# Patient Record
Sex: FEMALE | Race: WHITE | NOT HISPANIC OR LATINO | URBAN - METROPOLITAN AREA
[De-identification: names, ages, dates, MRNs, and addresses within clinical notes are randomized per-mention and may not be internally consistent; named-entity substitution may affect disease eponyms.]

---

## 2018-04-03 ENCOUNTER — EMERGENCY (EMERGENCY)
Facility: HOSPITAL | Age: 63
LOS: 0 days | Discharge: HOME | End: 2018-04-03
Attending: EMERGENCY MEDICINE | Admitting: EMERGENCY MEDICINE
Payer: MEDICAID

## 2018-04-03 ENCOUNTER — INPATIENT (INPATIENT)
Facility: HOSPITAL | Age: 63
LOS: 1 days | Discharge: HOME | End: 2018-04-05
Attending: HOSPITALIST | Admitting: HOSPITALIST
Payer: COMMERCIAL

## 2018-04-03 VITALS
DIASTOLIC BLOOD PRESSURE: 67 MMHG | TEMPERATURE: 98 F | RESPIRATION RATE: 18 BRPM | HEART RATE: 64 BPM | SYSTOLIC BLOOD PRESSURE: 124 MMHG | WEIGHT: 199.96 LBS | OXYGEN SATURATION: 100 % | HEIGHT: 71 IN

## 2018-04-03 VITALS — WEIGHT: 210.1 LBS

## 2018-04-03 VITALS
OXYGEN SATURATION: 99 % | RESPIRATION RATE: 17 BRPM | TEMPERATURE: 98 F | DIASTOLIC BLOOD PRESSURE: 66 MMHG | SYSTOLIC BLOOD PRESSURE: 118 MMHG | HEART RATE: 66 BPM

## 2018-04-03 DIAGNOSIS — R04.0 EPISTAXIS: ICD-10-CM

## 2018-04-03 DIAGNOSIS — Z91.013 ALLERGY TO SEAFOOD: ICD-10-CM

## 2018-04-03 DIAGNOSIS — Z88.5 ALLERGY STATUS TO NARCOTIC AGENT: ICD-10-CM

## 2018-04-03 LAB
ABO RH CONFIRMATION: SIGNIFICANT CHANGE UP
ADD ON TEST-SPECIMEN IN LAB: SIGNIFICANT CHANGE UP
ALBUMIN SERPL ELPH-MCNC: 3.6 G/DL — SIGNIFICANT CHANGE UP (ref 3.3–5)
ALP SERPL-CCNC: 89 U/L — SIGNIFICANT CHANGE UP (ref 40–120)
ALT FLD-CCNC: 28 U/L — SIGNIFICANT CHANGE UP (ref 12–78)
ANION GAP SERPL CALC-SCNC: 6 MMOL/L — SIGNIFICANT CHANGE UP (ref 5–17)
APTT BLD: 32.7 SEC — SIGNIFICANT CHANGE UP (ref 27.5–37.4)
AST SERPL-CCNC: 23 U/L — SIGNIFICANT CHANGE UP (ref 15–37)
BASOPHILS # BLD AUTO: 0.04 K/UL — SIGNIFICANT CHANGE UP (ref 0–0.2)
BASOPHILS NFR BLD AUTO: 0.6 % — SIGNIFICANT CHANGE UP (ref 0–2)
BILIRUB SERPL-MCNC: 0.2 MG/DL — SIGNIFICANT CHANGE UP (ref 0.2–1.2)
BLD GP AB SCN SERPL QL: SIGNIFICANT CHANGE UP
BUN SERPL-MCNC: 28 MG/DL — HIGH (ref 7–23)
CALCIUM SERPL-MCNC: 9.1 MG/DL — SIGNIFICANT CHANGE UP (ref 8.5–10.1)
CHLORIDE SERPL-SCNC: 108 MMOL/L — SIGNIFICANT CHANGE UP (ref 96–108)
CO2 SERPL-SCNC: 25 MMOL/L — SIGNIFICANT CHANGE UP (ref 22–31)
CREAT SERPL-MCNC: 0.88 MG/DL — SIGNIFICANT CHANGE UP (ref 0.5–1.3)
EOSINOPHIL # BLD AUTO: 0.2 K/UL — SIGNIFICANT CHANGE UP (ref 0–0.5)
EOSINOPHIL NFR BLD AUTO: 2.8 % — SIGNIFICANT CHANGE UP (ref 0–6)
GLUCOSE SERPL-MCNC: 109 MG/DL — HIGH (ref 70–99)
HCT VFR BLD CALC: 41.2 % — SIGNIFICANT CHANGE UP (ref 34.5–45)
HGB BLD-MCNC: 13.7 G/DL — SIGNIFICANT CHANGE UP (ref 11.5–15.5)
IMM GRANULOCYTES NFR BLD AUTO: 0.4 % — SIGNIFICANT CHANGE UP (ref 0–1.5)
INR BLD: 1.03 RATIO — SIGNIFICANT CHANGE UP (ref 0.88–1.16)
LYMPHOCYTES # BLD AUTO: 1.91 K/UL — SIGNIFICANT CHANGE UP (ref 1–3.3)
LYMPHOCYTES # BLD AUTO: 26.9 % — SIGNIFICANT CHANGE UP (ref 13–44)
MCHC RBC-ENTMCNC: 31.4 PG — SIGNIFICANT CHANGE UP (ref 27–34)
MCHC RBC-ENTMCNC: 33.3 GM/DL — SIGNIFICANT CHANGE UP (ref 32–36)
MCV RBC AUTO: 94.5 FL — SIGNIFICANT CHANGE UP (ref 80–100)
MONOCYTES # BLD AUTO: 0.56 K/UL — SIGNIFICANT CHANGE UP (ref 0–0.9)
MONOCYTES NFR BLD AUTO: 7.9 % — SIGNIFICANT CHANGE UP (ref 2–14)
NEUTROPHILS # BLD AUTO: 4.37 K/UL — SIGNIFICANT CHANGE UP (ref 1.8–7.4)
NEUTROPHILS NFR BLD AUTO: 61.4 % — SIGNIFICANT CHANGE UP (ref 43–77)
NRBC # BLD: 0 /100 WBCS — SIGNIFICANT CHANGE UP (ref 0–0)
PLATELET # BLD AUTO: 138 K/UL — LOW (ref 150–400)
POTASSIUM SERPL-MCNC: 4.5 MMOL/L — SIGNIFICANT CHANGE UP (ref 3.5–5.3)
POTASSIUM SERPL-SCNC: 4.5 MMOL/L — SIGNIFICANT CHANGE UP (ref 3.5–5.3)
PROT SERPL-MCNC: 7.3 GM/DL — SIGNIFICANT CHANGE UP (ref 6–8.3)
PROTHROM AB SERPL-ACNC: 11.1 SEC — SIGNIFICANT CHANGE UP (ref 9.8–12.7)
RBC # BLD: 4.36 M/UL — SIGNIFICANT CHANGE UP (ref 3.8–5.2)
RBC # FLD: 13.2 % — SIGNIFICANT CHANGE UP (ref 10.3–14.5)
SODIUM SERPL-SCNC: 139 MMOL/L — SIGNIFICANT CHANGE UP (ref 135–145)
TYPE + AB SCN PNL BLD: SIGNIFICANT CHANGE UP
WBC # BLD: 7.11 K/UL — SIGNIFICANT CHANGE UP (ref 3.8–10.5)
WBC # FLD AUTO: 7.11 K/UL — SIGNIFICANT CHANGE UP (ref 3.8–10.5)

## 2018-04-03 PROCEDURE — 30901 CONTROL OF NOSEBLEED: CPT | Mod: 77,78

## 2018-04-03 PROCEDURE — 30901 CONTROL OF NOSEBLEED: CPT | Mod: 50

## 2018-04-03 PROCEDURE — 30901 CONTROL OF NOSEBLEED: CPT

## 2018-04-03 PROCEDURE — 71045 X-RAY EXAM CHEST 1 VIEW: CPT | Mod: 26

## 2018-04-03 PROCEDURE — 93010 ELECTROCARDIOGRAM REPORT: CPT

## 2018-04-03 PROCEDURE — 99284 EMERGENCY DEPT VISIT MOD MDM: CPT | Mod: 25

## 2018-04-03 PROCEDURE — 99285 EMERGENCY DEPT VISIT HI MDM: CPT | Mod: 25

## 2018-04-03 RX ORDER — KETOROLAC TROMETHAMINE 30 MG/ML
15 SYRINGE (ML) INJECTION EVERY 8 HOURS
Qty: 0 | Refills: 0 | Status: DISCONTINUED | OUTPATIENT
Start: 2018-04-03 | End: 2018-04-04

## 2018-04-03 RX ORDER — ACETAMINOPHEN 500 MG
650 TABLET ORAL EVERY 6 HOURS
Qty: 0 | Refills: 0 | Status: DISCONTINUED | OUTPATIENT
Start: 2018-04-03 | End: 2018-04-05

## 2018-04-03 RX ORDER — AMPICILLIN SODIUM AND SULBACTAM SODIUM 250; 125 MG/ML; MG/ML
3 INJECTION, POWDER, FOR SUSPENSION INTRAMUSCULAR; INTRAVENOUS ONCE
Qty: 0 | Refills: 0 | Status: DISCONTINUED | OUTPATIENT
Start: 2018-04-03 | End: 2018-04-03

## 2018-04-03 RX ORDER — SENNA PLUS 8.6 MG/1
2 TABLET ORAL AT BEDTIME
Qty: 0 | Refills: 0 | Status: DISCONTINUED | OUTPATIENT
Start: 2018-04-03 | End: 2018-04-05

## 2018-04-03 RX ORDER — ONDANSETRON 8 MG/1
4 TABLET, FILM COATED ORAL EVERY 6 HOURS
Qty: 0 | Refills: 0 | Status: DISCONTINUED | OUTPATIENT
Start: 2018-04-03 | End: 2018-04-05

## 2018-04-03 RX ORDER — ACETAMINOPHEN 500 MG
1000 TABLET ORAL ONCE
Qty: 0 | Refills: 0 | Status: COMPLETED | OUTPATIENT
Start: 2018-04-03 | End: 2018-04-03

## 2018-04-03 RX ORDER — AMPICILLIN SODIUM AND SULBACTAM SODIUM 250; 125 MG/ML; MG/ML
3 INJECTION, POWDER, FOR SUSPENSION INTRAMUSCULAR; INTRAVENOUS ONCE
Qty: 0 | Refills: 0 | Status: COMPLETED | OUTPATIENT
Start: 2018-04-03 | End: 2018-04-03

## 2018-04-03 RX ADMIN — Medication 1000 MILLIGRAM(S): at 14:30

## 2018-04-03 RX ADMIN — Medication 15 MILLIGRAM(S): at 18:43

## 2018-04-03 RX ADMIN — AMPICILLIN SODIUM AND SULBACTAM SODIUM 200 GRAM(S): 250; 125 INJECTION, POWDER, FOR SUSPENSION INTRAMUSCULAR; INTRAVENOUS at 11:20

## 2018-04-03 RX ADMIN — Medication 15 MILLIGRAM(S): at 17:37

## 2018-04-03 RX ADMIN — Medication 1 TABLET(S): at 21:27

## 2018-04-03 RX ADMIN — Medication 650 MILLIGRAM(S): at 21:27

## 2018-04-03 NOTE — ED PROVIDER NOTE - MEDICAL DECISION MAKING DETAILS
pt presenting with persistent epistaxis. no trauma, AC, med history. will check cbc, coags. will attempt pressure and afrin but will likely need rhino rocket

## 2018-04-03 NOTE — ED ADULT TRIAGE NOTE - CHIEF COMPLAINT QUOTE
pt c/o nose bleed that started less than 30 minutes ago, pt was seen in ED for same complaint and discharged 30 minutes ago, pt denies fall or trauma. pt denies dizziness weakness SOB

## 2018-04-03 NOTE — ED STATDOCS - OBJECTIVE STATEMENT
64 y/o female with no pertinent PMHx presents to the ED for evaluation of B/L epistaxis. Pt was seen at  this morning for same complaint and was discharged after B/L interior nasal packing. Pt now describes feeling the blood coming down the back of her throat. No blood thinners. Denies similar nosebleeds in the past. Denies SOB.

## 2018-04-03 NOTE — ED STATDOCS - ENMT, MLM
+B/L rhino rockets in place, soaked in blood, no active bleeding or oozing of blood around the packing. Nasal mucosa clear. Mouth with normal mucosa  Throat has no vesicles, no oropharyngeal exudates and uvula is midline.

## 2018-04-03 NOTE — H&P ADULT - ASSESSMENT
63 year old woman with recurrent epistaxis despite nasal packing.      Epistaxis:    -s/p nasal packing with rhinorocket 7.5  -monitor on tele  -chest xray normal  -CBC, INR, chem unremarkable   -Augmentin for prophylaxis   -f/u ENT    DVT ppx:  -ambulation

## 2018-04-03 NOTE — ED ADULT NURSE NOTE - OBJECTIVE STATEMENT
pt c/o nose bleed that started last evening. pt applied pressure and stopped, however started again early this morning and has been ongoing for several hours. pt not on any medications, no blood thinners. denies trauma. no hx of prior nosebleeds

## 2018-04-03 NOTE — H&P ADULT - NSHPPHYSICALEXAM_GEN_ALL_CORE
Vital Signs Last 24 Hrs  T(C): 36.1 (03 Apr 2018 15:17), Max: 36.9 (03 Apr 2018 09:50)  T(F): 97 (03 Apr 2018 15:17), Max: 98.4 (03 Apr 2018 09:50)  HR: 75 (03 Apr 2018 15:17) (64 - 75)  BP: 138/70 (03 Apr 2018 15:17) (118/66 - 138/70)  BP(mean): --  RR: 18 (03 Apr 2018 15:17) (17 - 18)  SpO2: 100% (03 Apr 2018 15:17) (99% - 100%)    PHYSICAL EXAM:    Constitutional: NAD, awake and alert, well-developed  HEENT: PERR, EOMI, Normal Hearing, MMM, rhino rocket in right nare, no bleeding.   Neck: Soft and supple  Respiratory: Breath sounds are clear bilaterally, No wheezing, rales or rhonchi  Cardiovascular: S1 and S2, regular rate and rhythm, no Murmurs, gallops or rubs  Gastrointestinal: Bowel Sounds present, soft, nontender, nondistended, no guarding, no rebound  Extremities: No peripheral edema  Neurological: A/O x 3, no focal deficits in my limited exam  Skin: No rashes

## 2018-04-03 NOTE — ED PROVIDER NOTE - CARE PLAN
PHYSICAL EXAM:  GENERAL: NAD, well-groomed, well-developed  HEAD:  Atraumatic, Normocephalic  EYES: EOMI, PERRLA, conjunctiva and sclera clear  ENMT: Moist mucous membranes  NECK: Supple, No JVD  CHEST/LUNG: Clear to percussion bilaterally; No rales, rhonchi, wheezing, or rubs  HEART: Regular rate and rhythm; No murmurs, rubs, or gallops  ABDOMEN: Soft, Nontender, Nondistended; Bowel sounds present  EXTREMITIES:  2+ Peripheral Pulses, No clubbing, cyanosis, or edema.   LYMPH: No lymphadenopathy noted  SKIN: Erythema along the right thigh and right ankle, patchy in distribution   NERVOUS SYSTEM:  Alert & Oriented X3, Good concentration; Motor Strength 5/5 B/L upper and lower extremities
Principal Discharge DX:	Epistaxis

## 2018-04-03 NOTE — ED STATDOCS - MEDICAL DECISION MAKING DETAILS
Discuss with ENT, likely remove packing and place posterior packing. Discuss with ENT, likely remove packing and place posterior packing.  Pt with a likely posterior bleed as she improved with placement of posterior R packing and able to remove L packing w/o further issues. Will need admission and monitoring for bradydysrhythmias while posterior packing in place

## 2018-04-03 NOTE — ED PROVIDER NOTE - ENMT, MLM
Airway patent, right sided steady epistaxis. unable to visualize source of bleeding. Mouth with normal mucosa. Throat has no vesicles, no oropharyngeal exudates and uvula is midline.

## 2018-04-03 NOTE — ED STATDOCS - ATTENDING CONTRIBUTION TO CARE
I, Miles White MD,  performed the initial face to face bedside interview with this patient regarding history of present illness, review of symptoms and relevant past medical, social and family history.  I completed an independent physical examination.  I was the initial provider who evaluated this patient. I have signed out the follow up of any pending tests (i.e. labs, radiological studies) to the ACP.  I have communicated the patient’s plan of care and disposition with the ACP.  The history, relevant review of systems, past medical and surgical history, medical decision making, and physical examination was documented by the scribe in my presence and I attest to the accuracy of the documentation.  I, Miles White MD, personally saw the patient with ACP.  I have personally performed a face to face diagnostic evaluation on this patient.  I have reviewed the ACP note and agree with the history, exam, and plan of care, except as noted.

## 2018-04-03 NOTE — H&P ADULT - NSHPLABSRESULTS_GEN_ALL_CORE
13.7   7.11  )-----------( 138      ( 03 Apr 2018 06:34 )             41.2     04-03    139  |  108  |  28<H>  ----------------------------<  109<H>  4.5   |  25  |  0.88    Ca    9.1      03 Apr 2018 06:34    TPro  7.3  /  Alb  3.6  /  TBili  0.2  /  DBili  x   /  AST  23  /  ALT  28  /  AlkPhos  89  04-03    CAPILLARY BLOOD GLUCOSE        LIVER FUNCTIONS - ( 03 Apr 2018 06:34 )  Alb: 3.6 g/dL / Pro: 7.3 gm/dL / ALK PHOS: 89 U/L / ALT: 28 U/L / AST: 23 U/L / GGT: x           PT/INR - ( 03 Apr 2018 06:34 )   PT: 11.1 sec;   INR: 1.03 ratio         PTT - ( 03 Apr 2018 06:34 )  PTT:32.7 sec      < from: Xray Chest 1 View- PORTABLE-Urgent (04.03.18 @ 11:26) >    Impression: No active pulmonary disease.    < end of copied text >

## 2018-04-03 NOTE — ED ADULT NURSE NOTE - OBJECTIVE STATEMENT
Patient presents with bilateral nosebleed. Patient was seen in ER earlier this morning and received nasal packing and was discharged. Patient reports she feels bleeding down back of throat. Denies blood thinners. Denies history of nosebleeds in past

## 2018-04-03 NOTE — H&P ADULT - HISTORY OF PRESENT ILLNESS
63 year old woman with no PMHx presents with epistaxis which started Monday morning at 6:15AM.  Pt states this woke her up.  They applied compression with resolution however that night the bleeding restarted.  Pt came to ER and a rhino rocket was applied b/l.  She was discharged however she can still feel blood going down her throat so she came back.   Denies taking any meds.     In ER: Rhinorocket exchanged for Rapid Rhino #7.5 on right.  She has mild discomfort 4 out of 10.  She was given tylenol.  She will be admitted for 2 day monitoring.

## 2018-04-03 NOTE — ED STATDOCS - PROGRESS NOTE DETAILS
63 yr. old female PMH: Denied presents to ED with 63 yr. old female PMH: Denied presents to ED with bilateral epistaxsis onset in right nostril yesterday morning seen in ED this am and bilateral anterior nasal Rapid Rhino packing inserted. At home began to bleed in back of throat after being discharged this am. No fever or chills. Not on any blood thinners. No PMH of nose bleeds. Seen and examined by attending in Intake. Plan: Dr. Fields for consult and remove left nasal packing and replace with 7.5 posterior nasal packing. Will F/U with patient and results. Marleen NP Anterior nasal packing removed by Dr. White blew a large clot from nostril inserted 7.5 posterior nasal Rapid Rhino packing. Tolerated well by patient. Marleen NP

## 2018-04-03 NOTE — ED PROVIDER NOTE - PROGRESS NOTE DETAILS
AJM: pt improved after b/l nasal packing. no posterior bleeding noted. spoke with dr Fields of ent who will see pt in clinic tomorrow. labs normal. All results discussed with the patient, and a copy of results has been provided. Patient is comfortable with dc plan for home. Opportunity for questions was provided and all questions have been addressed.

## 2018-04-03 NOTE — ED PROVIDER NOTE - OBJECTIVE STATEMENT
Patient is a 63 year old female with no PMH presents with intermittent epistaxis. Symptoms began yesterday. no trauma. Bleeding has now been present for several hours. No blood thinners, vomiting. Bleeding is mainly from right nostril. Pt has tried compression with ice without improvement.

## 2018-04-04 LAB
ANION GAP SERPL CALC-SCNC: 7 MMOL/L — SIGNIFICANT CHANGE UP (ref 5–17)
BUN SERPL-MCNC: 42 MG/DL — HIGH (ref 7–23)
CALCIUM SERPL-MCNC: 8.9 MG/DL — SIGNIFICANT CHANGE UP (ref 8.5–10.1)
CHLORIDE SERPL-SCNC: 108 MMOL/L — SIGNIFICANT CHANGE UP (ref 96–108)
CO2 SERPL-SCNC: 25 MMOL/L — SIGNIFICANT CHANGE UP (ref 22–31)
CREAT SERPL-MCNC: 0.98 MG/DL — SIGNIFICANT CHANGE UP (ref 0.5–1.3)
GLUCOSE SERPL-MCNC: 156 MG/DL — HIGH (ref 70–99)
HCT VFR BLD CALC: 33 % — LOW (ref 34.5–45)
HCT VFR BLD CALC: 36.6 % — SIGNIFICANT CHANGE UP (ref 34.5–45)
HGB BLD-MCNC: 10.9 G/DL — LOW (ref 11.5–15.5)
HGB BLD-MCNC: 12.5 G/DL — SIGNIFICANT CHANGE UP (ref 11.5–15.5)
MCHC RBC-ENTMCNC: 31.5 PG — SIGNIFICANT CHANGE UP (ref 27–34)
MCHC RBC-ENTMCNC: 31.6 PG — SIGNIFICANT CHANGE UP (ref 27–34)
MCHC RBC-ENTMCNC: 33 GM/DL — SIGNIFICANT CHANGE UP (ref 32–36)
MCHC RBC-ENTMCNC: 34.2 GM/DL — SIGNIFICANT CHANGE UP (ref 32–36)
MCV RBC AUTO: 92.4 FL — SIGNIFICANT CHANGE UP (ref 80–100)
MCV RBC AUTO: 95.4 FL — SIGNIFICANT CHANGE UP (ref 80–100)
NRBC # BLD: 0 /100 WBCS — SIGNIFICANT CHANGE UP (ref 0–0)
NRBC # BLD: 0 /100 WBCS — SIGNIFICANT CHANGE UP (ref 0–0)
PLATELET # BLD AUTO: 131 K/UL — LOW (ref 150–400)
PLATELET # BLD AUTO: 146 K/UL — LOW (ref 150–400)
POTASSIUM SERPL-MCNC: 4.2 MMOL/L — SIGNIFICANT CHANGE UP (ref 3.5–5.3)
POTASSIUM SERPL-SCNC: 4.2 MMOL/L — SIGNIFICANT CHANGE UP (ref 3.5–5.3)
RBC # BLD: 3.46 M/UL — LOW (ref 3.8–5.2)
RBC # BLD: 3.96 M/UL — SIGNIFICANT CHANGE UP (ref 3.8–5.2)
RBC # FLD: 13.2 % — SIGNIFICANT CHANGE UP (ref 10.3–14.5)
RBC # FLD: 13.5 % — SIGNIFICANT CHANGE UP (ref 10.3–14.5)
SODIUM SERPL-SCNC: 140 MMOL/L — SIGNIFICANT CHANGE UP (ref 135–145)
WBC # BLD: 10.5 K/UL — SIGNIFICANT CHANGE UP (ref 3.8–10.5)
WBC # BLD: 8.92 K/UL — SIGNIFICANT CHANGE UP (ref 3.8–10.5)
WBC # FLD AUTO: 10.5 K/UL — SIGNIFICANT CHANGE UP (ref 3.8–10.5)
WBC # FLD AUTO: 8.92 K/UL — SIGNIFICANT CHANGE UP (ref 3.8–10.5)

## 2018-04-04 RX ORDER — LANOLIN ALCOHOL/MO/W.PET/CERES
3 CREAM (GRAM) TOPICAL AT BEDTIME
Qty: 0 | Refills: 0 | Status: DISCONTINUED | OUTPATIENT
Start: 2018-04-04 | End: 2018-04-05

## 2018-04-04 RX ORDER — ACETAMINOPHEN 500 MG
1000 TABLET ORAL ONCE
Qty: 0 | Refills: 0 | Status: COMPLETED | OUTPATIENT
Start: 2018-04-04 | End: 2018-04-04

## 2018-04-04 RX ORDER — KETOROLAC TROMETHAMINE 30 MG/ML
10 SYRINGE (ML) INJECTION ONCE
Qty: 0 | Refills: 0 | Status: DISCONTINUED | OUTPATIENT
Start: 2018-04-04 | End: 2018-04-04

## 2018-04-04 RX ORDER — KETOROLAC TROMETHAMINE 30 MG/ML
15 SYRINGE (ML) INJECTION ONCE
Qty: 0 | Refills: 0 | Status: DISCONTINUED | OUTPATIENT
Start: 2018-04-04 | End: 2018-04-05

## 2018-04-04 RX ORDER — OXYMETAZOLINE HYDROCHLORIDE 0.5 MG/ML
2 SPRAY NASAL ONCE
Qty: 0 | Refills: 0 | Status: DISCONTINUED | OUTPATIENT
Start: 2018-04-04 | End: 2018-04-05

## 2018-04-04 RX ORDER — DIPHENHYDRAMINE HCL 50 MG
25 CAPSULE ORAL ONCE
Qty: 0 | Refills: 0 | Status: DISCONTINUED | OUTPATIENT
Start: 2018-04-04 | End: 2018-04-05

## 2018-04-04 RX ADMIN — Medication 1 TABLET(S): at 05:10

## 2018-04-04 RX ADMIN — Medication 400 MILLIGRAM(S): at 11:37

## 2018-04-04 RX ADMIN — Medication 650 MILLIGRAM(S): at 20:30

## 2018-04-04 RX ADMIN — Medication 1 TABLET(S): at 17:29

## 2018-04-04 RX ADMIN — Medication 3 MILLIGRAM(S): at 20:50

## 2018-04-04 RX ADMIN — Medication 650 MILLIGRAM(S): at 06:08

## 2018-04-04 RX ADMIN — Medication 15 MILLIGRAM(S): at 01:48

## 2018-04-04 RX ADMIN — Medication 15 MILLIGRAM(S): at 02:08

## 2018-04-04 RX ADMIN — Medication 650 MILLIGRAM(S): at 05:10

## 2018-04-04 RX ADMIN — Medication 650 MILLIGRAM(S): at 01:47

## 2018-04-04 RX ADMIN — Medication 1000 MILLIGRAM(S): at 12:10

## 2018-04-04 NOTE — PROGRESS NOTE ADULT - ASSESSMENT
Epistaxis currently controlled with functional 7.5 cm right sided rapid rhino pack.  1) If no further episodes overnight pt may be discharged to the office for packing removal on Friday in the office. Card given to pt.  2) If bleeding resumes from the right nostril, insert surgicel around current packing (the pts nurse has it available)

## 2018-04-04 NOTE — CONSULT NOTE ADULT - SUBJECTIVE AND OBJECTIVE BOX
63 year old woman with no PMHx presents with epistaxis which started Monday morning at 6:15AM.  Pt states this woke her up.  They applied compression with resolution however that night the bleeding restarted.  Pt came to ER and a rhino rocket was applied b/l.  She was discharged and returned with a bleeding down her throat. In the ER the Rhino rocket was exchanged for a7.5cm Rapid Rhino on the right.  Bleeding was controlled but the balloon deflated last night and was reinserted by the ICU staff. No bleeding now     Review of Systems:  Review of Systems: REVIEW OF SYSTEMS: All other review of systems is negative unless indicated above.	      Allergies and Intolerances:        Allergies:  	mineral oil: Drug, Rash  	codeine: Drug, Unknown, Originally Entered as [Unknown] reaction to [Codeine]  	shellfish: Food, Unknown, Originally Entered as [Unknown] reaction to [shellfish derived]    Home Medications:   * Patient Currently Takes Medications as of 03-Apr-2018 08:43 documented in Structured Notes  · 	Augmentin 875 mg-125 mg oral tablet: 1 tab(s) orally every 12 hours     .    Patient History:    Past Medical History:  No pertinent past medical history.     Past Surgical History:  No significant past surgical history.     Family History:  No pertinent family history in first degree relatives.     Social History:  Social History (marital status, living situation, occupation, tobacco use, alcohol and drug use, and sexual history): Lives with .  No kids. Denies alcohol or tobacco use.	    Exam:  Rapid rhino in right nasal cavity with no active bleed.  Oral cavity dry  no neck mass

## 2018-04-04 NOTE — PROGRESS NOTE ADULT - ASSESSMENT
63 year old woman with recurrent epistaxis despite nasal packing.    EPITAXIS  -s/p nasal packing with rhinorocket 7.5  w/recurrent bleed and device deflation  ** bleed decrease with re-inflation with 2cc by RN  - recall ENT to re-assess given frequency of deflation  - dc toradol d/t ongoing bleeding  - order IV tylenol for pain (aware inability to treat inflammation)  -chest xray normal  -CBC, INR, chem unremarkable   - con't Augmentin for prophylaxis   -f/u ENT    DVT ppx:  - low risk for DVT  - ambulation 63 year old woman with recurrent epistaxis despite nasal packing.    EPITAXIS with acute blood loss anemia:   -s/p nasal packing with rhinorocket 7.5  w/recurrent bleed and device deflation  ** bleed decrease with re-inflation with 2cc by RN  - recall ENT to re-assess given frequency of deflation  - dc toradol d/t ongoing bleeding  - order IV tylenol for pain (aware inability to treat inflammation)  -chest xray normal  -INR, chem unremarkable   - con't Augmentin for prophylaxis   -f/u ENT  -f/u repeat cbc    Thrombocytopenia:  -monitor    DVT ppx:  - low risk for DVT  - ambulation    Attending Statement:  40 minutes spent on total encounter; more than 50% of the visit was spent counseling and/or coordinating care by the attending physician.  Patient seen and examined with NP Bushra.  Agree with physical exam and assessment and plan.

## 2018-04-04 NOTE — CHART NOTE - NSCHARTNOTEFT_GEN_A_CORE
Called by nurse @ ~2:40 AM for epistaxis. Pt admitted for epistaxis, had initially b/l nasal balloons, now L one removed and R one still in place with packing. Pt is currently actively bleeding from R     As per Dr. Fields, requested we remove balloon from R side and replace it with new rhino rocket. While he was giving his recommendations, nurses called rapid response for risk of airway compromise. Stem had detached from balloon with balloon deflated and stuck in patient's nose. Pt tried blowing out her nose to get balloon out but only blood came out. Dr. Dial Called by nurse @ ~2:40 AM for epistaxis. Pt admitted for epistaxis, had initially b/l nasal balloons, now L one removed and R one still in place with packing. Pt is currently actively bleeding from R     As per Dr. Fields, requested we remove balloon from R side and replace it with new rhino rocket. While he was giving his recommendations, nurses called rapid response for risk of airway compromise. Stem had detached from balloon with balloon deflated and stuck in patient's nose. Pt tried blowing out her nose to get balloon out but only blood came out. Dr. Dial was able to extract old deflated balloon at bedside and replace with new rhino rocket. The patient was vitally stable the whole time and saturating % on room air.    f/u stat CBC  Will continue to monitor    Discussed w/ intensivist and senior resident

## 2018-04-04 NOTE — CONSULT NOTE ADULT - ASSESSMENT
Epistaxis controlled with right sided packing  1) Leave packing in place 3 days  2) ABX while packing in place to prevent sinusitis  3) Outpt follow up for packing removal

## 2018-04-05 ENCOUNTER — TRANSCRIPTION ENCOUNTER (OUTPATIENT)
Age: 63
End: 2018-04-05

## 2018-04-05 VITALS
TEMPERATURE: 99 F | OXYGEN SATURATION: 100 % | DIASTOLIC BLOOD PRESSURE: 60 MMHG | SYSTOLIC BLOOD PRESSURE: 140 MMHG | HEART RATE: 72 BPM

## 2018-04-05 LAB
ANION GAP SERPL CALC-SCNC: 8 MMOL/L — SIGNIFICANT CHANGE UP (ref 5–17)
BUN SERPL-MCNC: 34 MG/DL — HIGH (ref 7–23)
CALCIUM SERPL-MCNC: 8.8 MG/DL — SIGNIFICANT CHANGE UP (ref 8.5–10.1)
CHLORIDE SERPL-SCNC: 106 MMOL/L — SIGNIFICANT CHANGE UP (ref 96–108)
CO2 SERPL-SCNC: 25 MMOL/L — SIGNIFICANT CHANGE UP (ref 22–31)
CREAT SERPL-MCNC: 0.99 MG/DL — SIGNIFICANT CHANGE UP (ref 0.5–1.3)
GLUCOSE SERPL-MCNC: 128 MG/DL — HIGH (ref 70–99)
HCT VFR BLD CALC: 30.7 % — LOW (ref 34.5–45)
HGB BLD-MCNC: 10.2 G/DL — LOW (ref 11.5–15.5)
MCHC RBC-ENTMCNC: 31.4 PG — SIGNIFICANT CHANGE UP (ref 27–34)
MCHC RBC-ENTMCNC: 33.2 GM/DL — SIGNIFICANT CHANGE UP (ref 32–36)
MCV RBC AUTO: 94.5 FL — SIGNIFICANT CHANGE UP (ref 80–100)
NRBC # BLD: 0 /100 WBCS — SIGNIFICANT CHANGE UP (ref 0–0)
PLATELET # BLD AUTO: 120 K/UL — LOW (ref 150–400)
POTASSIUM SERPL-MCNC: 4.2 MMOL/L — SIGNIFICANT CHANGE UP (ref 3.5–5.3)
POTASSIUM SERPL-SCNC: 4.2 MMOL/L — SIGNIFICANT CHANGE UP (ref 3.5–5.3)
RBC # BLD: 3.25 M/UL — LOW (ref 3.8–5.2)
RBC # FLD: 13.7 % — SIGNIFICANT CHANGE UP (ref 10.3–14.5)
SODIUM SERPL-SCNC: 139 MMOL/L — SIGNIFICANT CHANGE UP (ref 135–145)
WBC # BLD: 9.89 K/UL — SIGNIFICANT CHANGE UP (ref 3.8–10.5)
WBC # FLD AUTO: 9.89 K/UL — SIGNIFICANT CHANGE UP (ref 3.8–10.5)

## 2018-04-05 RX ORDER — ACETAMINOPHEN 500 MG
2 TABLET ORAL
Qty: 0 | Refills: 0 | DISCHARGE
Start: 2018-04-05

## 2018-04-05 RX ORDER — KETOROLAC TROMETHAMINE 30 MG/ML
15 SYRINGE (ML) INJECTION ONCE
Qty: 0 | Refills: 0 | Status: DISCONTINUED | OUTPATIENT
Start: 2018-04-05 | End: 2018-04-05

## 2018-04-05 RX ORDER — OXYCODONE HYDROCHLORIDE 5 MG/1
5 TABLET ORAL EVERY 6 HOURS
Qty: 0 | Refills: 0 | Status: DISCONTINUED | OUTPATIENT
Start: 2018-04-05 | End: 2018-04-05

## 2018-04-05 RX ORDER — ACETAMINOPHEN 500 MG
1000 TABLET ORAL ONCE
Qty: 0 | Refills: 0 | Status: COMPLETED | OUTPATIENT
Start: 2018-04-05 | End: 2018-04-05

## 2018-04-05 RX ADMIN — OXYCODONE HYDROCHLORIDE 5 MILLIGRAM(S): 5 TABLET ORAL at 11:39

## 2018-04-05 RX ADMIN — Medication 15 MILLIGRAM(S): at 05:21

## 2018-04-05 RX ADMIN — Medication 650 MILLIGRAM(S): at 04:19

## 2018-04-05 RX ADMIN — Medication 1 TABLET(S): at 05:14

## 2018-04-05 RX ADMIN — Medication 15 MILLIGRAM(S): at 05:22

## 2018-04-05 RX ADMIN — Medication 1 TABLET(S): at 17:18

## 2018-04-05 RX ADMIN — Medication 400 MILLIGRAM(S): at 14:21

## 2018-04-05 RX ADMIN — Medication 1000 MILLIGRAM(S): at 15:20

## 2018-04-05 RX ADMIN — OXYCODONE HYDROCHLORIDE 5 MILLIGRAM(S): 5 TABLET ORAL at 12:33

## 2018-04-05 NOTE — DISCHARGE NOTE ADULT - HOSPITAL COURSE
· Subjective and Objective: 	  Chart and meds reviewed.  Patient seen and examined.    CC:    HPI: 63 year old woman with no PMHx presents with epistaxis which started Monday morning at 6:15AM.  Pt states this woke her up.  They applied compression with resolution however that night the bleeding restarted.  Pt came to ER and a rhino rocket was applied b/l.  She was discharged however she can still feel blood going down her throat so she came back.   Denies taking any meds.     In ER: Rhinorocket exchanged for Rapid Rhino #7.5 on right.  She has mild discomfort 4 out of 10.  She was given tylenol.  She will be admitted for 2 day monitoring.   HOSPITAL COURSE  4/4/18 - Annoyed that bleeding returns and device is deflated "again", has general headache; no dizziness/CP/palpitations/SOB; n/v/f/c  4/5: Pt anxious and uncomfortable but otherwise has packing in place.  Pressure had to be adjusted several times.  She is to be discharged home with outpatient ENT follow up tomorrow.        Assessment and Plan:  63 year old woman with recurrent epistaxis.    EPITAXIS with acute blood loss anemia:   -s/p nasal packing with rhinorocket 7.5  - monitor for bleeding.   -recall ENT to re-assess given frequency of deflation  -dc toradol d/t ongoing bleeding  -chest xray normal  -INR, chem unremarkable    con't Augmentin for prophylaxis   -f/u ENT tomorrow as outpatient.

## 2018-04-05 NOTE — DISCHARGE NOTE ADULT - CARE PLAN
Principal Discharge DX:	Epistaxis, recurrent  Goal:	resolution of symptoms.  Assessment and plan of treatment:	Follow up with ENT tomorrow.  keep packing in place.

## 2018-04-05 NOTE — DISCHARGE NOTE ADULT - PATIENT PORTAL LINK FT
You can access the [facility], offered by [facility], by registering with the following website: [email or web address]

## 2018-04-05 NOTE — DISCHARGE NOTE ADULT - CARE PROVIDER_API CALL
Lenka Fields (DO), Otolaryngology  [address] [address]  Phone: [phone]  Fax: [phone]

## 2018-04-05 NOTE — PROGRESS NOTE ADULT - SUBJECTIVE AND OBJECTIVE BOX
Chart and meds reviewed.  Patient seen and examined.    CC:    HPI: 63 year old woman with no PMHx presents with epistaxis which started Monday morning at 6:15AM.  Pt states this woke her up.  They applied compression with resolution however that night the bleeding restarted.  Pt came to ER and a rhino rocket was applied b/l.  She was discharged however she can still feel blood going down her throat so she came back.   Denies taking any meds.     In ER: Rhinorocket exchanged for Rapid Rhino #7.5 on right.  She has mild discomfort 4 out of 10.  She was given tylenol.  She will be admitted for 2 day monitoring.      All systems reviewed and found to be negative with the exception of what has been described above.    HOSPITAL COURSE  4/4/18 - Annoyed that bleeding returns and device is deflated "again", has general headache; no dizziness/CP/palpitations/SOB; n/v/f/c      MEDICATIONS  (STANDING):  amoxicillin  875 milliGRAM(s)/clavulanate 1 Tablet(s) Oral two times a day    MEDICATIONS  (PRN):  acetaminophen   Tablet. 650 milliGRAM(s) Oral every 6 hours PRN Moderate Pain (4 - 6)  ondansetron Injectable 4 milliGRAM(s) IV Push every 6 hours PRN Nausea  oxymetazoline 0.05% Nasal Spray 2 Spray(s) Left Nostril once PRN Congestion  senna 2 Tablet(s) Oral at bedtime PRN Constipation      VITALS:  Vital Signs Last 24 Hrs  T(C): 36.6 (04 Apr 2018 10:15), Max: 36.9 (03 Apr 2018 18:21)  T(F): 97.8 (04 Apr 2018 10:15), Max: 98.5 (03 Apr 2018 18:21)  HR: 84 (04 Apr 2018 10:15) (72 - 84)  BP: 109/49 (04 Apr 2018 10:15) (109/49 - 147/70)  BP(mean): --  RR: 18 (04 Apr 2018 10:15) (18 - 20)  SpO2: 95% (04 Apr 2018 10:15) (95% - 100%)        PHYSICAL EXAM:    HEENT: AT/NC; pupils equal round nonicteric, active nasal bleeding, rhino tube ~deflated  NECK:   supple, no carotid bruits, no palpable lymph nodes, no thyromegaly  CV:  +S1, +S2, regular, no murmurs or rubs  RESP:   lungs clear to auscultation bilaterally, no wheezing, rales, rhonchi, good air entry bilaterally  GI:  abdomen soft, non-tender, non-distended, normal BS, no bruits, no abdominal masses, no palpable masses  MSK:   normal muscle tone, no atrophy, no rigidity, no contractions  EXT:   no clubbing, no cyanosis, no edema, no calf pain, swelling or erythema  VASCULAR:  pulses equal and symmetric in the upper and lower extremities  NEURO:  AAOX3, no focal neurological deficits, follows all commands, able to move extremities spontaneously  SKIN:  no ulcers, lesions or rashes      LABS                        10.9   8.92  )-----------( 131      ( 04 Apr 2018 09:37 )             33.0     04-04    140  |  108  |  42<H>  ----------------------------<  156<H>  4.2   |  25  |  0.98    Ca    8.9      04 Apr 2018 09:37    TPro  7.3  /  Alb  3.6  /  TBili  0.2  /  DBili  x   /  AST  23  /  ALT  28  /  AlkPhos  89  04-03      LIVER FUNCTIONS - ( 03 Apr 2018 06:34 )  Alb: 3.6 g/dL / Pro: 7.3 gm/dL / ALK PHOS: 89 U/L / ALT: 28 U/L / AST: 23 U/L / GGT: x           PT/INR - ( 03 Apr 2018 06:34 )   PT: 11.1 sec;   INR: 1.03 ratio       PTT - ( 03 Apr 2018 06:34 )  PTT:32.7 sec    RADIOLOGY    	< from: Xray Chest 1 View- PORTABLE-Urgent (04.03.18 @ 11:26) >    	Impression: No active pulmonary disease.    	< end of copied text >
Chart and meds reviewed.  Patient seen and examined.    CC:    HPI: 63 year old woman with no PMHx presents with epistaxis which started Monday morning at 6:15AM.  Pt states this woke her up.  They applied compression with resolution however that night the bleeding restarted.  Pt came to ER and a rhino rocket was applied b/l.  She was discharged however she can still feel blood going down her throat so she came back.   Denies taking any meds.     In ER: Rhinorocket exchanged for Rapid Rhino #7.5 on right.  She has mild discomfort 4 out of 10.  She was given tylenol.  She will be admitted for 2 day monitoring.      All systems reviewed and found to be negative with the exception of what has been described above.    HOSPITAL COURSE  4/4/18 - Annoyed that bleeding returns and device is deflated "again", has general headache; no dizziness/CP/palpitations/SOB; n/v/f/c  4/5: Pt anxious and uncomfortable but otherwise has packing in place.  Pressure had to be adjusted several times by nursing staff.  Pt remains HD stable.    MEDICATIONS      MEDICATIONS  (STANDING):  amoxicillin  875 milliGRAM(s)/clavulanate 1 Tablet(s) Oral two times a day    MEDICATIONS  (PRN):  acetaminophen   Tablet. 650 milliGRAM(s) Oral every 6 hours PRN Moderate Pain (4 - 6)  diphenhydrAMINE   Capsule 25 milliGRAM(s) Oral once PRN Insomnia  melatonin 3 milliGRAM(s) Oral at bedtime PRN Insomnia  ondansetron Injectable 4 milliGRAM(s) IV Push every 6 hours PRN Nausea  oxyCODONE    IR 5 milliGRAM(s) Oral every 6 hours PRN Moderate Pain (4 - 6)  oxymetazoline 0.05% Nasal Spray 2 Spray(s) Left Nostril once PRN Congestion  senna 2 Tablet(s) Oral at bedtime PRN Constipation    Vital Signs Last 24 Hrs  T(C): 37.3 (05 Apr 2018 10:55), Max: 37.3 (05 Apr 2018 10:55)  T(F): 99.1 (05 Apr 2018 10:55), Max: 99.1 (05 Apr 2018 10:55)  HR: 81 (05 Apr 2018 10:55) (73 - 82)  BP: 130/56 (05 Apr 2018 10:55) (124/62 - 130/56)  BP(mean): --  RR: 18 (05 Apr 2018 10:55) (17 - 18)  SpO2: 99% (05 Apr 2018 10:55) (99% - 100%)    PHYSICAL EXAM:    HEENT: AT/NC; pupils equal round nonicteric, active nasal bleeding, rhino tube ~deflated  NECK:   supple, no carotid bruits, no palpable lymph nodes, no thyromegaly  CV:  +S1, +S2, regular, no murmurs or rubs  RESP:   lungs clear to auscultation bilaterally, no wheezing, rales, rhonchi, good air entry bilaterally  GI:  abdomen soft, non-tender, non-distended, normal BS, no bruits, no abdominal masses, no palpable masses  MSK:   normal muscle tone, no atrophy, no rigidity, no contractions  EXT:   no clubbing, no cyanosis, no edema, no calf pain, swelling or erythema  VASCULAR:  pulses equal and symmetric in the upper and lower extremities  NEURO:  AAOX3, no focal neurological deficits, follows all commands, able to move extremities spontaneously  SKIN:  no ulcers, lesions or rashes      LABS                                              10.2   9.89  )-----------( 120      ( 05 Apr 2018 06:16 )             30.7     04-05    139  |  106  |  34<H>  ----------------------------<  128<H>  4.2   |  25  |  0.99    Ca    8.8      05 Apr 2018 06:16      CAPILLARY BLOOD GLUCOSE      Assessment and Plan:  63 year old woman with recurrent epistaxis.    EPITAXIS with acute blood loss anemia:   -s/p nasal packing with rhinorocket 7.5  - monitor for bleeding.   -recall ENT to re-assess given frequency of deflation  -dc toradol d/t ongoing bleeding  -pain management with oxycodone.   -chest xray normal  -INR, chem unremarkable    con't Augmentin for prophylaxis   -f/u ENT    Thrombocytopenia:  -monitor    DVT ppx:  - low risk for DVT  - ambulation    Dispo:  -discharge home tomorrow if no further bleeding.  Follow up with ENT in office Friday for removal.     Attending Statement:  40 minutes spent on total encounter; more than 50% of the visit was spent counseling and/or coordinating care by the attending physician.  Patient seen and examined with NP Bushra.  Agree with physical exam and assessment and plan.
Pt seen again secondary to continued nasal bleeding. Currently pt without active bleeding. 7.5 cm rapid rhino in right nasal cavity. No active blood or clot in oral cavity. Left nasal cavity is dry. The cuff on the rapid rhino is inflated and maintaining pressure.

## 2018-04-09 DIAGNOSIS — D62 ACUTE POSTHEMORRHAGIC ANEMIA: ICD-10-CM

## 2018-04-09 DIAGNOSIS — R04.0 EPISTAXIS: ICD-10-CM

## 2018-04-09 DIAGNOSIS — Z88.8 ALLERGY STATUS TO OTHER DRUGS, MEDICAMENTS AND BIOLOGICAL SUBSTANCES: ICD-10-CM

## 2018-04-09 DIAGNOSIS — Z88.5 ALLERGY STATUS TO NARCOTIC AGENT: ICD-10-CM

## 2018-04-09 DIAGNOSIS — Z91.013 ALLERGY TO SEAFOOD: ICD-10-CM

## 2018-04-09 DIAGNOSIS — D69.6 THROMBOCYTOPENIA, UNSPECIFIED: ICD-10-CM

## 2019-04-29 NOTE — PROVIDER CONTACT NOTE (OTHER) - DATE AND TIME:
03-Apr-2018 07:56 Inflammation Suggestive Of Cancer Camouflage Histology Text: There was a dense lymphocytic infiltrate which prevented adequate histologic evaluation of adjacent structures.

## 2020-01-01 ENCOUNTER — APPOINTMENT (OUTPATIENT)
Dept: GASTROENTEROLOGY | Facility: CLINIC | Age: 65
End: 2020-01-01
Payer: MEDICARE

## 2020-01-01 ENCOUNTER — OUTPATIENT (OUTPATIENT)
Dept: INPATIENT UNIT | Facility: HOSPITAL | Age: 65
LOS: 1 days | Discharge: HOME | End: 2020-01-01
Payer: MEDICARE

## 2020-01-01 ENCOUNTER — TRANSCRIPTION ENCOUNTER (OUTPATIENT)
Age: 65
End: 2020-01-01

## 2020-01-01 ENCOUNTER — APPOINTMENT (OUTPATIENT)
Dept: CARDIOLOGY | Facility: CLINIC | Age: 65
End: 2020-01-01
Payer: MEDICARE

## 2020-01-01 ENCOUNTER — INPATIENT (INPATIENT)
Facility: HOSPITAL | Age: 65
LOS: 2 days | Discharge: HOME | DRG: 375 | End: 2020-02-03
Attending: INTERNAL MEDICINE | Admitting: FAMILY MEDICINE
Payer: MEDICARE

## 2020-01-01 ENCOUNTER — EMERGENCY (EMERGENCY)
Facility: HOSPITAL | Age: 65
LOS: 0 days | Discharge: HOME | End: 2020-09-17
Attending: EMERGENCY MEDICINE
Payer: MEDICARE

## 2020-01-01 ENCOUNTER — NON-APPOINTMENT (OUTPATIENT)
Age: 65
End: 2020-01-01

## 2020-01-01 ENCOUNTER — INPATIENT (INPATIENT)
Facility: HOSPITAL | Age: 65
LOS: 10 days | Discharge: HOSPICE | DRG: 846 | End: 2020-10-23
Attending: FAMILY MEDICINE | Admitting: FAMILY MEDICINE
Payer: MEDICARE

## 2020-01-01 ENCOUNTER — INPATIENT (INPATIENT)
Facility: HOSPITAL | Age: 65
LOS: 6 days | Discharge: HOME | DRG: 375 | End: 2020-02-11
Attending: SURGERY | Admitting: SURGERY
Payer: MEDICARE

## 2020-01-01 ENCOUNTER — EMERGENCY (EMERGENCY)
Facility: HOSPITAL | Age: 65
LOS: 0 days | Discharge: HOME | End: 2020-03-03
Attending: EMERGENCY MEDICINE
Payer: MEDICARE

## 2020-01-01 VITALS
WEIGHT: 158 LBS | DIASTOLIC BLOOD PRESSURE: 82 MMHG | BODY MASS INDEX: 22.04 KG/M2 | SYSTOLIC BLOOD PRESSURE: 122 MMHG | HEART RATE: 78 BPM

## 2020-01-01 VITALS
SYSTOLIC BLOOD PRESSURE: 116 MMHG | TEMPERATURE: 99 F | RESPIRATION RATE: 18 BRPM | OXYGEN SATURATION: 94 % | HEART RATE: 72 BPM | DIASTOLIC BLOOD PRESSURE: 55 MMHG

## 2020-01-01 VITALS
SYSTOLIC BLOOD PRESSURE: 135 MMHG | TEMPERATURE: 98 F | HEART RATE: 81 BPM | OXYGEN SATURATION: 96 % | RESPIRATION RATE: 16 BRPM | DIASTOLIC BLOOD PRESSURE: 83 MMHG

## 2020-01-01 VITALS
DIASTOLIC BLOOD PRESSURE: 78 MMHG | TEMPERATURE: 98 F | OXYGEN SATURATION: 96 % | HEART RATE: 98 BPM | SYSTOLIC BLOOD PRESSURE: 126 MMHG | RESPIRATION RATE: 17 BRPM

## 2020-01-01 VITALS
WEIGHT: 170 LBS | DIASTOLIC BLOOD PRESSURE: 71 MMHG | OXYGEN SATURATION: 100 % | HEIGHT: 71 IN | HEART RATE: 76 BPM | BODY MASS INDEX: 23.8 KG/M2 | SYSTOLIC BLOOD PRESSURE: 108 MMHG

## 2020-01-01 VITALS
HEART RATE: 74 BPM | WEIGHT: 160 LBS | TEMPERATURE: 98.3 F | SYSTOLIC BLOOD PRESSURE: 118 MMHG | DIASTOLIC BLOOD PRESSURE: 71 MMHG | BODY MASS INDEX: 22.32 KG/M2

## 2020-01-01 VITALS — HEIGHT: 68 IN | WEIGHT: 182.1 LBS

## 2020-01-01 VITALS
SYSTOLIC BLOOD PRESSURE: 127 MMHG | TEMPERATURE: 98 F | HEART RATE: 78 BPM | RESPIRATION RATE: 18 BRPM | DIASTOLIC BLOOD PRESSURE: 57 MMHG | OXYGEN SATURATION: 94 %

## 2020-01-01 VITALS
OXYGEN SATURATION: 99 % | DIASTOLIC BLOOD PRESSURE: 80 MMHG | WEIGHT: 162.04 LBS | HEART RATE: 88 BPM | SYSTOLIC BLOOD PRESSURE: 128 MMHG | HEIGHT: 69 IN | TEMPERATURE: 98 F | RESPIRATION RATE: 16 BRPM

## 2020-01-01 VITALS — WEIGHT: 179.9 LBS | HEIGHT: 68 IN

## 2020-01-01 VITALS
OXYGEN SATURATION: 97 % | HEART RATE: 79 BPM | DIASTOLIC BLOOD PRESSURE: 76 MMHG | SYSTOLIC BLOOD PRESSURE: 136 MMHG | TEMPERATURE: 98 F | RESPIRATION RATE: 18 BRPM

## 2020-01-01 VITALS
HEART RATE: 86 BPM | SYSTOLIC BLOOD PRESSURE: 127 MMHG | DIASTOLIC BLOOD PRESSURE: 66 MMHG | OXYGEN SATURATION: 99 % | TEMPERATURE: 98 F | RESPIRATION RATE: 14 BRPM

## 2020-01-01 VITALS — WEIGHT: 160.06 LBS | HEIGHT: 69 IN

## 2020-01-01 VITALS — WEIGHT: 154.98 LBS | HEIGHT: 69 IN

## 2020-01-01 VITALS — HEIGHT: 69 IN | WEIGHT: 145.06 LBS

## 2020-01-01 DIAGNOSIS — R10.9 UNSPECIFIED ABDOMINAL PAIN: ICD-10-CM

## 2020-01-01 DIAGNOSIS — M79.89 OTHER SPECIFIED SOFT TISSUE DISORDERS: ICD-10-CM

## 2020-01-01 DIAGNOSIS — R11.2 NAUSEA WITH VOMITING, UNSPECIFIED: ICD-10-CM

## 2020-01-01 DIAGNOSIS — C78.6 SECONDARY MALIGNANT NEOPLASM OF RETROPERITONEUM AND PERITONEUM: ICD-10-CM

## 2020-01-01 DIAGNOSIS — R18.0 MALIGNANT ASCITES: ICD-10-CM

## 2020-01-01 DIAGNOSIS — R11.0 NAUSEA: ICD-10-CM

## 2020-01-01 DIAGNOSIS — D68.69 OTHER THROMBOPHILIA: ICD-10-CM

## 2020-01-01 DIAGNOSIS — Z88.8 ALLERGY STATUS TO OTHER DRUGS, MEDICAMENTS AND BIOLOGICAL SUBSTANCES STATUS: ICD-10-CM

## 2020-01-01 DIAGNOSIS — K44.9 DIAPHRAGMATIC HERNIA WITHOUT OBSTRUCTION OR GANGRENE: ICD-10-CM

## 2020-01-01 DIAGNOSIS — K56.609 UNSPECIFIED INTESTINAL OBSTRUCTION, UNSPECIFIED AS TO PARTIAL VERSUS COMPLETE OBSTRUCTION: ICD-10-CM

## 2020-01-01 DIAGNOSIS — C56.9 MALIGNANT NEOPLASM OF UNSPECIFIED OVARY: ICD-10-CM

## 2020-01-01 DIAGNOSIS — D63.0 ANEMIA IN NEOPLASTIC DISEASE: ICD-10-CM

## 2020-01-01 DIAGNOSIS — Z88.5 ALLERGY STATUS TO NARCOTIC AGENT: ICD-10-CM

## 2020-01-01 DIAGNOSIS — D64.9 ANEMIA, UNSPECIFIED: ICD-10-CM

## 2020-01-01 DIAGNOSIS — Z86.711 PERSONAL HISTORY OF PULMONARY EMBOLISM: ICD-10-CM

## 2020-01-01 DIAGNOSIS — K59.00 CONSTIPATION, UNSPECIFIED: ICD-10-CM

## 2020-01-01 DIAGNOSIS — B37.0 CANDIDAL STOMATITIS: ICD-10-CM

## 2020-01-01 DIAGNOSIS — R94.31 ABNORMAL ELECTROCARDIOGRAM [ECG] [EKG]: ICD-10-CM

## 2020-01-01 DIAGNOSIS — Z29.9 ENCOUNTER FOR PROPHYLACTIC MEASURES, UNSPECIFIED: ICD-10-CM

## 2020-01-01 DIAGNOSIS — K59.09 OTHER CONSTIPATION: ICD-10-CM

## 2020-01-01 DIAGNOSIS — E87.6 HYPOKALEMIA: ICD-10-CM

## 2020-01-01 DIAGNOSIS — E44.0 MODERATE PROTEIN-CALORIE MALNUTRITION: ICD-10-CM

## 2020-01-01 DIAGNOSIS — E55.9 VITAMIN D DEFICIENCY, UNSPECIFIED: ICD-10-CM

## 2020-01-01 DIAGNOSIS — C48.2 MALIGNANT NEOPLASM OF PERITONEUM, UNSPECIFIED: ICD-10-CM

## 2020-01-01 DIAGNOSIS — R18.8 OTHER ASCITES: ICD-10-CM

## 2020-01-01 DIAGNOSIS — N85.8 OTHER SPECIFIED NONINFLAMMATORY DISORDERS OF UTERUS: ICD-10-CM

## 2020-01-01 DIAGNOSIS — Z51.5 ENCOUNTER FOR PALLIATIVE CARE: ICD-10-CM

## 2020-01-01 DIAGNOSIS — Z88.8 ALLERGY STATUS TO OTHER DRUGS, MEDICAMENTS AND BIOLOGICAL SUBSTANCES: ICD-10-CM

## 2020-01-01 DIAGNOSIS — Z51.11 ENCOUNTER FOR ANTINEOPLASTIC CHEMOTHERAPY: ICD-10-CM

## 2020-01-01 DIAGNOSIS — Z91.013 ALLERGY TO SEAFOOD: ICD-10-CM

## 2020-01-01 DIAGNOSIS — E86.0 DEHYDRATION: ICD-10-CM

## 2020-01-01 DIAGNOSIS — J96.01 ACUTE RESPIRATORY FAILURE WITH HYPOXIA: ICD-10-CM

## 2020-01-01 DIAGNOSIS — Z66 DO NOT RESUSCITATE: ICD-10-CM

## 2020-01-01 DIAGNOSIS — D72.829 ELEVATED WHITE BLOOD CELL COUNT, UNSPECIFIED: ICD-10-CM

## 2020-01-01 DIAGNOSIS — E43 UNSPECIFIED SEVERE PROTEIN-CALORIE MALNUTRITION: ICD-10-CM

## 2020-01-01 DIAGNOSIS — R19.00 INTRA-ABDOMINAL AND PELVIC SWELLING, MASS AND LUMP, UNSPECIFIED SITE: ICD-10-CM

## 2020-01-01 DIAGNOSIS — Z92.21 PERSONAL HISTORY OF ANTINEOPLASTIC CHEMOTHERAPY: ICD-10-CM

## 2020-01-01 DIAGNOSIS — D63.8 ANEMIA IN OTHER CHRONIC DISEASES CLASSIFIED ELSEWHERE: ICD-10-CM

## 2020-01-01 DIAGNOSIS — Z91.018 ALLERGY TO OTHER FOODS: ICD-10-CM

## 2020-01-01 DIAGNOSIS — E88.09 OTHER DISORDERS OF PLASMA-PROTEIN METABOLISM, NOT ELSEWHERE CLASSIFIED: ICD-10-CM

## 2020-01-01 DIAGNOSIS — Z79.01 LONG TERM (CURRENT) USE OF ANTICOAGULANTS: ICD-10-CM

## 2020-01-01 DIAGNOSIS — I63.9 CEREBRAL INFARCTION, UNSPECIFIED: ICD-10-CM

## 2020-01-01 DIAGNOSIS — J69.0 PNEUMONITIS DUE TO INHALATION OF FOOD AND VOMIT: ICD-10-CM

## 2020-01-01 DIAGNOSIS — Z79.1 LONG TERM (CURRENT) USE OF NON-STEROIDAL ANTI-INFLAMMATORIES (NSAID): ICD-10-CM

## 2020-01-01 DIAGNOSIS — R91.1 SOLITARY PULMONARY NODULE: ICD-10-CM

## 2020-01-01 DIAGNOSIS — D68.59 OTHER PRIMARY THROMBOPHILIA: ICD-10-CM

## 2020-01-01 DIAGNOSIS — K22.4 DYSKINESIA OF ESOPHAGUS: ICD-10-CM

## 2020-01-01 DIAGNOSIS — K21.9 GASTRO-ESOPHAGEAL REFLUX DISEASE WITHOUT ESOPHAGITIS: ICD-10-CM

## 2020-01-01 DIAGNOSIS — Z79.83 LONG TERM (CURRENT) USE OF BISPHOSPHONATES: ICD-10-CM

## 2020-01-01 DIAGNOSIS — R10.84 GENERALIZED ABDOMINAL PAIN: ICD-10-CM

## 2020-01-01 DIAGNOSIS — E46 UNSPECIFIED PROTEIN-CALORIE MALNUTRITION: ICD-10-CM

## 2020-01-01 DIAGNOSIS — Z91.09 OTHER ALLERGY STATUS, OTHER THAN TO DRUGS AND BIOLOGICAL SUBSTANCES: ICD-10-CM

## 2020-01-01 LAB
24R-OH-CALCIDIOL SERPL-MCNC: 21.5 NG/ML — LOW (ref 30–80)
ALBUMIN SERPL ELPH-MCNC: 2 G/DL — LOW (ref 3.3–5)
ALBUMIN SERPL ELPH-MCNC: 2 G/DL — LOW (ref 3.3–5)
ALBUMIN SERPL ELPH-MCNC: 2.1 G/DL — LOW (ref 3.3–5)
ALBUMIN SERPL ELPH-MCNC: 2.2 G/DL — LOW (ref 3.3–5)
ALBUMIN SERPL ELPH-MCNC: 2.2 G/DL — LOW (ref 3.3–5)
ALBUMIN SERPL ELPH-MCNC: 2.3 G/DL — LOW (ref 3.3–5)
ALBUMIN SERPL ELPH-MCNC: 2.4 G/DL — LOW (ref 3.3–5)
ALBUMIN SERPL ELPH-MCNC: 2.5 G/DL — LOW (ref 3.3–5)
ALBUMIN SERPL ELPH-MCNC: 3.6 G/DL — SIGNIFICANT CHANGE UP (ref 3.3–5)
ALP SERPL-CCNC: 104 U/L — SIGNIFICANT CHANGE UP (ref 40–120)
ALP SERPL-CCNC: 117 U/L — SIGNIFICANT CHANGE UP (ref 40–120)
ALP SERPL-CCNC: 175 U/L — HIGH (ref 40–120)
ALP SERPL-CCNC: 211 U/L — HIGH (ref 40–120)
ALP SERPL-CCNC: 213 U/L — HIGH (ref 40–120)
ALP SERPL-CCNC: 64 U/L — SIGNIFICANT CHANGE UP (ref 40–120)
ALP SERPL-CCNC: 68 U/L — SIGNIFICANT CHANGE UP (ref 40–120)
ALP SERPL-CCNC: 71 U/L — SIGNIFICANT CHANGE UP (ref 40–120)
ALP SERPL-CCNC: 80 U/L — SIGNIFICANT CHANGE UP (ref 40–120)
ALT FLD-CCNC: 12 U/L — SIGNIFICANT CHANGE UP (ref 12–78)
ALT FLD-CCNC: 20 U/L — SIGNIFICANT CHANGE UP (ref 12–78)
ALT FLD-CCNC: 21 U/L — SIGNIFICANT CHANGE UP (ref 12–78)
ALT FLD-CCNC: 26 U/L — SIGNIFICANT CHANGE UP (ref 12–78)
ALT FLD-CCNC: 34 U/L — SIGNIFICANT CHANGE UP (ref 12–78)
ALT FLD-CCNC: 44 U/L — SIGNIFICANT CHANGE UP (ref 12–78)
ALT FLD-CCNC: 75 U/L — SIGNIFICANT CHANGE UP (ref 12–78)
ALT FLD-CCNC: 9 U/L — LOW (ref 12–78)
ALT FLD-CCNC: 9 U/L — LOW (ref 12–78)
AMMONIA BLD-MCNC: 20 UMOL/L — SIGNIFICANT CHANGE UP (ref 11–32)
ANION GAP SERPL CALC-SCNC: 5 MMOL/L — SIGNIFICANT CHANGE UP (ref 5–17)
ANION GAP SERPL CALC-SCNC: 6 MMOL/L — SIGNIFICANT CHANGE UP (ref 5–17)
ANION GAP SERPL CALC-SCNC: 7 MMOL/L — SIGNIFICANT CHANGE UP (ref 5–17)
ANION GAP SERPL CALC-SCNC: 8 MMOL/L — SIGNIFICANT CHANGE UP (ref 5–17)
ANION GAP SERPL CALC-SCNC: 9 MMOL/L — SIGNIFICANT CHANGE UP (ref 5–17)
APPEARANCE UR: CLEAR — SIGNIFICANT CHANGE UP
APTT BLD: 28.9 SEC — SIGNIFICANT CHANGE UP (ref 27.5–36.3)
APTT BLD: 32.5 SEC — SIGNIFICANT CHANGE UP (ref 27.5–36.3)
APTT BLD: 33.4 SEC — SIGNIFICANT CHANGE UP (ref 27.5–35.5)
APTT BLD: 35.1 SEC — SIGNIFICANT CHANGE UP (ref 27.5–36.3)
AST SERPL-CCNC: 15 U/L — SIGNIFICANT CHANGE UP (ref 15–37)
AST SERPL-CCNC: 17 U/L — SIGNIFICANT CHANGE UP (ref 15–37)
AST SERPL-CCNC: 18 U/L — SIGNIFICANT CHANGE UP (ref 15–37)
AST SERPL-CCNC: 26 U/L — SIGNIFICANT CHANGE UP (ref 15–37)
AST SERPL-CCNC: 26 U/L — SIGNIFICANT CHANGE UP (ref 15–37)
AST SERPL-CCNC: 31 U/L — SIGNIFICANT CHANGE UP (ref 15–37)
AST SERPL-CCNC: 31 U/L — SIGNIFICANT CHANGE UP (ref 15–37)
AST SERPL-CCNC: 41 U/L — HIGH (ref 15–37)
AST SERPL-CCNC: 52 U/L — HIGH (ref 15–37)
BASOPHILS # BLD AUTO: 0.01 K/UL — SIGNIFICANT CHANGE UP (ref 0–0.2)
BASOPHILS # BLD AUTO: 0.02 K/UL — SIGNIFICANT CHANGE UP (ref 0–0.2)
BASOPHILS # BLD AUTO: 0.03 K/UL — SIGNIFICANT CHANGE UP (ref 0–0.2)
BASOPHILS # BLD AUTO: 0.06 K/UL — SIGNIFICANT CHANGE UP (ref 0–0.2)
BASOPHILS # BLD AUTO: 0.06 K/UL — SIGNIFICANT CHANGE UP (ref 0–0.2)
BASOPHILS NFR BLD AUTO: 0.2 % — SIGNIFICANT CHANGE UP (ref 0–2)
BASOPHILS NFR BLD AUTO: 0.2 % — SIGNIFICANT CHANGE UP (ref 0–2)
BASOPHILS NFR BLD AUTO: 0.3 % — SIGNIFICANT CHANGE UP (ref 0–2)
BASOPHILS NFR BLD AUTO: 0.3 % — SIGNIFICANT CHANGE UP (ref 0–2)
BASOPHILS NFR BLD AUTO: 0.4 % — SIGNIFICANT CHANGE UP (ref 0–2)
BASOPHILS NFR BLD AUTO: 0.5 % — SIGNIFICANT CHANGE UP (ref 0–2)
BASOPHILS NFR BLD AUTO: 0.5 % — SIGNIFICANT CHANGE UP (ref 0–2)
BASOPHILS NFR BLD AUTO: 0.6 % — SIGNIFICANT CHANGE UP (ref 0–2)
BILIRUB SERPL-MCNC: 0.2 MG/DL — SIGNIFICANT CHANGE UP (ref 0.2–1.2)
BILIRUB SERPL-MCNC: 0.2 MG/DL — SIGNIFICANT CHANGE UP (ref 0.2–1.2)
BILIRUB SERPL-MCNC: 0.3 MG/DL — SIGNIFICANT CHANGE UP (ref 0.2–1.2)
BILIRUB SERPL-MCNC: 0.4 MG/DL — SIGNIFICANT CHANGE UP (ref 0.2–1.2)
BILIRUB SERPL-MCNC: 0.4 MG/DL — SIGNIFICANT CHANGE UP (ref 0.2–1.2)
BILIRUB SERPL-MCNC: 0.5 MG/DL — SIGNIFICANT CHANGE UP (ref 0.2–1.2)
BILIRUB SERPL-MCNC: 0.7 MG/DL — SIGNIFICANT CHANGE UP (ref 0.2–1.2)
BILIRUB UR-MCNC: ABNORMAL
BILIRUB UR-MCNC: NEGATIVE — SIGNIFICANT CHANGE UP
BILIRUB UR-MCNC: NEGATIVE — SIGNIFICANT CHANGE UP
BUN SERPL-MCNC: 11 MG/DL — SIGNIFICANT CHANGE UP (ref 7–23)
BUN SERPL-MCNC: 12 MG/DL — SIGNIFICANT CHANGE UP (ref 7–23)
BUN SERPL-MCNC: 12 MG/DL — SIGNIFICANT CHANGE UP (ref 7–23)
BUN SERPL-MCNC: 13 MG/DL — SIGNIFICANT CHANGE UP (ref 7–23)
BUN SERPL-MCNC: 13 MG/DL — SIGNIFICANT CHANGE UP (ref 7–23)
BUN SERPL-MCNC: 14 MG/DL — SIGNIFICANT CHANGE UP (ref 7–23)
BUN SERPL-MCNC: 14 MG/DL — SIGNIFICANT CHANGE UP (ref 7–23)
BUN SERPL-MCNC: 15 MG/DL — SIGNIFICANT CHANGE UP (ref 7–23)
BUN SERPL-MCNC: 16 MG/DL — SIGNIFICANT CHANGE UP (ref 7–23)
BUN SERPL-MCNC: 16 MG/DL — SIGNIFICANT CHANGE UP (ref 7–23)
BUN SERPL-MCNC: 17 MG/DL — SIGNIFICANT CHANGE UP (ref 7–23)
BUN SERPL-MCNC: 18 MG/DL — SIGNIFICANT CHANGE UP (ref 7–23)
BUN SERPL-MCNC: 18 MG/DL — SIGNIFICANT CHANGE UP (ref 7–23)
BUN SERPL-MCNC: 22 MG/DL — SIGNIFICANT CHANGE UP (ref 7–23)
BUN SERPL-MCNC: 26 MG/DL — HIGH (ref 7–23)
BUN SERPL-MCNC: 8 MG/DL — SIGNIFICANT CHANGE UP (ref 7–23)
BUN SERPL-MCNC: 8 MG/DL — SIGNIFICANT CHANGE UP (ref 7–23)
BUN SERPL-MCNC: 9 MG/DL — SIGNIFICANT CHANGE UP (ref 7–23)
CALCIUM SERPL-MCNC: 8 MG/DL — LOW (ref 8.5–10.1)
CALCIUM SERPL-MCNC: 8.3 MG/DL — LOW (ref 8.5–10.1)
CALCIUM SERPL-MCNC: 8.3 MG/DL — LOW (ref 8.5–10.1)
CALCIUM SERPL-MCNC: 8.6 MG/DL — SIGNIFICANT CHANGE UP (ref 8.5–10.1)
CALCIUM SERPL-MCNC: 8.7 MG/DL — SIGNIFICANT CHANGE UP (ref 8.5–10.1)
CALCIUM SERPL-MCNC: 8.7 MG/DL — SIGNIFICANT CHANGE UP (ref 8.5–10.1)
CALCIUM SERPL-MCNC: 8.8 MG/DL — SIGNIFICANT CHANGE UP (ref 8.5–10.1)
CALCIUM SERPL-MCNC: 8.9 MG/DL — SIGNIFICANT CHANGE UP (ref 8.5–10.1)
CALCIUM SERPL-MCNC: 9.2 MG/DL — SIGNIFICANT CHANGE UP (ref 8.5–10.1)
CALCIUM SERPL-MCNC: 9.4 MG/DL — SIGNIFICANT CHANGE UP (ref 8.5–10.1)
CALCIUM SERPL-MCNC: 9.7 MG/DL — SIGNIFICANT CHANGE UP (ref 8.5–10.1)
CANCER AG125 SERPL-ACNC: 152 U/ML — HIGH
CHLORIDE SERPL-SCNC: 100 MMOL/L — SIGNIFICANT CHANGE UP (ref 96–108)
CHLORIDE SERPL-SCNC: 101 MMOL/L — SIGNIFICANT CHANGE UP (ref 96–108)
CHLORIDE SERPL-SCNC: 102 MMOL/L — SIGNIFICANT CHANGE UP (ref 96–108)
CHLORIDE SERPL-SCNC: 103 MMOL/L — SIGNIFICANT CHANGE UP (ref 96–108)
CHLORIDE SERPL-SCNC: 103 MMOL/L — SIGNIFICANT CHANGE UP (ref 96–108)
CHLORIDE SERPL-SCNC: 104 MMOL/L — SIGNIFICANT CHANGE UP (ref 96–108)
CHLORIDE SERPL-SCNC: 104 MMOL/L — SIGNIFICANT CHANGE UP (ref 96–108)
CHLORIDE SERPL-SCNC: 105 MMOL/L — SIGNIFICANT CHANGE UP (ref 96–108)
CHLORIDE SERPL-SCNC: 106 MMOL/L — SIGNIFICANT CHANGE UP (ref 96–108)
CHLORIDE SERPL-SCNC: 106 MMOL/L — SIGNIFICANT CHANGE UP (ref 96–108)
CHLORIDE SERPL-SCNC: 107 MMOL/L — SIGNIFICANT CHANGE UP (ref 96–108)
CHLORIDE SERPL-SCNC: 108 MMOL/L — SIGNIFICANT CHANGE UP (ref 96–108)
CHLORIDE SERPL-SCNC: 99 MMOL/L — SIGNIFICANT CHANGE UP (ref 96–108)
CHLORIDE SERPL-SCNC: 99 MMOL/L — SIGNIFICANT CHANGE UP (ref 96–108)
CHOLEST SERPL-MCNC: 153 MG/DL — SIGNIFICANT CHANGE UP
CO2 SERPL-SCNC: 23 MMOL/L — SIGNIFICANT CHANGE UP (ref 22–31)
CO2 SERPL-SCNC: 24 MMOL/L — SIGNIFICANT CHANGE UP (ref 22–31)
CO2 SERPL-SCNC: 25 MMOL/L — SIGNIFICANT CHANGE UP (ref 22–31)
CO2 SERPL-SCNC: 26 MMOL/L — SIGNIFICANT CHANGE UP (ref 22–31)
CO2 SERPL-SCNC: 26 MMOL/L — SIGNIFICANT CHANGE UP (ref 22–31)
CO2 SERPL-SCNC: 27 MMOL/L — SIGNIFICANT CHANGE UP (ref 22–31)
CO2 SERPL-SCNC: 28 MMOL/L — SIGNIFICANT CHANGE UP (ref 22–31)
CO2 SERPL-SCNC: 28 MMOL/L — SIGNIFICANT CHANGE UP (ref 22–31)
CO2 SERPL-SCNC: 29 MMOL/L — SIGNIFICANT CHANGE UP (ref 22–31)
CO2 SERPL-SCNC: 30 MMOL/L — SIGNIFICANT CHANGE UP (ref 22–31)
COLOR SPEC: YELLOW — SIGNIFICANT CHANGE UP
CREAT SERPL-MCNC: 0.5 MG/DL — SIGNIFICANT CHANGE UP (ref 0.5–1.3)
CREAT SERPL-MCNC: 0.52 MG/DL — SIGNIFICANT CHANGE UP (ref 0.5–1.3)
CREAT SERPL-MCNC: 0.61 MG/DL — SIGNIFICANT CHANGE UP (ref 0.5–1.3)
CREAT SERPL-MCNC: 0.64 MG/DL — SIGNIFICANT CHANGE UP (ref 0.5–1.3)
CREAT SERPL-MCNC: 0.64 MG/DL — SIGNIFICANT CHANGE UP (ref 0.5–1.3)
CREAT SERPL-MCNC: 0.65 MG/DL — SIGNIFICANT CHANGE UP (ref 0.5–1.3)
CREAT SERPL-MCNC: 0.65 MG/DL — SIGNIFICANT CHANGE UP (ref 0.5–1.3)
CREAT SERPL-MCNC: 0.67 MG/DL — SIGNIFICANT CHANGE UP (ref 0.5–1.3)
CREAT SERPL-MCNC: 0.72 MG/DL — SIGNIFICANT CHANGE UP (ref 0.5–1.3)
CREAT SERPL-MCNC: 0.75 MG/DL — SIGNIFICANT CHANGE UP (ref 0.5–1.3)
CREAT SERPL-MCNC: 0.75 MG/DL — SIGNIFICANT CHANGE UP (ref 0.5–1.3)
CREAT SERPL-MCNC: 0.76 MG/DL — SIGNIFICANT CHANGE UP (ref 0.5–1.3)
CREAT SERPL-MCNC: 0.78 MG/DL — SIGNIFICANT CHANGE UP (ref 0.5–1.3)
CREAT SERPL-MCNC: 0.78 MG/DL — SIGNIFICANT CHANGE UP (ref 0.5–1.3)
CREAT SERPL-MCNC: 0.83 MG/DL — SIGNIFICANT CHANGE UP (ref 0.5–1.3)
CREAT SERPL-MCNC: 0.84 MG/DL — SIGNIFICANT CHANGE UP (ref 0.5–1.3)
CREAT SERPL-MCNC: 0.85 MG/DL — SIGNIFICANT CHANGE UP (ref 0.5–1.3)
CREAT SERPL-MCNC: 0.85 MG/DL — SIGNIFICANT CHANGE UP (ref 0.5–1.3)
CULTURE RESULTS: SIGNIFICANT CHANGE UP
CULTURE RESULTS: SIGNIFICANT CHANGE UP
DIFF PNL FLD: NEGATIVE — SIGNIFICANT CHANGE UP
EOSINOPHIL # BLD AUTO: 0 K/UL — SIGNIFICANT CHANGE UP (ref 0–0.5)
EOSINOPHIL # BLD AUTO: 0.01 K/UL — SIGNIFICANT CHANGE UP (ref 0–0.5)
EOSINOPHIL # BLD AUTO: 0.01 K/UL — SIGNIFICANT CHANGE UP (ref 0–0.5)
EOSINOPHIL # BLD AUTO: 0.02 K/UL — SIGNIFICANT CHANGE UP (ref 0–0.5)
EOSINOPHIL # BLD AUTO: 0.03 K/UL — SIGNIFICANT CHANGE UP (ref 0–0.5)
EOSINOPHIL # BLD AUTO: 0.05 K/UL — SIGNIFICANT CHANGE UP (ref 0–0.5)
EOSINOPHIL # BLD AUTO: 0.06 K/UL — SIGNIFICANT CHANGE UP (ref 0–0.5)
EOSINOPHIL # BLD AUTO: 0.07 K/UL — SIGNIFICANT CHANGE UP (ref 0–0.5)
EOSINOPHIL # BLD AUTO: 0.09 K/UL — SIGNIFICANT CHANGE UP (ref 0–0.5)
EOSINOPHIL # BLD AUTO: 0.13 K/UL — SIGNIFICANT CHANGE UP (ref 0–0.5)
EOSINOPHIL NFR BLD AUTO: 0 % — SIGNIFICANT CHANGE UP (ref 0–6)
EOSINOPHIL NFR BLD AUTO: 0.2 % — SIGNIFICANT CHANGE UP (ref 0–6)
EOSINOPHIL NFR BLD AUTO: 0.2 % — SIGNIFICANT CHANGE UP (ref 0–6)
EOSINOPHIL NFR BLD AUTO: 0.4 % — SIGNIFICANT CHANGE UP (ref 0–6)
EOSINOPHIL NFR BLD AUTO: 0.6 % — SIGNIFICANT CHANGE UP (ref 0–6)
EOSINOPHIL NFR BLD AUTO: 1.1 % — SIGNIFICANT CHANGE UP (ref 0–6)
EOSINOPHIL NFR BLD AUTO: 2.2 % — SIGNIFICANT CHANGE UP (ref 0–6)
FOLATE SERPL-MCNC: 6.5 NG/ML — SIGNIFICANT CHANGE UP
GLUCOSE SERPL-MCNC: 102 MG/DL — HIGH (ref 70–99)
GLUCOSE SERPL-MCNC: 108 MG/DL — HIGH (ref 70–99)
GLUCOSE SERPL-MCNC: 109 MG/DL — HIGH (ref 70–99)
GLUCOSE SERPL-MCNC: 110 MG/DL — HIGH (ref 70–99)
GLUCOSE SERPL-MCNC: 118 MG/DL — HIGH (ref 70–99)
GLUCOSE SERPL-MCNC: 118 MG/DL — HIGH (ref 70–99)
GLUCOSE SERPL-MCNC: 138 MG/DL — HIGH (ref 70–99)
GLUCOSE SERPL-MCNC: 143 MG/DL — HIGH (ref 70–99)
GLUCOSE SERPL-MCNC: 145 MG/DL — HIGH (ref 70–99)
GLUCOSE SERPL-MCNC: 86 MG/DL — SIGNIFICANT CHANGE UP (ref 70–99)
GLUCOSE SERPL-MCNC: 87 MG/DL — SIGNIFICANT CHANGE UP (ref 70–99)
GLUCOSE SERPL-MCNC: 88 MG/DL — SIGNIFICANT CHANGE UP (ref 70–99)
GLUCOSE SERPL-MCNC: 89 MG/DL — SIGNIFICANT CHANGE UP (ref 70–99)
GLUCOSE SERPL-MCNC: 90 MG/DL — SIGNIFICANT CHANGE UP (ref 70–99)
GLUCOSE SERPL-MCNC: 91 MG/DL — SIGNIFICANT CHANGE UP (ref 70–99)
GLUCOSE SERPL-MCNC: 92 MG/DL — SIGNIFICANT CHANGE UP (ref 70–99)
GLUCOSE SERPL-MCNC: 93 MG/DL — SIGNIFICANT CHANGE UP (ref 70–99)
GLUCOSE SERPL-MCNC: 99 MG/DL — SIGNIFICANT CHANGE UP (ref 70–99)
GLUCOSE UR QL: NEGATIVE MG/DL — SIGNIFICANT CHANGE UP
HCT VFR BLD CALC: 27.3 % — LOW (ref 34.5–45)
HCT VFR BLD CALC: 27.7 % — LOW (ref 34.5–45)
HCT VFR BLD CALC: 28.5 % — LOW (ref 34.5–45)
HCT VFR BLD CALC: 29.4 % — LOW (ref 34.5–45)
HCT VFR BLD CALC: 30.5 % — LOW (ref 34.5–45)
HCT VFR BLD CALC: 30.6 % — LOW (ref 34.5–45)
HCT VFR BLD CALC: 30.8 % — LOW (ref 34.5–45)
HCT VFR BLD CALC: 31.2 % — LOW (ref 34.5–45)
HCT VFR BLD CALC: 31.4 % — LOW (ref 34.5–45)
HCT VFR BLD CALC: 31.6 % — LOW (ref 34.5–45)
HCT VFR BLD CALC: 32 % — LOW (ref 34.5–45)
HCT VFR BLD CALC: 32.2 % — LOW (ref 34.5–45)
HCT VFR BLD CALC: 34.3 % — LOW (ref 34.5–45)
HCT VFR BLD CALC: 34.7 % — SIGNIFICANT CHANGE UP (ref 34.5–45)
HCT VFR BLD CALC: 34.8 % — SIGNIFICANT CHANGE UP (ref 34.5–45)
HCT VFR BLD CALC: 36.6 % — SIGNIFICANT CHANGE UP (ref 34.5–45)
HCT VFR BLD CALC: 38.5 % — SIGNIFICANT CHANGE UP (ref 34.5–45)
HCV AB S/CO SERPL IA: 0.2 S/CO — SIGNIFICANT CHANGE UP (ref 0–0.99)
HCV AB SERPL-IMP: SIGNIFICANT CHANGE UP
HDLC SERPL-MCNC: 58 MG/DL — SIGNIFICANT CHANGE UP
HGB BLD-MCNC: 10 G/DL — LOW (ref 11.5–15.5)
HGB BLD-MCNC: 10 G/DL — LOW (ref 11.5–15.5)
HGB BLD-MCNC: 10.1 G/DL — LOW (ref 11.5–15.5)
HGB BLD-MCNC: 10.3 G/DL — LOW (ref 11.5–15.5)
HGB BLD-MCNC: 10.4 G/DL — LOW (ref 11.5–15.5)
HGB BLD-MCNC: 10.5 G/DL — LOW (ref 11.5–15.5)
HGB BLD-MCNC: 10.7 G/DL — LOW (ref 11.5–15.5)
HGB BLD-MCNC: 10.8 G/DL — LOW (ref 11.5–15.5)
HGB BLD-MCNC: 11.3 G/DL — LOW (ref 11.5–15.5)
HGB BLD-MCNC: 11.3 G/DL — LOW (ref 11.5–15.5)
HGB BLD-MCNC: 11.9 G/DL — SIGNIFICANT CHANGE UP (ref 11.5–15.5)
HGB BLD-MCNC: 12.3 G/DL — SIGNIFICANT CHANGE UP (ref 11.5–15.5)
HGB BLD-MCNC: 9 G/DL — LOW (ref 11.5–15.5)
HGB BLD-MCNC: 9.1 G/DL — LOW (ref 11.5–15.5)
HGB BLD-MCNC: 9.1 G/DL — LOW (ref 11.5–15.5)
HGB BLD-MCNC: 9.5 G/DL — LOW (ref 11.5–15.5)
HGB BLD-MCNC: 9.8 G/DL — LOW (ref 11.5–15.5)
IMM GRANULOCYTES NFR BLD AUTO: 0.3 % — SIGNIFICANT CHANGE UP (ref 0–1.5)
IMM GRANULOCYTES NFR BLD AUTO: 0.4 % — SIGNIFICANT CHANGE UP (ref 0–1.5)
IMM GRANULOCYTES NFR BLD AUTO: 0.5 % — SIGNIFICANT CHANGE UP (ref 0–1.5)
IMM GRANULOCYTES NFR BLD AUTO: 0.6 % — SIGNIFICANT CHANGE UP (ref 0–1.5)
IMM GRANULOCYTES NFR BLD AUTO: 0.6 % — SIGNIFICANT CHANGE UP (ref 0–1.5)
IMM GRANULOCYTES NFR BLD AUTO: 0.7 % — SIGNIFICANT CHANGE UP (ref 0–1.5)
IMM GRANULOCYTES NFR BLD AUTO: 0.9 % — SIGNIFICANT CHANGE UP (ref 0–1.5)
IMM GRANULOCYTES NFR BLD AUTO: 0.9 % — SIGNIFICANT CHANGE UP (ref 0–1.5)
INR BLD: 1.12 RATIO — SIGNIFICANT CHANGE UP (ref 0.88–1.16)
INR BLD: 1.13 RATIO — SIGNIFICANT CHANGE UP (ref 0.88–1.16)
INR BLD: 1.14 RATIO — SIGNIFICANT CHANGE UP (ref 0.88–1.16)
INR BLD: 1.17 RATIO — HIGH (ref 0.88–1.16)
INR BLD: 1.28 RATIO — HIGH (ref 0.88–1.16)
KETONES UR-MCNC: ABNORMAL
LACTATE SERPL-SCNC: 1.4 MMOL/L — SIGNIFICANT CHANGE UP (ref 0.7–2)
LEUKOCYTE ESTERASE UR-ACNC: ABNORMAL
LEUKOCYTE ESTERASE UR-ACNC: NEGATIVE — SIGNIFICANT CHANGE UP
LEUKOCYTE ESTERASE UR-ACNC: NEGATIVE — SIGNIFICANT CHANGE UP
LIDOCAIN IGE QN: 45 U/L — LOW (ref 73–393)
LIDOCAIN IGE QN: 58 U/L — LOW (ref 73–393)
LIDOCAIN IGE QN: 71 U/L — LOW (ref 73–393)
LIPID PNL WITH DIRECT LDL SERPL: 75 MG/DL — SIGNIFICANT CHANGE UP
LYMPHOCYTES # BLD AUTO: 0.67 K/UL — LOW (ref 1–3.3)
LYMPHOCYTES # BLD AUTO: 0.78 K/UL — LOW (ref 1–3.3)
LYMPHOCYTES # BLD AUTO: 0.95 K/UL — LOW (ref 1–3.3)
LYMPHOCYTES # BLD AUTO: 0.98 K/UL — LOW (ref 1–3.3)
LYMPHOCYTES # BLD AUTO: 1.02 K/UL — SIGNIFICANT CHANGE UP (ref 1–3.3)
LYMPHOCYTES # BLD AUTO: 1.06 K/UL — SIGNIFICANT CHANGE UP (ref 1–3.3)
LYMPHOCYTES # BLD AUTO: 1.14 K/UL — SIGNIFICANT CHANGE UP (ref 1–3.3)
LYMPHOCYTES # BLD AUTO: 1.14 K/UL — SIGNIFICANT CHANGE UP (ref 1–3.3)
LYMPHOCYTES # BLD AUTO: 1.15 K/UL — SIGNIFICANT CHANGE UP (ref 1–3.3)
LYMPHOCYTES # BLD AUTO: 1.33 K/UL — SIGNIFICANT CHANGE UP (ref 1–3.3)
LYMPHOCYTES # BLD AUTO: 11.7 % — LOW (ref 13–44)
LYMPHOCYTES # BLD AUTO: 12.8 % — LOW (ref 13–44)
LYMPHOCYTES # BLD AUTO: 16.5 % — SIGNIFICANT CHANGE UP (ref 13–44)
LYMPHOCYTES # BLD AUTO: 16.6 % — SIGNIFICANT CHANGE UP (ref 13–44)
LYMPHOCYTES # BLD AUTO: 18.5 % — SIGNIFICANT CHANGE UP (ref 13–44)
LYMPHOCYTES # BLD AUTO: 18.7 % — SIGNIFICANT CHANGE UP (ref 13–44)
LYMPHOCYTES # BLD AUTO: 21.6 % — SIGNIFICANT CHANGE UP (ref 13–44)
LYMPHOCYTES # BLD AUTO: 28.6 % — SIGNIFICANT CHANGE UP (ref 13–44)
LYMPHOCYTES # BLD AUTO: 38.4 % — SIGNIFICANT CHANGE UP (ref 13–44)
LYMPHOCYTES # BLD AUTO: 8 % — LOW (ref 13–44)
MAGNESIUM SERPL-MCNC: 1.6 MG/DL — SIGNIFICANT CHANGE UP (ref 1.6–2.6)
MAGNESIUM SERPL-MCNC: 1.7 MG/DL — SIGNIFICANT CHANGE UP (ref 1.6–2.6)
MAGNESIUM SERPL-MCNC: 1.8 MG/DL — SIGNIFICANT CHANGE UP (ref 1.6–2.6)
MAGNESIUM SERPL-MCNC: 1.8 MG/DL — SIGNIFICANT CHANGE UP (ref 1.6–2.6)
MAGNESIUM SERPL-MCNC: 1.9 MG/DL — SIGNIFICANT CHANGE UP (ref 1.6–2.6)
MAGNESIUM SERPL-MCNC: 2 MG/DL — SIGNIFICANT CHANGE UP (ref 1.6–2.6)
MAGNESIUM SERPL-MCNC: 2 MG/DL — SIGNIFICANT CHANGE UP (ref 1.6–2.6)
MAGNESIUM SERPL-MCNC: 2.1 MG/DL — SIGNIFICANT CHANGE UP (ref 1.6–2.6)
MCHC RBC-ENTMCNC: 29.3 PG — SIGNIFICANT CHANGE UP (ref 27–34)
MCHC RBC-ENTMCNC: 29.6 PG — SIGNIFICANT CHANGE UP (ref 27–34)
MCHC RBC-ENTMCNC: 29.6 PG — SIGNIFICANT CHANGE UP (ref 27–34)
MCHC RBC-ENTMCNC: 29.8 PG — SIGNIFICANT CHANGE UP (ref 27–34)
MCHC RBC-ENTMCNC: 30.3 PG — SIGNIFICANT CHANGE UP (ref 27–34)
MCHC RBC-ENTMCNC: 31.5 GM/DL — LOW (ref 32–36)
MCHC RBC-ENTMCNC: 31.8 GM/DL — LOW (ref 32–36)
MCHC RBC-ENTMCNC: 31.9 GM/DL — LOW (ref 32–36)
MCHC RBC-ENTMCNC: 31.9 GM/DL — LOW (ref 32–36)
MCHC RBC-ENTMCNC: 32 GM/DL — SIGNIFICANT CHANGE UP (ref 32–36)
MCHC RBC-ENTMCNC: 32.1 GM/DL — SIGNIFICANT CHANGE UP (ref 32–36)
MCHC RBC-ENTMCNC: 32.3 GM/DL — SIGNIFICANT CHANGE UP (ref 32–36)
MCHC RBC-ENTMCNC: 32.5 GM/DL — SIGNIFICANT CHANGE UP (ref 32–36)
MCHC RBC-ENTMCNC: 32.5 GM/DL — SIGNIFICANT CHANGE UP (ref 32–36)
MCHC RBC-ENTMCNC: 32.6 GM/DL — SIGNIFICANT CHANGE UP (ref 32–36)
MCHC RBC-ENTMCNC: 32.7 GM/DL — SIGNIFICANT CHANGE UP (ref 32–36)
MCHC RBC-ENTMCNC: 32.9 GM/DL — SIGNIFICANT CHANGE UP (ref 32–36)
MCHC RBC-ENTMCNC: 32.9 GM/DL — SIGNIFICANT CHANGE UP (ref 32–36)
MCHC RBC-ENTMCNC: 33 GM/DL — SIGNIFICANT CHANGE UP (ref 32–36)
MCHC RBC-ENTMCNC: 33.1 GM/DL — SIGNIFICANT CHANGE UP (ref 32–36)
MCHC RBC-ENTMCNC: 33.4 GM/DL — SIGNIFICANT CHANGE UP (ref 32–36)
MCHC RBC-ENTMCNC: 33.4 GM/DL — SIGNIFICANT CHANGE UP (ref 32–36)
MCHC RBC-ENTMCNC: 33.8 PG — SIGNIFICANT CHANGE UP (ref 27–34)
MCHC RBC-ENTMCNC: 33.9 PG — SIGNIFICANT CHANGE UP (ref 27–34)
MCHC RBC-ENTMCNC: 34.1 PG — HIGH (ref 27–34)
MCHC RBC-ENTMCNC: 34.2 PG — HIGH (ref 27–34)
MCHC RBC-ENTMCNC: 34.2 PG — HIGH (ref 27–34)
MCHC RBC-ENTMCNC: 34.6 PG — HIGH (ref 27–34)
MCHC RBC-ENTMCNC: 34.7 PG — HIGH (ref 27–34)
MCHC RBC-ENTMCNC: 34.7 PG — HIGH (ref 27–34)
MCHC RBC-ENTMCNC: 34.9 PG — HIGH (ref 27–34)
MCV RBC AUTO: 103.6 FL — HIGH (ref 80–100)
MCV RBC AUTO: 103.6 FL — HIGH (ref 80–100)
MCV RBC AUTO: 104.8 FL — HIGH (ref 80–100)
MCV RBC AUTO: 105 FL — HIGH (ref 80–100)
MCV RBC AUTO: 105 FL — HIGH (ref 80–100)
MCV RBC AUTO: 105.2 FL — HIGH (ref 80–100)
MCV RBC AUTO: 105.4 FL — HIGH (ref 80–100)
MCV RBC AUTO: 105.5 FL — HIGH (ref 80–100)
MCV RBC AUTO: 105.6 FL — HIGH (ref 80–100)
MCV RBC AUTO: 105.7 FL — HIGH (ref 80–100)
MCV RBC AUTO: 105.8 FL — HIGH (ref 80–100)
MCV RBC AUTO: 108.4 FL — HIGH (ref 80–100)
MCV RBC AUTO: 91.5 FL — SIGNIFICANT CHANGE UP (ref 80–100)
MCV RBC AUTO: 92.8 FL — SIGNIFICANT CHANGE UP (ref 80–100)
MCV RBC AUTO: 93.1 FL — SIGNIFICANT CHANGE UP (ref 80–100)
MCV RBC AUTO: 93.2 FL — SIGNIFICANT CHANGE UP (ref 80–100)
MCV RBC AUTO: 93.3 FL — SIGNIFICANT CHANGE UP (ref 80–100)
MONOCYTES # BLD AUTO: 0.14 K/UL — SIGNIFICANT CHANGE UP (ref 0–0.9)
MONOCYTES # BLD AUTO: 0.21 K/UL — SIGNIFICANT CHANGE UP (ref 0–0.9)
MONOCYTES # BLD AUTO: 0.28 K/UL — SIGNIFICANT CHANGE UP (ref 0–0.9)
MONOCYTES # BLD AUTO: 0.29 K/UL — SIGNIFICANT CHANGE UP (ref 0–0.9)
MONOCYTES # BLD AUTO: 0.3 K/UL — SIGNIFICANT CHANGE UP (ref 0–0.9)
MONOCYTES # BLD AUTO: 0.32 K/UL — SIGNIFICANT CHANGE UP (ref 0–0.9)
MONOCYTES # BLD AUTO: 0.38 K/UL — SIGNIFICANT CHANGE UP (ref 0–0.9)
MONOCYTES # BLD AUTO: 0.48 K/UL — SIGNIFICANT CHANGE UP (ref 0–0.9)
MONOCYTES # BLD AUTO: 1 K/UL — HIGH (ref 0–0.9)
MONOCYTES # BLD AUTO: 1.02 K/UL — HIGH (ref 0–0.9)
MONOCYTES NFR BLD AUTO: 3 % — SIGNIFICANT CHANGE UP (ref 2–14)
MONOCYTES NFR BLD AUTO: 4.9 % — SIGNIFICANT CHANGE UP (ref 2–14)
MONOCYTES NFR BLD AUTO: 5.3 % — SIGNIFICANT CHANGE UP (ref 2–14)
MONOCYTES NFR BLD AUTO: 5.7 % — SIGNIFICANT CHANGE UP (ref 2–14)
MONOCYTES NFR BLD AUTO: 7.2 % — SIGNIFICANT CHANGE UP (ref 2–14)
MONOCYTES NFR BLD AUTO: 7.7 % — SIGNIFICANT CHANGE UP (ref 2–14)
MONOCYTES NFR BLD AUTO: 7.9 % — SIGNIFICANT CHANGE UP (ref 2–14)
MONOCYTES NFR BLD AUTO: 8 % — SIGNIFICANT CHANGE UP (ref 2–14)
MONOCYTES NFR BLD AUTO: 8.2 % — SIGNIFICANT CHANGE UP (ref 2–14)
MONOCYTES NFR BLD AUTO: 8.8 % — SIGNIFICANT CHANGE UP (ref 2–14)
NEUTROPHILS # BLD AUTO: 1.33 K/UL — LOW (ref 1.8–7.4)
NEUTROPHILS # BLD AUTO: 11 K/UL — HIGH (ref 1.8–7.4)
NEUTROPHILS # BLD AUTO: 2.43 K/UL — SIGNIFICANT CHANGE UP (ref 1.8–7.4)
NEUTROPHILS # BLD AUTO: 3.66 K/UL — SIGNIFICANT CHANGE UP (ref 1.8–7.4)
NEUTROPHILS # BLD AUTO: 3.72 K/UL — SIGNIFICANT CHANGE UP (ref 1.8–7.4)
NEUTROPHILS # BLD AUTO: 3.81 K/UL — SIGNIFICANT CHANGE UP (ref 1.8–7.4)
NEUTROPHILS # BLD AUTO: 4.25 K/UL — SIGNIFICANT CHANGE UP (ref 1.8–7.4)
NEUTROPHILS # BLD AUTO: 4.41 K/UL — SIGNIFICANT CHANGE UP (ref 1.8–7.4)
NEUTROPHILS # BLD AUTO: 4.7 K/UL — SIGNIFICANT CHANGE UP (ref 1.8–7.4)
NEUTROPHILS # BLD AUTO: 8.7 K/UL — HIGH (ref 1.8–7.4)
NEUTROPHILS NFR BLD AUTO: 52.2 % — SIGNIFICANT CHANGE UP (ref 43–77)
NEUTROPHILS NFR BLD AUTO: 60.5 % — SIGNIFICANT CHANGE UP (ref 43–77)
NEUTROPHILS NFR BLD AUTO: 69.3 % — SIGNIFICANT CHANGE UP (ref 43–77)
NEUTROPHILS NFR BLD AUTO: 72.2 % — SIGNIFICANT CHANGE UP (ref 43–77)
NEUTROPHILS NFR BLD AUTO: 74.2 % — SIGNIFICANT CHANGE UP (ref 43–77)
NEUTROPHILS NFR BLD AUTO: 76.8 % — SIGNIFICANT CHANGE UP (ref 43–77)
NEUTROPHILS NFR BLD AUTO: 77 % — SIGNIFICANT CHANGE UP (ref 43–77)
NEUTROPHILS NFR BLD AUTO: 78.4 % — HIGH (ref 43–77)
NEUTROPHILS NFR BLD AUTO: 81.1 % — HIGH (ref 43–77)
NEUTROPHILS NFR BLD AUTO: 82.7 % — HIGH (ref 43–77)
NITRITE UR-MCNC: NEGATIVE — SIGNIFICANT CHANGE UP
NON HDL CHOLESTEROL: 95 MG/DL — SIGNIFICANT CHANGE UP
PH UR: 5 — SIGNIFICANT CHANGE UP (ref 5–8)
PH UR: 6 — SIGNIFICANT CHANGE UP (ref 5–8)
PH UR: 6.5 — SIGNIFICANT CHANGE UP (ref 5–8)
PHOSPHATE SERPL-MCNC: 2.1 MG/DL — LOW (ref 2.5–4.5)
PHOSPHATE SERPL-MCNC: 2.7 MG/DL — SIGNIFICANT CHANGE UP (ref 2.5–4.5)
PHOSPHATE SERPL-MCNC: 3 MG/DL — SIGNIFICANT CHANGE UP (ref 2.5–4.5)
PHOSPHATE SERPL-MCNC: 3.1 MG/DL — SIGNIFICANT CHANGE UP (ref 2.5–4.5)
PHOSPHATE SERPL-MCNC: 3.1 MG/DL — SIGNIFICANT CHANGE UP (ref 2.5–4.5)
PHOSPHATE SERPL-MCNC: 3.2 MG/DL — SIGNIFICANT CHANGE UP (ref 2.5–4.5)
PLATELET # BLD AUTO: 105 K/UL — LOW (ref 150–400)
PLATELET # BLD AUTO: 112 K/UL — LOW (ref 150–400)
PLATELET # BLD AUTO: 112 K/UL — LOW (ref 150–400)
PLATELET # BLD AUTO: 115 K/UL — LOW (ref 150–400)
PLATELET # BLD AUTO: 125 K/UL — LOW (ref 150–400)
PLATELET # BLD AUTO: 127 K/UL — LOW (ref 150–400)
PLATELET # BLD AUTO: 145 K/UL — LOW (ref 150–400)
PLATELET # BLD AUTO: 242 K/UL — SIGNIFICANT CHANGE UP (ref 150–400)
PLATELET # BLD AUTO: 245 K/UL — SIGNIFICANT CHANGE UP (ref 150–400)
PLATELET # BLD AUTO: 310 K/UL — SIGNIFICANT CHANGE UP (ref 150–400)
PLATELET # BLD AUTO: 350 K/UL — SIGNIFICANT CHANGE UP (ref 150–400)
PLATELET # BLD AUTO: 62 K/UL — LOW (ref 150–400)
PLATELET # BLD AUTO: 76 K/UL — LOW (ref 150–400)
PLATELET # BLD AUTO: 79 K/UL — LOW (ref 150–400)
PLATELET # BLD AUTO: 85 K/UL — LOW (ref 150–400)
PLATELET # BLD AUTO: 92 K/UL — LOW (ref 150–400)
PLATELET # BLD AUTO: 99 K/UL — LOW (ref 150–400)
POTASSIUM SERPL-MCNC: 3.3 MMOL/L — LOW (ref 3.5–5.3)
POTASSIUM SERPL-MCNC: 3.6 MMOL/L — SIGNIFICANT CHANGE UP (ref 3.5–5.3)
POTASSIUM SERPL-MCNC: 3.8 MMOL/L — SIGNIFICANT CHANGE UP (ref 3.5–5.3)
POTASSIUM SERPL-MCNC: 3.9 MMOL/L — SIGNIFICANT CHANGE UP (ref 3.5–5.3)
POTASSIUM SERPL-MCNC: 4.1 MMOL/L — SIGNIFICANT CHANGE UP (ref 3.5–5.3)
POTASSIUM SERPL-MCNC: 4.2 MMOL/L — SIGNIFICANT CHANGE UP (ref 3.5–5.3)
POTASSIUM SERPL-MCNC: 4.5 MMOL/L — SIGNIFICANT CHANGE UP (ref 3.5–5.3)
POTASSIUM SERPL-MCNC: 4.6 MMOL/L — SIGNIFICANT CHANGE UP (ref 3.5–5.3)
POTASSIUM SERPL-MCNC: 4.6 MMOL/L — SIGNIFICANT CHANGE UP (ref 3.5–5.3)
POTASSIUM SERPL-MCNC: 4.8 MMOL/L — SIGNIFICANT CHANGE UP (ref 3.5–5.3)
POTASSIUM SERPL-MCNC: 4.9 MMOL/L — SIGNIFICANT CHANGE UP (ref 3.5–5.3)
POTASSIUM SERPL-SCNC: 3.3 MMOL/L — LOW (ref 3.5–5.3)
POTASSIUM SERPL-SCNC: 3.6 MMOL/L — SIGNIFICANT CHANGE UP (ref 3.5–5.3)
POTASSIUM SERPL-SCNC: 3.8 MMOL/L — SIGNIFICANT CHANGE UP (ref 3.5–5.3)
POTASSIUM SERPL-SCNC: 3.9 MMOL/L — SIGNIFICANT CHANGE UP (ref 3.5–5.3)
POTASSIUM SERPL-SCNC: 4.1 MMOL/L — SIGNIFICANT CHANGE UP (ref 3.5–5.3)
POTASSIUM SERPL-SCNC: 4.2 MMOL/L — SIGNIFICANT CHANGE UP (ref 3.5–5.3)
POTASSIUM SERPL-SCNC: 4.5 MMOL/L — SIGNIFICANT CHANGE UP (ref 3.5–5.3)
POTASSIUM SERPL-SCNC: 4.6 MMOL/L — SIGNIFICANT CHANGE UP (ref 3.5–5.3)
POTASSIUM SERPL-SCNC: 4.6 MMOL/L — SIGNIFICANT CHANGE UP (ref 3.5–5.3)
POTASSIUM SERPL-SCNC: 4.8 MMOL/L — SIGNIFICANT CHANGE UP (ref 3.5–5.3)
POTASSIUM SERPL-SCNC: 4.9 MMOL/L — SIGNIFICANT CHANGE UP (ref 3.5–5.3)
PREALB SERPL-MCNC: 7 MG/DL — LOW (ref 20–40)
PROT SERPL-MCNC: 5.7 GM/DL — LOW (ref 6–8.3)
PROT SERPL-MCNC: 5.9 GM/DL — LOW (ref 6–8.3)
PROT SERPL-MCNC: 6 GM/DL — SIGNIFICANT CHANGE UP (ref 6–8.3)
PROT SERPL-MCNC: 6.1 GM/DL — SIGNIFICANT CHANGE UP (ref 6–8.3)
PROT SERPL-MCNC: 6.5 GM/DL — SIGNIFICANT CHANGE UP (ref 6–8.3)
PROT SERPL-MCNC: 7.1 GM/DL — SIGNIFICANT CHANGE UP (ref 6–8.3)
PROT SERPL-MCNC: 8.1 GM/DL — SIGNIFICANT CHANGE UP (ref 6–8.3)
PROT UR-MCNC: 15 MG/DL
PROT UR-MCNC: 30 MG/DL
PROT UR-MCNC: NEGATIVE MG/DL — SIGNIFICANT CHANGE UP
PROTHROM AB SERPL-ACNC: 12.6 SEC — SIGNIFICANT CHANGE UP (ref 10–12.9)
PROTHROM AB SERPL-ACNC: 12.9 SEC — SIGNIFICANT CHANGE UP (ref 10.6–13.6)
PROTHROM AB SERPL-ACNC: 13.1 SEC — HIGH (ref 10–12.9)
PROTHROM AB SERPL-ACNC: 13.2 SEC — SIGNIFICANT CHANGE UP (ref 10.6–13.6)
PROTHROM AB SERPL-ACNC: 14.3 SEC — HIGH (ref 10–12.9)
RBC # BLD: 2.6 M/UL — LOW (ref 3.8–5.2)
RBC # BLD: 2.62 M/UL — LOW (ref 3.8–5.2)
RBC # BLD: 2.63 M/UL — LOW (ref 3.8–5.2)
RBC # BLD: 2.79 M/UL — LOW (ref 3.8–5.2)
RBC # BLD: 2.89 M/UL — LOW (ref 3.8–5.2)
RBC # BLD: 2.92 M/UL — LOW (ref 3.8–5.2)
RBC # BLD: 2.95 M/UL — LOW (ref 3.8–5.2)
RBC # BLD: 3.01 M/UL — LOW (ref 3.8–5.2)
RBC # BLD: 3.03 M/UL — LOW (ref 3.8–5.2)
RBC # BLD: 3.05 M/UL — LOW (ref 3.8–5.2)
RBC # BLD: 3.09 M/UL — LOW (ref 3.8–5.2)
RBC # BLD: 3.3 M/UL — LOW (ref 3.8–5.2)
RBC # BLD: 3.31 M/UL — LOW (ref 3.8–5.2)
RBC # BLD: 3.68 M/UL — LOW (ref 3.8–5.2)
RBC # BLD: 3.73 M/UL — LOW (ref 3.8–5.2)
RBC # BLD: 4 M/UL — SIGNIFICANT CHANGE UP (ref 3.8–5.2)
RBC # BLD: 4.15 M/UL — SIGNIFICANT CHANGE UP (ref 3.8–5.2)
RBC # FLD: 12.4 % — SIGNIFICANT CHANGE UP (ref 10.3–14.5)
RBC # FLD: 12.5 % — SIGNIFICANT CHANGE UP (ref 10.3–14.5)
RBC # FLD: 12.6 % — SIGNIFICANT CHANGE UP (ref 10.3–14.5)
RBC # FLD: 12.7 % — SIGNIFICANT CHANGE UP (ref 10.3–14.5)
RBC # FLD: 14.6 % — HIGH (ref 10.3–14.5)
RBC # FLD: 14.6 % — HIGH (ref 10.3–14.5)
RBC # FLD: 14.7 % — HIGH (ref 10.3–14.5)
RBC # FLD: 14.7 % — HIGH (ref 10.3–14.5)
RBC # FLD: 14.8 % — HIGH (ref 10.3–14.5)
RBC # FLD: 14.8 % — HIGH (ref 10.3–14.5)
RBC # FLD: 14.9 % — HIGH (ref 10.3–14.5)
RBC # FLD: 14.9 % — HIGH (ref 10.3–14.5)
RBC # FLD: 15 % — HIGH (ref 10.3–14.5)
RBC # FLD: 15 % — HIGH (ref 10.3–14.5)
RBC # FLD: 15.1 % — HIGH (ref 10.3–14.5)
RBC # FLD: 15.1 % — HIGH (ref 10.3–14.5)
RBC # FLD: 15.4 % — HIGH (ref 10.3–14.5)
SARS-COV-2 IGG SERPL QL IA: NEGATIVE — SIGNIFICANT CHANGE UP
SARS-COV-2 IGM SERPL IA-ACNC: 0.09 INDEX — SIGNIFICANT CHANGE UP
SARS-COV-2 RNA SPEC QL NAA+PROBE: SIGNIFICANT CHANGE UP
SODIUM SERPL-SCNC: 135 MMOL/L — SIGNIFICANT CHANGE UP (ref 135–145)
SODIUM SERPL-SCNC: 136 MMOL/L — SIGNIFICANT CHANGE UP (ref 135–145)
SODIUM SERPL-SCNC: 137 MMOL/L — SIGNIFICANT CHANGE UP (ref 135–145)
SODIUM SERPL-SCNC: 138 MMOL/L — SIGNIFICANT CHANGE UP (ref 135–145)
SODIUM SERPL-SCNC: 139 MMOL/L — SIGNIFICANT CHANGE UP (ref 135–145)
SODIUM SERPL-SCNC: 140 MMOL/L — SIGNIFICANT CHANGE UP (ref 135–145)
SP GR SPEC: 1.01 — SIGNIFICANT CHANGE UP (ref 1.01–1.02)
SP GR SPEC: 1.01 — SIGNIFICANT CHANGE UP (ref 1.01–1.02)
SP GR SPEC: 1.02 — SIGNIFICANT CHANGE UP (ref 1.01–1.02)
SPECIMEN SOURCE: SIGNIFICANT CHANGE UP
SPECIMEN SOURCE: SIGNIFICANT CHANGE UP
TRIGL SERPL-MCNC: 150 MG/DL — HIGH (ref 10–149)
TRIGL SERPL-MCNC: 98 MG/DL — SIGNIFICANT CHANGE UP
TSH SERPL-MCNC: 3.69 UU/ML — SIGNIFICANT CHANGE UP (ref 0.34–4.82)
UROBILINOGEN FLD QL: 1 MG/DL
UROBILINOGEN FLD QL: NEGATIVE MG/DL — SIGNIFICANT CHANGE UP
UROBILINOGEN FLD QL: NEGATIVE MG/DL — SIGNIFICANT CHANGE UP
VIT B12 SERPL-MCNC: 785 PG/ML — SIGNIFICANT CHANGE UP (ref 232–1245)
WBC # BLD: 11.32 K/UL — HIGH (ref 3.8–10.5)
WBC # BLD: 13.28 K/UL — HIGH (ref 3.8–10.5)
WBC # BLD: 2.55 K/UL — LOW (ref 3.8–10.5)
WBC # BLD: 4.02 K/UL — SIGNIFICANT CHANGE UP (ref 3.8–10.5)
WBC # BLD: 4.13 K/UL — SIGNIFICANT CHANGE UP (ref 3.8–10.5)
WBC # BLD: 4.64 K/UL — SIGNIFICANT CHANGE UP (ref 3.8–10.5)
WBC # BLD: 4.74 K/UL — SIGNIFICANT CHANGE UP (ref 3.8–10.5)
WBC # BLD: 5.13 K/UL — SIGNIFICANT CHANGE UP (ref 3.8–10.5)
WBC # BLD: 5.19 K/UL — SIGNIFICANT CHANGE UP (ref 3.8–10.5)
WBC # BLD: 5.24 K/UL — SIGNIFICANT CHANGE UP (ref 3.8–10.5)
WBC # BLD: 5.28 K/UL — SIGNIFICANT CHANGE UP (ref 3.8–10.5)
WBC # BLD: 5.51 K/UL — SIGNIFICANT CHANGE UP (ref 3.8–10.5)
WBC # BLD: 5.6 K/UL — SIGNIFICANT CHANGE UP (ref 3.8–10.5)
WBC # BLD: 5.93 K/UL — SIGNIFICANT CHANGE UP (ref 3.8–10.5)
WBC # BLD: 6.1 K/UL — SIGNIFICANT CHANGE UP (ref 3.8–10.5)
WBC # BLD: 6.13 K/UL — SIGNIFICANT CHANGE UP (ref 3.8–10.5)
WBC # BLD: 6.28 K/UL — SIGNIFICANT CHANGE UP (ref 3.8–10.5)
WBC # FLD AUTO: 11.32 K/UL — HIGH (ref 3.8–10.5)
WBC # FLD AUTO: 13.28 K/UL — HIGH (ref 3.8–10.5)
WBC # FLD AUTO: 2.55 K/UL — LOW (ref 3.8–10.5)
WBC # FLD AUTO: 4.02 K/UL — SIGNIFICANT CHANGE UP (ref 3.8–10.5)
WBC # FLD AUTO: 4.13 K/UL — SIGNIFICANT CHANGE UP (ref 3.8–10.5)
WBC # FLD AUTO: 4.64 K/UL — SIGNIFICANT CHANGE UP (ref 3.8–10.5)
WBC # FLD AUTO: 4.74 K/UL — SIGNIFICANT CHANGE UP (ref 3.8–10.5)
WBC # FLD AUTO: 5.13 K/UL — SIGNIFICANT CHANGE UP (ref 3.8–10.5)
WBC # FLD AUTO: 5.19 K/UL — SIGNIFICANT CHANGE UP (ref 3.8–10.5)
WBC # FLD AUTO: 5.24 K/UL — SIGNIFICANT CHANGE UP (ref 3.8–10.5)
WBC # FLD AUTO: 5.28 K/UL — SIGNIFICANT CHANGE UP (ref 3.8–10.5)
WBC # FLD AUTO: 5.51 K/UL — SIGNIFICANT CHANGE UP (ref 3.8–10.5)
WBC # FLD AUTO: 5.6 K/UL — SIGNIFICANT CHANGE UP (ref 3.8–10.5)
WBC # FLD AUTO: 5.93 K/UL — SIGNIFICANT CHANGE UP (ref 3.8–10.5)
WBC # FLD AUTO: 6.1 K/UL — SIGNIFICANT CHANGE UP (ref 3.8–10.5)
WBC # FLD AUTO: 6.13 K/UL — SIGNIFICANT CHANGE UP (ref 3.8–10.5)
WBC # FLD AUTO: 6.28 K/UL — SIGNIFICANT CHANGE UP (ref 3.8–10.5)

## 2020-01-01 PROCEDURE — 97116 GAIT TRAINING THERAPY: CPT | Mod: GP

## 2020-01-01 PROCEDURE — 86850 RBC ANTIBODY SCREEN: CPT

## 2020-01-01 PROCEDURE — 99233 SBSQ HOSP IP/OBS HIGH 50: CPT

## 2020-01-01 PROCEDURE — 74019 RADEX ABDOMEN 2 VIEWS: CPT

## 2020-01-01 PROCEDURE — 93010 ELECTROCARDIOGRAM REPORT: CPT

## 2020-01-01 PROCEDURE — 74177 CT ABD & PELVIS W/CONTRAST: CPT | Mod: 26

## 2020-01-01 PROCEDURE — 74177 CT ABD & PELVIS W/CONTRAST: CPT

## 2020-01-01 PROCEDURE — 36415 COLL VENOUS BLD VENIPUNCTURE: CPT

## 2020-01-01 PROCEDURE — 74019 RADEX ABDOMEN 2 VIEWS: CPT | Mod: 26

## 2020-01-01 PROCEDURE — 99232 SBSQ HOSP IP/OBS MODERATE 35: CPT

## 2020-01-01 PROCEDURE — 96360 HYDRATION IV INFUSION INIT: CPT | Mod: XU

## 2020-01-01 PROCEDURE — 82607 VITAMIN B-12: CPT

## 2020-01-01 PROCEDURE — 82746 ASSAY OF FOLIC ACID SERUM: CPT

## 2020-01-01 PROCEDURE — 99239 HOSP IP/OBS DSCHRG MGMT >30: CPT

## 2020-01-01 PROCEDURE — 99498 ADVNCD CARE PLAN ADDL 30 MIN: CPT

## 2020-01-01 PROCEDURE — 99231 SBSQ HOSP IP/OBS SF/LOW 25: CPT

## 2020-01-01 PROCEDURE — A9579: CPT

## 2020-01-01 PROCEDURE — 85610 PROTHROMBIN TIME: CPT

## 2020-01-01 PROCEDURE — 99284 EMERGENCY DEPT VISIT MOD MDM: CPT

## 2020-01-01 PROCEDURE — 85025 COMPLETE CBC W/AUTO DIFF WBC: CPT

## 2020-01-01 PROCEDURE — 84134 ASSAY OF PREALBUMIN: CPT

## 2020-01-01 PROCEDURE — 71045 X-RAY EXAM CHEST 1 VIEW: CPT | Mod: 26

## 2020-01-01 PROCEDURE — 83735 ASSAY OF MAGNESIUM: CPT

## 2020-01-01 PROCEDURE — 99213 OFFICE O/P EST LOW 20 MIN: CPT

## 2020-01-01 PROCEDURE — 93000 ELECTROCARDIOGRAM COMPLETE: CPT | Mod: NC

## 2020-01-01 PROCEDURE — C9113: CPT

## 2020-01-01 PROCEDURE — 97161 PT EVAL LOW COMPLEX 20 MIN: CPT | Mod: GP

## 2020-01-01 PROCEDURE — 99202 OFFICE O/P NEW SF 15 MIN: CPT

## 2020-01-01 PROCEDURE — 85730 THROMBOPLASTIN TIME PARTIAL: CPT

## 2020-01-01 PROCEDURE — 84478 ASSAY OF TRIGLYCERIDES: CPT

## 2020-01-01 PROCEDURE — 80053 COMPREHEN METABOLIC PANEL: CPT

## 2020-01-01 PROCEDURE — 80061 LIPID PANEL: CPT

## 2020-01-01 PROCEDURE — 96361 HYDRATE IV INFUSION ADD-ON: CPT

## 2020-01-01 PROCEDURE — 84443 ASSAY THYROID STIM HORMONE: CPT

## 2020-01-01 PROCEDURE — 82962 GLUCOSE BLOOD TEST: CPT

## 2020-01-01 PROCEDURE — 76705 ECHO EXAM OF ABDOMEN: CPT | Mod: 26

## 2020-01-01 PROCEDURE — 93970 EXTREMITY STUDY: CPT | Mod: 26

## 2020-01-01 PROCEDURE — 99284 EMERGENCY DEPT VISIT MOD MDM: CPT | Mod: 25

## 2020-01-01 PROCEDURE — 74240 X-RAY XM UPR GI TRC 1CNTRST: CPT | Mod: 26

## 2020-01-01 PROCEDURE — 71045 X-RAY EXAM CHEST 1 VIEW: CPT

## 2020-01-01 PROCEDURE — 93005 ELECTROCARDIOGRAM TRACING: CPT

## 2020-01-01 PROCEDURE — 99222 1ST HOSP IP/OBS MODERATE 55: CPT

## 2020-01-01 PROCEDURE — 86901 BLOOD TYPING SEROLOGIC RH(D): CPT

## 2020-01-01 PROCEDURE — 70553 MRI BRAIN STEM W/O & W/DYE: CPT | Mod: 26

## 2020-01-01 PROCEDURE — 93306 TTE W/DOPPLER COMPLETE: CPT

## 2020-01-01 PROCEDURE — 99497 ADVNCD CARE PLAN 30 MIN: CPT | Mod: 25

## 2020-01-01 PROCEDURE — 81001 URINALYSIS AUTO W/SCOPE: CPT

## 2020-01-01 PROCEDURE — 99238 HOSP IP/OBS DSCHRG MGMT 30/<: CPT

## 2020-01-01 PROCEDURE — 74250 X-RAY XM SM INT 1CNTRST STD: CPT

## 2020-01-01 PROCEDURE — 86803 HEPATITIS C AB TEST: CPT

## 2020-01-01 PROCEDURE — 86900 BLOOD TYPING SEROLOGIC ABO: CPT

## 2020-01-01 PROCEDURE — 80048 BASIC METABOLIC PNL TOTAL CA: CPT

## 2020-01-01 PROCEDURE — 74240 X-RAY XM UPR GI TRC 1CNTRST: CPT

## 2020-01-01 PROCEDURE — 99223 1ST HOSP IP/OBS HIGH 75: CPT | Mod: AI

## 2020-01-01 PROCEDURE — 83690 ASSAY OF LIPASE: CPT

## 2020-01-01 PROCEDURE — 82248 BILIRUBIN DIRECT: CPT

## 2020-01-01 PROCEDURE — 87086 URINE CULTURE/COLONY COUNT: CPT

## 2020-01-01 PROCEDURE — 99223 1ST HOSP IP/OBS HIGH 75: CPT

## 2020-01-01 PROCEDURE — 93970 EXTREMITY STUDY: CPT

## 2020-01-01 PROCEDURE — 76705 ECHO EXAM OF ABDOMEN: CPT

## 2020-01-01 PROCEDURE — 85027 COMPLETE CBC AUTOMATED: CPT

## 2020-01-01 PROCEDURE — 96365 THER/PROPH/DIAG IV INF INIT: CPT | Mod: XU

## 2020-01-01 PROCEDURE — 86304 IMMUNOASSAY TUMOR CA 125: CPT

## 2020-01-01 PROCEDURE — 74250 X-RAY XM SM INT 1CNTRST STD: CPT | Mod: 26

## 2020-01-01 PROCEDURE — 71275 CT ANGIOGRAPHY CHEST: CPT | Mod: 26

## 2020-01-01 PROCEDURE — 76000 FLUOROSCOPY <1 HR PHYS/QHP: CPT

## 2020-01-01 PROCEDURE — 84100 ASSAY OF PHOSPHORUS: CPT

## 2020-01-01 PROCEDURE — 81003 URINALYSIS AUTO W/O SCOPE: CPT

## 2020-01-01 PROCEDURE — 82140 ASSAY OF AMMONIA: CPT

## 2020-01-01 PROCEDURE — C1788: CPT

## 2020-01-01 PROCEDURE — 71275 CT ANGIOGRAPHY CHEST: CPT

## 2020-01-01 PROCEDURE — 70553 MRI BRAIN STEM W/O & W/DYE: CPT

## 2020-01-01 PROCEDURE — 82306 VITAMIN D 25 HYDROXY: CPT

## 2020-01-01 PROCEDURE — 99205 OFFICE O/P NEW HI 60 MIN: CPT

## 2020-01-01 RX ORDER — FAMOTIDINE 10 MG/ML
2 INJECTION INTRAVENOUS
Qty: 0 | Refills: 0 | DISCHARGE
Start: 2020-01-01

## 2020-01-01 RX ORDER — FENTANYL CITRATE 50 UG/ML
1 INJECTION INTRAVENOUS
Refills: 0 | Status: DISCONTINUED | OUTPATIENT
Start: 2020-01-01 | End: 2020-01-01

## 2020-01-01 RX ORDER — POLYETHYLENE GLYCOL 3350 17 G/17G
17 POWDER, FOR SOLUTION ORAL
Refills: 0 | Status: DISCONTINUED | OUTPATIENT
Start: 2020-01-01 | End: 2020-01-01

## 2020-01-01 RX ORDER — PROCHLORPERAZINE MALEATE 5 MG
10 TABLET ORAL ONCE
Refills: 0 | Status: COMPLETED | OUTPATIENT
Start: 2020-01-01 | End: 2020-01-01

## 2020-01-01 RX ORDER — SENNA PLUS 8.6 MG/1
2 TABLET ORAL
Qty: 0 | Refills: 0 | DISCHARGE

## 2020-01-01 RX ORDER — MORPHINE SULFATE 50 MG/1
4 CAPSULE, EXTENDED RELEASE ORAL EVERY 4 HOURS
Refills: 0 | Status: DISCONTINUED | OUTPATIENT
Start: 2020-01-01 | End: 2020-01-01

## 2020-01-01 RX ORDER — LANOLIN ALCOHOL/MO/W.PET/CERES
5 CREAM (GRAM) TOPICAL AT BEDTIME
Refills: 0 | Status: DISCONTINUED | OUTPATIENT
Start: 2020-01-01 | End: 2020-01-01

## 2020-01-01 RX ORDER — ACETAMINOPHEN 500 MG
1000 TABLET ORAL ONCE
Refills: 0 | Status: COMPLETED | OUTPATIENT
Start: 2020-01-01 | End: 2020-01-01

## 2020-01-01 RX ORDER — SODIUM CHLORIDE 9 MG/ML
1000 INJECTION, SOLUTION INTRAVENOUS
Refills: 0 | Status: COMPLETED | OUTPATIENT
Start: 2020-01-01 | End: 2020-01-01

## 2020-01-01 RX ORDER — POLYETHYLENE GLYCOL 3350 17 G/17G
17 POWDER, FOR SOLUTION ORAL ONCE
Refills: 0 | Status: COMPLETED | OUTPATIENT
Start: 2020-01-01 | End: 2020-01-01

## 2020-01-01 RX ORDER — PANTOPRAZOLE SODIUM 20 MG/1
40 TABLET, DELAYED RELEASE ORAL
Refills: 0 | Status: DISCONTINUED | OUTPATIENT
Start: 2020-01-01 | End: 2020-01-01

## 2020-01-01 RX ORDER — OCTREOTIDE ACETATE 200 UG/ML
150 INJECTION, SOLUTION INTRAVENOUS; SUBCUTANEOUS THREE TIMES A DAY
Refills: 0 | Status: COMPLETED | OUTPATIENT
Start: 2020-01-01 | End: 2020-01-01

## 2020-01-01 RX ORDER — HYDROMORPHONE HYDROCHLORIDE 2 MG/ML
2 INJECTION INTRAMUSCULAR; INTRAVENOUS; SUBCUTANEOUS EVERY 4 HOURS
Refills: 0 | Status: DISCONTINUED | OUTPATIENT
Start: 2020-01-01 | End: 2020-01-01

## 2020-01-01 RX ORDER — CARBOPLATIN 50 MG
653 VIAL (EA) INTRAVENOUS ONCE
Refills: 0 | Status: COMPLETED | OUTPATIENT
Start: 2020-01-01 | End: 2020-01-01

## 2020-01-01 RX ORDER — SIMETHICONE 80 MG/1
80 TABLET, CHEWABLE ORAL ONCE
Refills: 0 | Status: COMPLETED | OUTPATIENT
Start: 2020-01-01 | End: 2020-01-01

## 2020-01-01 RX ORDER — FENTANYL CITRATE 50 UG/ML
1 INJECTION INTRAVENOUS
Qty: 0 | Refills: 0 | DISCHARGE

## 2020-01-01 RX ORDER — PANTOPRAZOLE SODIUM 20 MG/1
20 TABLET, DELAYED RELEASE ORAL ONCE
Refills: 0 | Status: COMPLETED | OUTPATIENT
Start: 2020-01-01 | End: 2020-01-01

## 2020-01-01 RX ORDER — POLYETHYLENE GLYCOL 3350 17 G/17G
17 POWDER, FOR SOLUTION ORAL
Qty: 0 | Refills: 0 | DISCHARGE

## 2020-01-01 RX ORDER — SODIUM CHLORIDE 9 MG/ML
1000 INJECTION INTRAMUSCULAR; INTRAVENOUS; SUBCUTANEOUS ONCE
Refills: 0 | Status: COMPLETED | OUTPATIENT
Start: 2020-01-01 | End: 2020-01-01

## 2020-01-01 RX ORDER — PIPERACILLIN AND TAZOBACTAM 4; .5 G/20ML; G/20ML
3.38 INJECTION, POWDER, LYOPHILIZED, FOR SOLUTION INTRAVENOUS ONCE
Refills: 0 | Status: COMPLETED | OUTPATIENT
Start: 2020-01-01 | End: 2020-01-01

## 2020-01-01 RX ORDER — APREPITANT 80 MG/1
80 CAPSULE ORAL DAILY
Refills: 0 | Status: COMPLETED | OUTPATIENT
Start: 2020-01-01 | End: 2020-01-01

## 2020-01-01 RX ORDER — ONDANSETRON 8 MG/1
4 TABLET, FILM COATED ORAL ONCE
Refills: 0 | Status: COMPLETED | OUTPATIENT
Start: 2020-01-01 | End: 2020-01-01

## 2020-01-01 RX ORDER — DIPHENHYDRAMINE HCL 50 MG
25 CAPSULE ORAL ONCE
Refills: 0 | Status: COMPLETED | OUTPATIENT
Start: 2020-01-01 | End: 2020-01-01

## 2020-01-01 RX ORDER — ELECTROLYTE SOLUTION,INJ
1 VIAL (ML) INTRAVENOUS
Refills: 0 | Status: DISCONTINUED | OUTPATIENT
Start: 2020-01-01 | End: 2020-01-01

## 2020-01-01 RX ORDER — ENOXAPARIN SODIUM 100 MG/ML
1 INJECTION SUBCUTANEOUS
Qty: 0 | Refills: 0 | DISCHARGE

## 2020-01-01 RX ORDER — LANOLIN ALCOHOL/MO/W.PET/CERES
3 CREAM (GRAM) TOPICAL AT BEDTIME
Refills: 0 | Status: DISCONTINUED | OUTPATIENT
Start: 2020-01-01 | End: 2020-01-01

## 2020-01-01 RX ORDER — ONDANSETRON 8 MG/1
4 TABLET, FILM COATED ORAL EVERY 6 HOURS
Refills: 0 | Status: COMPLETED | OUTPATIENT
Start: 2020-01-01 | End: 2020-01-01

## 2020-01-01 RX ORDER — FAMOTIDINE 10 MG/ML
20 INJECTION INTRAVENOUS EVERY 12 HOURS
Refills: 0 | Status: DISCONTINUED | OUTPATIENT
Start: 2020-01-01 | End: 2020-01-01

## 2020-01-01 RX ORDER — DIPHENHYDRAMINE HYDROCHLORIDE AND LIDOCAINE HYDROCHLORIDE AND ALUMINUM HYDROXIDE AND MAGNESIUM HYDRO
10 KIT
Refills: 0 | Status: DISCONTINUED | OUTPATIENT
Start: 2020-01-01 | End: 2020-01-01

## 2020-01-01 RX ORDER — HEPARIN SODIUM 5000 [USP'U]/ML
5000 INJECTION INTRAVENOUS; SUBCUTANEOUS EVERY 8 HOURS
Refills: 0 | Status: DISCONTINUED | OUTPATIENT
Start: 2020-01-01 | End: 2020-01-01

## 2020-01-01 RX ORDER — MIRTAZAPINE 45 MG/1
1 TABLET, ORALLY DISINTEGRATING ORAL
Qty: 0 | Refills: 0 | DISCHARGE
Start: 2020-01-01

## 2020-01-01 RX ORDER — OCTREOTIDE ACETATE 200 UG/ML
100 INJECTION, SOLUTION INTRAVENOUS; SUBCUTANEOUS EVERY 8 HOURS
Refills: 0 | Status: DISCONTINUED | OUTPATIENT
Start: 2020-01-01 | End: 2020-01-01

## 2020-01-01 RX ORDER — DIPHENHYDRAMINE HCL 50 MG
25 CAPSULE ORAL EVERY 6 HOURS
Refills: 0 | Status: DISCONTINUED | OUTPATIENT
Start: 2020-01-01 | End: 2020-01-01

## 2020-01-01 RX ORDER — ENOXAPARIN SODIUM 100 MG/ML
40 INJECTION SUBCUTANEOUS DAILY
Refills: 0 | Status: DISCONTINUED | OUTPATIENT
Start: 2020-01-01 | End: 2020-01-01

## 2020-01-01 RX ORDER — ZOLPIDEM TARTRATE 10 MG/1
5 TABLET ORAL ONCE
Refills: 0 | Status: DISCONTINUED | OUTPATIENT
Start: 2020-01-01 | End: 2020-01-01

## 2020-01-01 RX ORDER — POLYETHYLENE GLYCOL 3350 17 G/17G
1 POWDER, FOR SOLUTION ORAL
Qty: 0 | Refills: 0 | DISCHARGE

## 2020-01-01 RX ORDER — MORPHINE SULFATE 50 MG/1
1 CAPSULE, EXTENDED RELEASE ORAL EVERY 4 HOURS
Refills: 0 | Status: DISCONTINUED | OUTPATIENT
Start: 2020-01-01 | End: 2020-01-01

## 2020-01-01 RX ORDER — SIMETHICONE 80 MG/1
80 TABLET, CHEWABLE ORAL EVERY 8 HOURS
Refills: 0 | Status: DISCONTINUED | OUTPATIENT
Start: 2020-01-01 | End: 2020-01-01

## 2020-01-01 RX ORDER — DIPHENHYDRAMINE HYDROCHLORIDE AND LIDOCAINE HYDROCHLORIDE AND ALUMINUM HYDROXIDE AND MAGNESIUM HYDRO
10 KIT
Qty: 0 | Refills: 0 | DISCHARGE
Start: 2020-01-01

## 2020-01-01 RX ORDER — LINACLOTIDE 145 UG/1
145 CAPSULE, GELATIN COATED ORAL
Qty: 30 | Refills: 4 | Status: ACTIVE | COMMUNITY
Start: 2020-01-01 | End: 1900-01-01

## 2020-01-01 RX ORDER — ENOXAPARIN SODIUM 100 MG/ML
90 INJECTION SUBCUTANEOUS DAILY
Refills: 0 | Status: DISCONTINUED | OUTPATIENT
Start: 2020-01-01 | End: 2020-01-01

## 2020-01-01 RX ORDER — ERGOCALCIFEROL 1.25 MG/1
1 CAPSULE ORAL
Qty: 0 | Refills: 0 | DISCHARGE
Start: 2020-01-01

## 2020-01-01 RX ORDER — ENOXAPARIN SODIUM 100 MG/ML
60 INJECTION SUBCUTANEOUS DAILY
Refills: 0 | Status: DISCONTINUED | OUTPATIENT
Start: 2020-01-01 | End: 2020-01-01

## 2020-01-01 RX ORDER — MORPHINE SULFATE 50 MG/1
4 CAPSULE, EXTENDED RELEASE ORAL ONCE
Refills: 0 | Status: DISCONTINUED | OUTPATIENT
Start: 2020-01-01 | End: 2020-01-01

## 2020-01-01 RX ORDER — ONDANSETRON 8 MG/1
4 TABLET, FILM COATED ORAL EVERY 6 HOURS
Refills: 0 | Status: DISCONTINUED | OUTPATIENT
Start: 2020-01-01 | End: 2020-01-01

## 2020-01-01 RX ORDER — ZOLPIDEM TARTRATE 10 MG/1
5 TABLET ORAL AT BEDTIME
Refills: 0 | Status: DISCONTINUED | OUTPATIENT
Start: 2020-01-01 | End: 2020-01-01

## 2020-01-01 RX ORDER — OXYCODONE HYDROCHLORIDE 5 MG/1
10 TABLET ORAL ONCE
Refills: 0 | Status: DISCONTINUED | OUTPATIENT
Start: 2020-01-01 | End: 2020-01-01

## 2020-01-01 RX ORDER — SODIUM CHLORIDE 9 MG/ML
1000 INJECTION INTRAMUSCULAR; INTRAVENOUS; SUBCUTANEOUS
Refills: 0 | Status: DISCONTINUED | OUTPATIENT
Start: 2020-01-01 | End: 2020-01-01

## 2020-01-01 RX ORDER — ONDANSETRON 8 MG/1
12 TABLET, FILM COATED ORAL ONCE
Refills: 0 | Status: COMPLETED | OUTPATIENT
Start: 2020-01-01 | End: 2020-01-01

## 2020-01-01 RX ORDER — OCTREOTIDE ACETATE 200 UG/ML
0.04 INJECTION, SOLUTION INTRAVENOUS; SUBCUTANEOUS
Qty: 0 | Refills: 0 | DISCHARGE
Start: 2020-01-01

## 2020-01-01 RX ORDER — POTASSIUM CHLORIDE 20 MEQ
20 PACKET (EA) ORAL
Refills: 0 | Status: DISCONTINUED | OUTPATIENT
Start: 2020-01-01 | End: 2020-01-01

## 2020-01-01 RX ORDER — METOCLOPRAMIDE HCL 10 MG
10 TABLET ORAL THREE TIMES A DAY
Refills: 0 | Status: DISCONTINUED | OUTPATIENT
Start: 2020-01-01 | End: 2020-01-01

## 2020-01-01 RX ORDER — PANTOPRAZOLE SODIUM 20 MG/1
40 TABLET, DELAYED RELEASE ORAL DAILY
Refills: 0 | Status: DISCONTINUED | OUTPATIENT
Start: 2020-01-01 | End: 2020-01-01

## 2020-01-01 RX ORDER — ONDANSETRON 8 MG/1
4 TABLET, FILM COATED ORAL ONCE
Refills: 0 | Status: DISCONTINUED | OUTPATIENT
Start: 2020-01-01 | End: 2020-01-01

## 2020-01-01 RX ORDER — SODIUM CHLORIDE 9 MG/ML
1000 INJECTION, SOLUTION INTRAVENOUS
Refills: 0 | Status: DISCONTINUED | OUTPATIENT
Start: 2020-01-01 | End: 2020-01-01

## 2020-01-01 RX ORDER — POLYETHYLENE GLYCOL 3350 17 G/17G
17 POWDER, FOR SOLUTION ORAL DAILY
Refills: 0 | Status: DISCONTINUED | OUTPATIENT
Start: 2020-01-01 | End: 2020-01-01

## 2020-01-01 RX ORDER — POTASSIUM CHLORIDE 20 MEQ
20 PACKET (EA) ORAL EVERY 4 HOURS
Refills: 0 | Status: COMPLETED | OUTPATIENT
Start: 2020-01-01 | End: 2020-01-01

## 2020-01-01 RX ORDER — FAMOTIDINE 10 MG/ML
20 INJECTION INTRAVENOUS AT BEDTIME
Refills: 0 | Status: DISCONTINUED | OUTPATIENT
Start: 2020-01-01 | End: 2020-01-01

## 2020-01-01 RX ORDER — PANTOPRAZOLE SODIUM 20 MG/1
40 TABLET, DELAYED RELEASE ORAL
Qty: 0 | Refills: 0 | DISCHARGE
Start: 2020-01-01

## 2020-01-01 RX ORDER — PACLITAXEL 6 MG/ML
348 INJECTION, SOLUTION, CONCENTRATE INTRAVENOUS ONCE
Refills: 0 | Status: COMPLETED | OUTPATIENT
Start: 2020-01-01 | End: 2020-01-01

## 2020-01-01 RX ORDER — ONDANSETRON 8 MG/1
1 TABLET, FILM COATED ORAL
Qty: 30 | Refills: 0
Start: 2020-01-01 | End: 2020-01-01

## 2020-01-01 RX ORDER — ACETAMINOPHEN 500 MG
650 TABLET ORAL EVERY 4 HOURS
Refills: 0 | Status: DISCONTINUED | OUTPATIENT
Start: 2020-01-01 | End: 2020-01-01

## 2020-01-01 RX ORDER — SENNA PLUS 8.6 MG/1
2 TABLET ORAL AT BEDTIME
Refills: 0 | Status: DISCONTINUED | OUTPATIENT
Start: 2020-01-01 | End: 2020-01-01

## 2020-01-01 RX ORDER — APREPITANT 80 MG/1
125 CAPSULE ORAL ONCE
Refills: 0 | Status: COMPLETED | OUTPATIENT
Start: 2020-01-01 | End: 2020-01-01

## 2020-01-01 RX ORDER — SODIUM CHLORIDE 9 MG/ML
3 INJECTION INTRAMUSCULAR; INTRAVENOUS; SUBCUTANEOUS ONCE
Refills: 0 | Status: COMPLETED | OUTPATIENT
Start: 2020-01-01 | End: 2020-01-01

## 2020-01-01 RX ORDER — MORPHINE SULFATE 50 MG/1
2 CAPSULE, EXTENDED RELEASE ORAL EVERY 4 HOURS
Refills: 0 | Status: DISCONTINUED | OUTPATIENT
Start: 2020-01-01 | End: 2020-01-01

## 2020-01-01 RX ORDER — TRAMADOL HYDROCHLORIDE 50 MG/1
1 TABLET ORAL
Qty: 0 | Refills: 0 | DISCHARGE

## 2020-01-01 RX ORDER — FENTANYL CITRATE 50 UG/ML
1 INJECTION INTRAVENOUS
Qty: 10 | Refills: 0
Start: 2020-01-01 | End: 2020-01-01

## 2020-01-01 RX ORDER — OMEPRAZOLE 10 MG/1
1 CAPSULE, DELAYED RELEASE ORAL
Qty: 0 | Refills: 0 | DISCHARGE

## 2020-01-01 RX ORDER — DEXAMETHASONE 0.5 MG/5ML
12 ELIXIR ORAL ONCE
Refills: 0 | Status: COMPLETED | OUTPATIENT
Start: 2020-01-01 | End: 2020-01-01

## 2020-01-01 RX ORDER — ENOXAPARIN SODIUM 100 MG/ML
40 INJECTION SUBCUTANEOUS
Qty: 0 | Refills: 0 | DISCHARGE
Start: 2020-01-01

## 2020-01-01 RX ORDER — HYDROMORPHONE HYDROCHLORIDE 2 MG/ML
1 INJECTION INTRAMUSCULAR; INTRAVENOUS; SUBCUTANEOUS
Qty: 42 | Refills: 0
Start: 2020-01-01 | End: 2020-01-01

## 2020-01-01 RX ORDER — HYDROMORPHONE HYDROCHLORIDE 2 MG/ML
1 INJECTION INTRAMUSCULAR; INTRAVENOUS; SUBCUTANEOUS ONCE
Refills: 0 | Status: DISCONTINUED | OUTPATIENT
Start: 2020-01-01 | End: 2020-01-01

## 2020-01-01 RX ORDER — HYDROMORPHONE HYDROCHLORIDE 2 MG/ML
0.5 INJECTION INTRAMUSCULAR; INTRAVENOUS; SUBCUTANEOUS ONCE
Refills: 0 | Status: DISCONTINUED | OUTPATIENT
Start: 2020-01-01 | End: 2020-01-01

## 2020-01-01 RX ORDER — LANOLIN ALCOHOL/MO/W.PET/CERES
1 CREAM (GRAM) TOPICAL
Qty: 0 | Refills: 0 | DISCHARGE
Start: 2020-01-01

## 2020-01-01 RX ORDER — MULTIVIT WITH MIN/MFOLATE/K2 340-15/3 G
1 POWDER (GRAM) ORAL ONCE
Refills: 0 | Status: COMPLETED | OUTPATIENT
Start: 2020-01-01 | End: 2020-01-01

## 2020-01-01 RX ORDER — FAMOTIDINE 10 MG/ML
20 INJECTION INTRAVENOUS DAILY
Refills: 0 | Status: DISCONTINUED | OUTPATIENT
Start: 2020-01-01 | End: 2020-01-01

## 2020-01-01 RX ORDER — ERGOCALCIFEROL 1.25 MG/1
50000 CAPSULE ORAL
Refills: 0 | Status: DISCONTINUED | OUTPATIENT
Start: 2020-01-01 | End: 2020-01-01

## 2020-01-01 RX ORDER — SIMETHICONE 80 MG/1
80 TABLET, CHEWABLE ORAL THREE TIMES A DAY
Refills: 0 | Status: DISCONTINUED | OUTPATIENT
Start: 2020-01-01 | End: 2020-01-01

## 2020-01-01 RX ORDER — DEXAMETHASONE 0.5 MG/5ML
12 ELIXIR ORAL DAILY
Refills: 0 | Status: COMPLETED | OUTPATIENT
Start: 2020-01-01 | End: 2020-01-01

## 2020-01-01 RX ORDER — DOXORUBICIN HYDROCHLORIDE 2 MG/ML
93 INJECTABLE, LIPOSOMAL INTRAVENOUS ONCE
Refills: 0 | Status: COMPLETED | OUTPATIENT
Start: 2020-01-01 | End: 2020-01-01

## 2020-01-01 RX ORDER — ACETAMINOPHEN 500 MG
650 TABLET ORAL EVERY 6 HOURS
Refills: 0 | Status: DISCONTINUED | OUTPATIENT
Start: 2020-01-01 | End: 2020-01-01

## 2020-01-01 RX ORDER — LANOLIN ALCOHOL/MO/W.PET/CERES
5 CREAM (GRAM) TOPICAL ONCE
Refills: 0 | Status: COMPLETED | OUTPATIENT
Start: 2020-01-01 | End: 2020-01-01

## 2020-01-01 RX ORDER — SIMETHICONE 80 MG/1
1 TABLET, CHEWABLE ORAL
Qty: 0 | Refills: 0 | DISCHARGE
Start: 2020-01-01

## 2020-01-01 RX ORDER — MULTIVIT-MIN/FERROUS GLUCONATE 9 MG/15 ML
1 LIQUID (ML) ORAL
Qty: 0 | Refills: 0 | DISCHARGE
Start: 2020-01-01

## 2020-01-01 RX ORDER — PIPERACILLIN AND TAZOBACTAM 4; .5 G/20ML; G/20ML
3.38 INJECTION, POWDER, LYOPHILIZED, FOR SOLUTION INTRAVENOUS EVERY 8 HOURS
Refills: 0 | Status: DISCONTINUED | OUTPATIENT
Start: 2020-01-01 | End: 2020-01-01

## 2020-01-01 RX ORDER — MIRTAZAPINE 45 MG/1
7.5 TABLET, ORALLY DISINTEGRATING ORAL AT BEDTIME
Refills: 0 | Status: DISCONTINUED | OUTPATIENT
Start: 2020-01-01 | End: 2020-01-01

## 2020-01-01 RX ORDER — METOCLOPRAMIDE HCL 10 MG
10 TABLET ORAL
Qty: 0 | Refills: 0 | DISCHARGE
Start: 2020-01-01

## 2020-01-01 RX ORDER — NYSTATIN 500MM UNIT
500000 POWDER (EA) MISCELLANEOUS
Refills: 0 | Status: DISCONTINUED | OUTPATIENT
Start: 2020-01-01 | End: 2020-01-01

## 2020-01-01 RX ORDER — FENTANYL CITRATE 50 UG/ML
25 INJECTION INTRAVENOUS
Refills: 0 | Status: DISCONTINUED | OUTPATIENT
Start: 2020-01-01 | End: 2020-01-01

## 2020-01-01 RX ORDER — PANTOPRAZOLE SODIUM 20 MG/1
40 TABLET, DELAYED RELEASE ORAL ONCE
Refills: 0 | Status: COMPLETED | OUTPATIENT
Start: 2020-01-01 | End: 2020-01-01

## 2020-01-01 RX ORDER — MULTIVIT-MIN/FERROUS GLUCONATE 9 MG/15 ML
1 LIQUID (ML) ORAL DAILY
Refills: 0 | Status: DISCONTINUED | OUTPATIENT
Start: 2020-01-01 | End: 2020-01-01

## 2020-01-01 RX ORDER — FENTANYL CITRATE 50 UG/ML
1 INJECTION INTRAVENOUS
Qty: 0 | Refills: 0 | DISCHARGE
Start: 2020-01-01

## 2020-01-01 RX ORDER — NYSTATIN 500MM UNIT
500000 POWDER (EA) MISCELLANEOUS EVERY 8 HOURS
Refills: 0 | Status: DISCONTINUED | OUTPATIENT
Start: 2020-01-01 | End: 2020-01-01

## 2020-01-01 RX ORDER — FAMOTIDINE 10 MG/ML
40 INJECTION INTRAVENOUS ONCE
Refills: 0 | Status: COMPLETED | OUTPATIENT
Start: 2020-01-01 | End: 2020-01-01

## 2020-01-01 RX ORDER — DOCUSATE SODIUM 100 MG
1 CAPSULE ORAL
Qty: 0 | Refills: 0 | DISCHARGE

## 2020-01-01 RX ORDER — ACETAMINOPHEN 500 MG
650 TABLET ORAL ONCE
Refills: 0 | Status: COMPLETED | OUTPATIENT
Start: 2020-01-01 | End: 2020-01-01

## 2020-01-01 RX ORDER — I.V. FAT EMULSION 20 G/100ML
0.61 EMULSION INTRAVENOUS
Qty: 50 | Refills: 0 | Status: DISCONTINUED | OUTPATIENT
Start: 2020-01-01 | End: 2020-01-01

## 2020-01-01 RX ORDER — IBUPROFEN 200 MG
400 TABLET ORAL EVERY 8 HOURS
Refills: 0 | Status: DISCONTINUED | OUTPATIENT
Start: 2020-01-01 | End: 2020-01-01

## 2020-01-01 RX ORDER — ONDANSETRON 8 MG/1
4 TABLET, FILM COATED ORAL EVERY 8 HOURS
Refills: 0 | Status: DISCONTINUED | OUTPATIENT
Start: 2020-01-01 | End: 2020-01-01

## 2020-01-01 RX ORDER — MORPHINE SULFATE 50 MG/1
1 CAPSULE, EXTENDED RELEASE ORAL
Qty: 0 | Refills: 0 | DISCHARGE
Start: 2020-01-01

## 2020-01-01 RX ORDER — PANTOPRAZOLE SODIUM 20 MG/1
20 TABLET, DELAYED RELEASE ORAL ONCE
Refills: 0 | Status: DISCONTINUED | OUTPATIENT
Start: 2020-01-01 | End: 2020-01-01

## 2020-01-01 RX ORDER — SODIUM CHLORIDE 0.65 %
1 AEROSOL, SPRAY (ML) NASAL EVERY 6 HOURS
Refills: 0 | Status: DISCONTINUED | OUTPATIENT
Start: 2020-01-01 | End: 2020-01-01

## 2020-01-01 RX ORDER — INFLUENZA VIRUS VACCINE 15; 15; 15; 15 UG/.5ML; UG/.5ML; UG/.5ML; UG/.5ML
0.5 SUSPENSION INTRAMUSCULAR ONCE
Refills: 0 | Status: DISCONTINUED | OUTPATIENT
Start: 2020-01-01 | End: 2020-01-01

## 2020-01-01 RX ORDER — DEXTROSE MONOHYDRATE, SODIUM CHLORIDE, AND POTASSIUM CHLORIDE 50; .745; 4.5 G/1000ML; G/1000ML; G/1000ML
1000 INJECTION, SOLUTION INTRAVENOUS
Refills: 0 | Status: COMPLETED | OUTPATIENT
Start: 2020-01-01 | End: 2020-01-01

## 2020-01-01 RX ADMIN — DIPHENHYDRAMINE HYDROCHLORIDE AND LIDOCAINE HYDROCHLORIDE AND ALUMINUM HYDROXIDE AND MAGNESIUM HYDRO 10 MILLILITER(S): KIT at 21:04

## 2020-01-01 RX ADMIN — POLYETHYLENE GLYCOL 3350 17 GRAM(S): 17 POWDER, FOR SOLUTION ORAL at 10:04

## 2020-01-01 RX ADMIN — FENTANYL CITRATE 1 PATCH: 50 INJECTION INTRAVENOUS at 08:02

## 2020-01-01 RX ADMIN — MORPHINE SULFATE 4 MILLIGRAM(S): 50 CAPSULE, EXTENDED RELEASE ORAL at 07:50

## 2020-01-01 RX ADMIN — FENTANYL CITRATE 1 PATCH: 50 INJECTION INTRAVENOUS at 01:43

## 2020-01-01 RX ADMIN — OCTREOTIDE ACETATE 50 MICROGRAM(S): 200 INJECTION, SOLUTION INTRAVENOUS; SUBCUTANEOUS at 15:37

## 2020-01-01 RX ADMIN — FAMOTIDINE 20 MILLIGRAM(S): 10 INJECTION INTRAVENOUS at 18:16

## 2020-01-01 RX ADMIN — Medication 25 MILLIGRAM(S): at 12:48

## 2020-01-01 RX ADMIN — FENTANYL CITRATE 1 PATCH: 50 INJECTION INTRAVENOUS at 23:55

## 2020-01-01 RX ADMIN — FENTANYL CITRATE 1 PATCH: 50 INJECTION INTRAVENOUS at 20:06

## 2020-01-01 RX ADMIN — FENTANYL CITRATE 1 PATCH: 50 INJECTION INTRAVENOUS at 00:59

## 2020-01-01 RX ADMIN — FENTANYL CITRATE 1 PATCH: 50 INJECTION INTRAVENOUS at 20:29

## 2020-01-01 RX ADMIN — Medication 400 MILLIGRAM(S): at 06:30

## 2020-01-01 RX ADMIN — FENTANYL CITRATE 1 PATCH: 50 INJECTION INTRAVENOUS at 20:31

## 2020-01-01 RX ADMIN — Medication 1 MILLIGRAM(S): at 18:14

## 2020-01-01 RX ADMIN — HEPARIN SODIUM 5000 UNIT(S): 5000 INJECTION INTRAVENOUS; SUBCUTANEOUS at 23:02

## 2020-01-01 RX ADMIN — FENTANYL CITRATE 1 PATCH: 50 INJECTION INTRAVENOUS at 10:18

## 2020-01-01 RX ADMIN — Medication 1 TABLET(S): at 10:01

## 2020-01-01 RX ADMIN — ONDANSETRON 4 MILLIGRAM(S): 8 TABLET, FILM COATED ORAL at 23:02

## 2020-01-01 RX ADMIN — Medication 30 MILLILITER(S): at 22:33

## 2020-01-01 RX ADMIN — PIPERACILLIN AND TAZOBACTAM 200 GRAM(S): 4; .5 INJECTION, POWDER, LYOPHILIZED, FOR SOLUTION INTRAVENOUS at 21:00

## 2020-01-01 RX ADMIN — OCTREOTIDE ACETATE 150 MICROGRAM(S): 200 INJECTION, SOLUTION INTRAVENOUS; SUBCUTANEOUS at 22:20

## 2020-01-01 RX ADMIN — HEPARIN SODIUM 5000 UNIT(S): 5000 INJECTION INTRAVENOUS; SUBCUTANEOUS at 22:38

## 2020-01-01 RX ADMIN — PANTOPRAZOLE SODIUM 40 MILLIGRAM(S): 20 TABLET, DELAYED RELEASE ORAL at 09:43

## 2020-01-01 RX ADMIN — DIPHENHYDRAMINE HYDROCHLORIDE AND LIDOCAINE HYDROCHLORIDE AND ALUMINUM HYDROXIDE AND MAGNESIUM HYDRO 10 MILLILITER(S): KIT at 09:54

## 2020-01-01 RX ADMIN — ENOXAPARIN SODIUM 40 MILLIGRAM(S): 100 INJECTION SUBCUTANEOUS at 10:04

## 2020-01-01 RX ADMIN — Medication 1000 MILLIGRAM(S): at 11:48

## 2020-01-01 RX ADMIN — Medication 30 MILLILITER(S): at 16:09

## 2020-01-01 RX ADMIN — SODIUM CHLORIDE 75 MILLILITER(S): 9 INJECTION INTRAMUSCULAR; INTRAVENOUS; SUBCUTANEOUS at 06:10

## 2020-01-01 RX ADMIN — ONDANSETRON 4 MILLIGRAM(S): 8 TABLET, FILM COATED ORAL at 08:26

## 2020-01-01 RX ADMIN — Medication 10 MILLIGRAM(S): at 19:46

## 2020-01-01 RX ADMIN — Medication 5 MILLIGRAM(S): at 22:46

## 2020-01-01 RX ADMIN — PANTOPRAZOLE SODIUM 40 MILLIGRAM(S): 20 TABLET, DELAYED RELEASE ORAL at 08:16

## 2020-01-01 RX ADMIN — Medication 30 MILLILITER(S): at 15:11

## 2020-01-01 RX ADMIN — ZOLPIDEM TARTRATE 5 MILLIGRAM(S): 10 TABLET ORAL at 22:03

## 2020-01-01 RX ADMIN — PANTOPRAZOLE SODIUM 40 MILLIGRAM(S): 20 TABLET, DELAYED RELEASE ORAL at 13:04

## 2020-01-01 RX ADMIN — Medication 5 MILLIGRAM(S): at 02:30

## 2020-01-01 RX ADMIN — ONDANSETRON 4 MILLIGRAM(S): 8 TABLET, FILM COATED ORAL at 01:22

## 2020-01-01 RX ADMIN — Medication 5 MILLIGRAM(S): at 02:21

## 2020-01-01 RX ADMIN — ONDANSETRON 4 MILLIGRAM(S): 8 TABLET, FILM COATED ORAL at 16:17

## 2020-01-01 RX ADMIN — Medication 10 MILLIGRAM(S): at 10:55

## 2020-01-01 RX ADMIN — MORPHINE SULFATE 4 MILLIGRAM(S): 50 CAPSULE, EXTENDED RELEASE ORAL at 11:31

## 2020-01-01 RX ADMIN — FAMOTIDINE 20 MILLIGRAM(S): 10 INJECTION INTRAVENOUS at 10:54

## 2020-01-01 RX ADMIN — PIPERACILLIN AND TAZOBACTAM 25 GRAM(S): 4; .5 INJECTION, POWDER, LYOPHILIZED, FOR SOLUTION INTRAVENOUS at 11:58

## 2020-01-01 RX ADMIN — HEPARIN SODIUM 5000 UNIT(S): 5000 INJECTION INTRAVENOUS; SUBCUTANEOUS at 06:22

## 2020-01-01 RX ADMIN — SODIUM CHLORIDE 1000 MILLILITER(S): 9 INJECTION INTRAMUSCULAR; INTRAVENOUS; SUBCUTANEOUS at 17:38

## 2020-01-01 RX ADMIN — APREPITANT 125 MILLIGRAM(S): 80 CAPSULE ORAL at 12:49

## 2020-01-01 RX ADMIN — PIPERACILLIN AND TAZOBACTAM 25 GRAM(S): 4; .5 INJECTION, POWDER, LYOPHILIZED, FOR SOLUTION INTRAVENOUS at 05:11

## 2020-01-01 RX ADMIN — HYDROMORPHONE HYDROCHLORIDE 2 MILLIGRAM(S): 2 INJECTION INTRAMUSCULAR; INTRAVENOUS; SUBCUTANEOUS at 03:37

## 2020-01-01 RX ADMIN — POLYETHYLENE GLYCOL 3350 17 GRAM(S): 17 POWDER, FOR SOLUTION ORAL at 17:25

## 2020-01-01 RX ADMIN — Medication 10 MILLIGRAM(S): at 13:35

## 2020-01-01 RX ADMIN — Medication 400 MILLIGRAM(S): at 12:28

## 2020-01-01 RX ADMIN — PIPERACILLIN AND TAZOBACTAM 25 GRAM(S): 4; .5 INJECTION, POWDER, LYOPHILIZED, FOR SOLUTION INTRAVENOUS at 22:21

## 2020-01-01 RX ADMIN — Medication 1 MILLIGRAM(S): at 21:07

## 2020-01-01 RX ADMIN — Medication 25 MILLIGRAM(S): at 23:08

## 2020-01-01 RX ADMIN — MORPHINE SULFATE 4 MILLIGRAM(S): 50 CAPSULE, EXTENDED RELEASE ORAL at 03:26

## 2020-01-01 RX ADMIN — ONDANSETRON 4 MILLIGRAM(S): 8 TABLET, FILM COATED ORAL at 13:51

## 2020-01-01 RX ADMIN — OCTREOTIDE ACETATE 150 MICROGRAM(S): 200 INJECTION, SOLUTION INTRAVENOUS; SUBCUTANEOUS at 22:18

## 2020-01-01 RX ADMIN — ENOXAPARIN SODIUM 40 MILLIGRAM(S): 100 INJECTION SUBCUTANEOUS at 11:33

## 2020-01-01 RX ADMIN — ONDANSETRON 4 MILLIGRAM(S): 8 TABLET, FILM COATED ORAL at 14:19

## 2020-01-01 RX ADMIN — ONDANSETRON 4 MILLIGRAM(S): 8 TABLET, FILM COATED ORAL at 02:16

## 2020-01-01 RX ADMIN — SODIUM CHLORIDE 1000 MILLILITER(S): 9 INJECTION INTRAMUSCULAR; INTRAVENOUS; SUBCUTANEOUS at 18:05

## 2020-01-01 RX ADMIN — HEPARIN SODIUM 5000 UNIT(S): 5000 INJECTION INTRAVENOUS; SUBCUTANEOUS at 14:20

## 2020-01-01 RX ADMIN — SODIUM CHLORIDE 1000 MILLILITER(S): 9 INJECTION INTRAMUSCULAR; INTRAVENOUS; SUBCUTANEOUS at 17:00

## 2020-01-01 RX ADMIN — ONDANSETRON 4 MILLIGRAM(S): 8 TABLET, FILM COATED ORAL at 01:57

## 2020-01-01 RX ADMIN — HYDROMORPHONE HYDROCHLORIDE 2 MILLIGRAM(S): 2 INJECTION INTRAMUSCULAR; INTRAVENOUS; SUBCUTANEOUS at 09:59

## 2020-01-01 RX ADMIN — Medication 1 TABLET(S): at 09:50

## 2020-01-01 RX ADMIN — PANTOPRAZOLE SODIUM 40 MILLIGRAM(S): 20 TABLET, DELAYED RELEASE ORAL at 11:32

## 2020-01-01 RX ADMIN — ENOXAPARIN SODIUM 90 MILLIGRAM(S): 100 INJECTION SUBCUTANEOUS at 10:54

## 2020-01-01 RX ADMIN — FENTANYL CITRATE 1 PATCH: 50 INJECTION INTRAVENOUS at 20:22

## 2020-01-01 RX ADMIN — PIPERACILLIN AND TAZOBACTAM 25 GRAM(S): 4; .5 INJECTION, POWDER, LYOPHILIZED, FOR SOLUTION INTRAVENOUS at 13:35

## 2020-01-01 RX ADMIN — OCTREOTIDE ACETATE 150 MICROGRAM(S): 200 INJECTION, SOLUTION INTRAVENOUS; SUBCUTANEOUS at 06:04

## 2020-01-01 RX ADMIN — Medication 30 MILLILITER(S): at 04:57

## 2020-01-01 RX ADMIN — SODIUM CHLORIDE 75 MILLILITER(S): 9 INJECTION INTRAMUSCULAR; INTRAVENOUS; SUBCUTANEOUS at 17:22

## 2020-01-01 RX ADMIN — Medication 650 MILLIGRAM(S): at 23:52

## 2020-01-01 RX ADMIN — SODIUM CHLORIDE 2000 MILLILITER(S): 9 INJECTION INTRAMUSCULAR; INTRAVENOUS; SUBCUTANEOUS at 16:47

## 2020-01-01 RX ADMIN — Medication 500000 UNIT(S): at 22:03

## 2020-01-01 RX ADMIN — PIPERACILLIN AND TAZOBACTAM 25 GRAM(S): 4; .5 INJECTION, POWDER, LYOPHILIZED, FOR SOLUTION INTRAVENOUS at 05:39

## 2020-01-01 RX ADMIN — ONDANSETRON 4 MILLIGRAM(S): 8 TABLET, FILM COATED ORAL at 16:47

## 2020-01-01 RX ADMIN — FENTANYL CITRATE 1 PATCH: 50 INJECTION INTRAVENOUS at 00:50

## 2020-01-01 RX ADMIN — MORPHINE SULFATE 4 MILLIGRAM(S): 50 CAPSULE, EXTENDED RELEASE ORAL at 11:46

## 2020-01-01 RX ADMIN — HEPARIN SODIUM 5000 UNIT(S): 5000 INJECTION INTRAVENOUS; SUBCUTANEOUS at 15:36

## 2020-01-01 RX ADMIN — Medication 5 MILLIGRAM(S): at 21:01

## 2020-01-01 RX ADMIN — OCTREOTIDE ACETATE 100 MICROGRAM(S): 200 INJECTION, SOLUTION INTRAVENOUS; SUBCUTANEOUS at 06:24

## 2020-01-01 RX ADMIN — Medication 10 MILLIGRAM(S): at 10:00

## 2020-01-01 RX ADMIN — Medication 650 MILLIGRAM(S): at 16:09

## 2020-01-01 RX ADMIN — ENOXAPARIN SODIUM 60 MILLIGRAM(S): 100 INJECTION SUBCUTANEOUS at 09:44

## 2020-01-01 RX ADMIN — Medication 1 MILLIGRAM(S): at 05:39

## 2020-01-01 RX ADMIN — Medication 10 MILLIGRAM(S): at 09:51

## 2020-01-01 RX ADMIN — ONDANSETRON 4 MILLIGRAM(S): 8 TABLET, FILM COATED ORAL at 22:44

## 2020-01-01 RX ADMIN — ENOXAPARIN SODIUM 40 MILLIGRAM(S): 100 INJECTION SUBCUTANEOUS at 13:04

## 2020-01-01 RX ADMIN — Medication 10 MILLIGRAM(S): at 09:12

## 2020-01-01 RX ADMIN — ONDANSETRON 4 MILLIGRAM(S): 8 TABLET, FILM COATED ORAL at 06:34

## 2020-01-01 RX ADMIN — ONDANSETRON 4 MILLIGRAM(S): 8 TABLET, FILM COATED ORAL at 20:22

## 2020-01-01 RX ADMIN — HEPARIN SODIUM 5000 UNIT(S): 5000 INJECTION INTRAVENOUS; SUBCUTANEOUS at 06:04

## 2020-01-01 RX ADMIN — Medication 650 MILLIGRAM(S): at 09:50

## 2020-01-01 RX ADMIN — SODIUM CHLORIDE 1000 MILLILITER(S): 9 INJECTION INTRAMUSCULAR; INTRAVENOUS; SUBCUTANEOUS at 11:20

## 2020-01-01 RX ADMIN — Medication 25 MILLIGRAM(S): at 18:41

## 2020-01-01 RX ADMIN — PANTOPRAZOLE SODIUM 40 MILLIGRAM(S): 20 TABLET, DELAYED RELEASE ORAL at 11:10

## 2020-01-01 RX ADMIN — ONDANSETRON 4 MILLIGRAM(S): 8 TABLET, FILM COATED ORAL at 23:01

## 2020-01-01 RX ADMIN — Medication 10 MILLIGRAM(S): at 17:06

## 2020-01-01 RX ADMIN — FENTANYL CITRATE 1 PATCH: 50 INJECTION INTRAVENOUS at 08:03

## 2020-01-01 RX ADMIN — Medication 10 MILLIGRAM(S): at 13:42

## 2020-01-01 RX ADMIN — FENTANYL CITRATE 1 PATCH: 50 INJECTION INTRAVENOUS at 10:19

## 2020-01-01 RX ADMIN — Medication 200 MILLIGRAM(S): at 11:33

## 2020-01-01 RX ADMIN — DEXTROSE MONOHYDRATE, SODIUM CHLORIDE, AND POTASSIUM CHLORIDE 60 MILLILITER(S): 50; .745; 4.5 INJECTION, SOLUTION INTRAVENOUS at 11:04

## 2020-01-01 RX ADMIN — FAMOTIDINE 20 MILLIGRAM(S): 10 INJECTION INTRAVENOUS at 05:11

## 2020-01-01 RX ADMIN — SODIUM CHLORIDE 50 MILLILITER(S): 9 INJECTION, SOLUTION INTRAVENOUS at 21:09

## 2020-01-01 RX ADMIN — ONDANSETRON 4 MILLIGRAM(S): 8 TABLET, FILM COATED ORAL at 17:26

## 2020-01-01 RX ADMIN — FENTANYL CITRATE 1 PATCH: 50 INJECTION INTRAVENOUS at 23:57

## 2020-01-01 RX ADMIN — Medication 106 MILLIGRAM(S): at 14:18

## 2020-01-01 RX ADMIN — Medication 1000 MILLIGRAM(S): at 12:00

## 2020-01-01 RX ADMIN — FENTANYL CITRATE 1 PATCH: 50 INJECTION INTRAVENOUS at 20:00

## 2020-01-01 RX ADMIN — Medication 650 MILLIGRAM(S): at 15:08

## 2020-01-01 RX ADMIN — FENTANYL CITRATE 1 PATCH: 50 INJECTION INTRAVENOUS at 09:34

## 2020-01-01 RX ADMIN — ONDANSETRON 4 MILLIGRAM(S): 8 TABLET, FILM COATED ORAL at 05:51

## 2020-01-01 RX ADMIN — POLYETHYLENE GLYCOL 3350 17 GRAM(S): 17 POWDER, FOR SOLUTION ORAL at 10:01

## 2020-01-01 RX ADMIN — HEPARIN SODIUM 5000 UNIT(S): 5000 INJECTION INTRAVENOUS; SUBCUTANEOUS at 05:55

## 2020-01-01 RX ADMIN — Medication 1000 MILLIGRAM(S): at 00:20

## 2020-01-01 RX ADMIN — FENTANYL CITRATE 1 PATCH: 50 INJECTION INTRAVENOUS at 23:58

## 2020-01-01 RX ADMIN — Medication 5 MILLIGRAM(S): at 23:02

## 2020-01-01 RX ADMIN — HEPARIN SODIUM 5000 UNIT(S): 5000 INJECTION INTRAVENOUS; SUBCUTANEOUS at 13:51

## 2020-01-01 RX ADMIN — Medication 10 MILLIGRAM(S): at 12:15

## 2020-01-01 RX ADMIN — Medication 5 MILLIGRAM(S): at 21:54

## 2020-01-01 RX ADMIN — OCTREOTIDE ACETATE 150 MICROGRAM(S): 200 INJECTION, SOLUTION INTRAVENOUS; SUBCUTANEOUS at 06:28

## 2020-01-01 RX ADMIN — HYDROMORPHONE HYDROCHLORIDE 1 MILLIGRAM(S): 2 INJECTION INTRAMUSCULAR; INTRAVENOUS; SUBCUTANEOUS at 23:13

## 2020-01-01 RX ADMIN — MORPHINE SULFATE 2 MILLIGRAM(S): 50 CAPSULE, EXTENDED RELEASE ORAL at 04:05

## 2020-01-01 RX ADMIN — Medication 1 MILLIGRAM(S): at 21:06

## 2020-01-01 RX ADMIN — ONDANSETRON 4 MILLIGRAM(S): 8 TABLET, FILM COATED ORAL at 23:12

## 2020-01-01 RX ADMIN — FAMOTIDINE 20 MILLIGRAM(S): 10 INJECTION INTRAVENOUS at 11:06

## 2020-01-01 RX ADMIN — POLYETHYLENE GLYCOL 3350 17 GRAM(S): 17 POWDER, FOR SOLUTION ORAL at 11:31

## 2020-01-01 RX ADMIN — Medication 1 MILLIGRAM(S): at 06:38

## 2020-01-01 RX ADMIN — HEPARIN SODIUM 5000 UNIT(S): 5000 INJECTION INTRAVENOUS; SUBCUTANEOUS at 05:08

## 2020-01-01 RX ADMIN — SODIUM CHLORIDE 1000 MILLILITER(S): 9 INJECTION INTRAMUSCULAR; INTRAVENOUS; SUBCUTANEOUS at 12:20

## 2020-01-01 RX ADMIN — Medication 106 MILLIGRAM(S): at 12:49

## 2020-01-01 RX ADMIN — FENTANYL CITRATE 1 PATCH: 50 INJECTION INTRAVENOUS at 15:34

## 2020-01-01 RX ADMIN — Medication 106 MILLIGRAM(S): at 10:48

## 2020-01-01 RX ADMIN — Medication 1 TABLET(S): at 13:04

## 2020-01-01 RX ADMIN — Medication 1 MILLIGRAM(S): at 22:20

## 2020-01-01 RX ADMIN — PANTOPRAZOLE SODIUM 40 MILLIGRAM(S): 20 TABLET, DELAYED RELEASE ORAL at 12:46

## 2020-01-01 RX ADMIN — PANTOPRAZOLE SODIUM 40 MILLIGRAM(S): 20 TABLET, DELAYED RELEASE ORAL at 11:31

## 2020-01-01 RX ADMIN — FENTANYL CITRATE 1 PATCH: 50 INJECTION INTRAVENOUS at 23:09

## 2020-01-01 RX ADMIN — OCTREOTIDE ACETATE 100 MICROGRAM(S): 200 INJECTION, SOLUTION INTRAVENOUS; SUBCUTANEOUS at 22:20

## 2020-01-01 RX ADMIN — Medication 10 MILLIGRAM(S): at 14:40

## 2020-01-01 RX ADMIN — Medication 1 EACH: at 22:38

## 2020-01-01 RX ADMIN — PANTOPRAZOLE SODIUM 40 MILLIGRAM(S): 20 TABLET, DELAYED RELEASE ORAL at 11:24

## 2020-01-01 RX ADMIN — PANTOPRAZOLE SODIUM 40 MILLIGRAM(S): 20 TABLET, DELAYED RELEASE ORAL at 10:28

## 2020-01-01 RX ADMIN — Medication 1 TABLET(S): at 12:46

## 2020-01-01 RX ADMIN — FENTANYL CITRATE 1 PATCH: 50 INJECTION INTRAVENOUS at 19:45

## 2020-01-01 RX ADMIN — POLYETHYLENE GLYCOL 3350 17 GRAM(S): 17 POWDER, FOR SOLUTION ORAL at 12:46

## 2020-01-01 RX ADMIN — MORPHINE SULFATE 4 MILLIGRAM(S): 50 CAPSULE, EXTENDED RELEASE ORAL at 07:35

## 2020-01-01 RX ADMIN — Medication 650 MILLIGRAM(S): at 11:41

## 2020-01-01 RX ADMIN — Medication 650 MILLIGRAM(S): at 23:20

## 2020-01-01 RX ADMIN — FAMOTIDINE 20 MILLIGRAM(S): 10 INJECTION INTRAVENOUS at 21:07

## 2020-01-01 RX ADMIN — POLYETHYLENE GLYCOL 3350 17 GRAM(S): 17 POWDER, FOR SOLUTION ORAL at 21:49

## 2020-01-01 RX ADMIN — Medication 1 TABLET(S): at 09:53

## 2020-01-01 RX ADMIN — HEPARIN SODIUM 5000 UNIT(S): 5000 INJECTION INTRAVENOUS; SUBCUTANEOUS at 15:02

## 2020-01-01 RX ADMIN — ENOXAPARIN SODIUM 90 MILLIGRAM(S): 100 INJECTION SUBCUTANEOUS at 12:17

## 2020-01-01 RX ADMIN — Medication 400 MILLIGRAM(S): at 10:48

## 2020-01-01 RX ADMIN — DIPHENHYDRAMINE HYDROCHLORIDE AND LIDOCAINE HYDROCHLORIDE AND ALUMINUM HYDROXIDE AND MAGNESIUM HYDRO 10 MILLILITER(S): KIT at 22:21

## 2020-01-01 RX ADMIN — OCTREOTIDE ACETATE 150 MICROGRAM(S): 200 INJECTION, SOLUTION INTRAVENOUS; SUBCUTANEOUS at 05:48

## 2020-01-01 RX ADMIN — SODIUM CHLORIDE 120 MILLILITER(S): 9 INJECTION INTRAMUSCULAR; INTRAVENOUS; SUBCUTANEOUS at 22:39

## 2020-01-01 RX ADMIN — OCTREOTIDE ACETATE 150 MICROGRAM(S): 200 INJECTION, SOLUTION INTRAVENOUS; SUBCUTANEOUS at 05:55

## 2020-01-01 RX ADMIN — SODIUM CHLORIDE 60 MILLILITER(S): 9 INJECTION, SOLUTION INTRAVENOUS at 14:41

## 2020-01-01 RX ADMIN — HEPARIN SODIUM 5000 UNIT(S): 5000 INJECTION INTRAVENOUS; SUBCUTANEOUS at 05:48

## 2020-01-01 RX ADMIN — PIPERACILLIN AND TAZOBACTAM 25 GRAM(S): 4; .5 INJECTION, POWDER, LYOPHILIZED, FOR SOLUTION INTRAVENOUS at 21:17

## 2020-01-01 RX ADMIN — SODIUM CHLORIDE 1000 MILLILITER(S): 9 INJECTION INTRAMUSCULAR; INTRAVENOUS; SUBCUTANEOUS at 13:27

## 2020-01-01 RX ADMIN — ONDANSETRON 4 MILLIGRAM(S): 8 TABLET, FILM COATED ORAL at 06:04

## 2020-01-01 RX ADMIN — PANTOPRAZOLE SODIUM 20 MILLIGRAM(S): 20 TABLET, DELAYED RELEASE ORAL at 20:37

## 2020-01-01 RX ADMIN — SODIUM CHLORIDE 3 MILLILITER(S): 9 INJECTION INTRAMUSCULAR; INTRAVENOUS; SUBCUTANEOUS at 16:52

## 2020-01-01 RX ADMIN — FENTANYL CITRATE 1 PATCH: 50 INJECTION INTRAVENOUS at 08:09

## 2020-01-01 RX ADMIN — ENOXAPARIN SODIUM 60 MILLIGRAM(S): 100 INJECTION SUBCUTANEOUS at 11:05

## 2020-01-01 RX ADMIN — MORPHINE SULFATE 2 MILLIGRAM(S): 50 CAPSULE, EXTENDED RELEASE ORAL at 03:50

## 2020-01-01 RX ADMIN — Medication 10 MILLIGRAM(S): at 09:19

## 2020-01-01 RX ADMIN — ONDANSETRON 4 MILLIGRAM(S): 8 TABLET, FILM COATED ORAL at 15:11

## 2020-01-01 RX ADMIN — PANTOPRAZOLE SODIUM 40 MILLIGRAM(S): 20 TABLET, DELAYED RELEASE ORAL at 09:51

## 2020-01-01 RX ADMIN — Medication 30 MILLILITER(S): at 11:41

## 2020-01-01 RX ADMIN — Medication 30 MILLILITER(S): at 06:38

## 2020-01-01 RX ADMIN — ONDANSETRON 112 MILLIGRAM(S): 8 TABLET, FILM COATED ORAL at 12:49

## 2020-01-01 RX ADMIN — HEPARIN SODIUM 5000 UNIT(S): 5000 INJECTION INTRAVENOUS; SUBCUTANEOUS at 19:16

## 2020-01-01 RX ADMIN — DIPHENHYDRAMINE HYDROCHLORIDE AND LIDOCAINE HYDROCHLORIDE AND ALUMINUM HYDROXIDE AND MAGNESIUM HYDRO 10 MILLILITER(S): KIT at 23:53

## 2020-01-01 RX ADMIN — Medication 1 TABLET(S): at 09:20

## 2020-01-01 RX ADMIN — SIMETHICONE 80 MILLIGRAM(S): 80 TABLET, CHEWABLE ORAL at 04:22

## 2020-01-01 RX ADMIN — MIRTAZAPINE 7.5 MILLIGRAM(S): 45 TABLET, ORALLY DISINTEGRATING ORAL at 22:20

## 2020-01-01 RX ADMIN — PIPERACILLIN AND TAZOBACTAM 25 GRAM(S): 4; .5 INJECTION, POWDER, LYOPHILIZED, FOR SOLUTION INTRAVENOUS at 13:16

## 2020-01-01 RX ADMIN — Medication 106 MILLIGRAM(S): at 10:28

## 2020-01-01 RX ADMIN — Medication 1 TABLET(S): at 11:31

## 2020-01-01 RX ADMIN — DIPHENHYDRAMINE HYDROCHLORIDE AND LIDOCAINE HYDROCHLORIDE AND ALUMINUM HYDROXIDE AND MAGNESIUM HYDRO 10 MILLILITER(S): KIT at 09:20

## 2020-01-01 RX ADMIN — ONDANSETRON 4 MILLIGRAM(S): 8 TABLET, FILM COATED ORAL at 01:02

## 2020-01-01 RX ADMIN — Medication 650 MILLIGRAM(S): at 12:52

## 2020-01-01 RX ADMIN — SENNA PLUS 2 TABLET(S): 8.6 TABLET ORAL at 20:07

## 2020-01-01 RX ADMIN — OCTREOTIDE ACETATE 150 MICROGRAM(S): 200 INJECTION, SOLUTION INTRAVENOUS; SUBCUTANEOUS at 23:01

## 2020-01-01 RX ADMIN — SODIUM CHLORIDE 120 MILLILITER(S): 9 INJECTION INTRAMUSCULAR; INTRAVENOUS; SUBCUTANEOUS at 22:21

## 2020-01-01 RX ADMIN — FENTANYL CITRATE 1 PATCH: 50 INJECTION INTRAVENOUS at 19:52

## 2020-01-01 RX ADMIN — FAMOTIDINE 20 MILLIGRAM(S): 10 INJECTION INTRAVENOUS at 09:19

## 2020-01-01 RX ADMIN — FENTANYL CITRATE 1 PATCH: 50 INJECTION INTRAVENOUS at 08:28

## 2020-01-01 RX ADMIN — ONDANSETRON 4 MILLIGRAM(S): 8 TABLET, FILM COATED ORAL at 06:28

## 2020-01-01 RX ADMIN — Medication 10 MILLIGRAM(S): at 16:56

## 2020-01-01 RX ADMIN — PIPERACILLIN AND TAZOBACTAM 25 GRAM(S): 4; .5 INJECTION, POWDER, LYOPHILIZED, FOR SOLUTION INTRAVENOUS at 13:53

## 2020-01-01 RX ADMIN — FAMOTIDINE 20 MILLIGRAM(S): 10 INJECTION INTRAVENOUS at 13:18

## 2020-01-01 RX ADMIN — APREPITANT 80 MILLIGRAM(S): 80 CAPSULE ORAL at 13:04

## 2020-01-01 RX ADMIN — Medication 1 EACH: at 22:48

## 2020-01-01 RX ADMIN — Medication 650 MILLIGRAM(S): at 06:30

## 2020-01-01 RX ADMIN — FAMOTIDINE 20 MILLIGRAM(S): 10 INJECTION INTRAVENOUS at 10:00

## 2020-01-01 RX ADMIN — ONDANSETRON 4 MILLIGRAM(S): 8 TABLET, FILM COATED ORAL at 15:35

## 2020-01-01 RX ADMIN — Medication 10 MILLIGRAM(S): at 18:07

## 2020-01-01 RX ADMIN — SODIUM CHLORIDE 120 MILLILITER(S): 9 INJECTION INTRAMUSCULAR; INTRAVENOUS; SUBCUTANEOUS at 05:08

## 2020-01-01 RX ADMIN — HYDROMORPHONE HYDROCHLORIDE 1 MILLIGRAM(S): 2 INJECTION INTRAMUSCULAR; INTRAVENOUS; SUBCUTANEOUS at 22:43

## 2020-01-01 RX ADMIN — SENNA PLUS 2 TABLET(S): 8.6 TABLET ORAL at 21:23

## 2020-01-01 RX ADMIN — APREPITANT 80 MILLIGRAM(S): 80 CAPSULE ORAL at 13:58

## 2020-01-01 RX ADMIN — Medication 10 MILLIGRAM(S): at 08:18

## 2020-01-01 RX ADMIN — PANTOPRAZOLE SODIUM 40 MILLIGRAM(S): 20 TABLET, DELAYED RELEASE ORAL at 09:53

## 2020-01-01 RX ADMIN — Medication 753.73 MILLIGRAM(S): at 16:59

## 2020-01-01 RX ADMIN — DIPHENHYDRAMINE HYDROCHLORIDE AND LIDOCAINE HYDROCHLORIDE AND ALUMINUM HYDROXIDE AND MAGNESIUM HYDRO 10 MILLILITER(S): KIT at 21:49

## 2020-01-01 RX ADMIN — Medication 20 MILLIEQUIVALENT(S): at 11:10

## 2020-01-01 RX ADMIN — Medication 200 MILLIGRAM(S): at 00:04

## 2020-01-01 RX ADMIN — FAMOTIDINE 20 MILLIGRAM(S): 10 INJECTION INTRAVENOUS at 09:44

## 2020-01-01 RX ADMIN — ONDANSETRON 4 MILLIGRAM(S): 8 TABLET, FILM COATED ORAL at 17:58

## 2020-01-01 RX ADMIN — Medication 400 MILLIGRAM(S): at 18:32

## 2020-01-01 RX ADMIN — FENTANYL CITRATE 1 PATCH: 50 INJECTION INTRAVENOUS at 02:03

## 2020-01-01 RX ADMIN — FAMOTIDINE 20 MILLIGRAM(S): 10 INJECTION INTRAVENOUS at 23:07

## 2020-01-01 RX ADMIN — Medication 30 MILLILITER(S): at 23:33

## 2020-01-01 RX ADMIN — PIPERACILLIN AND TAZOBACTAM 25 GRAM(S): 4; .5 INJECTION, POWDER, LYOPHILIZED, FOR SOLUTION INTRAVENOUS at 13:04

## 2020-01-01 RX ADMIN — Medication 400 MILLIGRAM(S): at 16:50

## 2020-01-01 RX ADMIN — Medication 25 MILLIGRAM(S): at 05:11

## 2020-01-01 RX ADMIN — MORPHINE SULFATE 4 MILLIGRAM(S): 50 CAPSULE, EXTENDED RELEASE ORAL at 21:57

## 2020-01-01 RX ADMIN — HEPARIN SODIUM 5000 UNIT(S): 5000 INJECTION INTRAVENOUS; SUBCUTANEOUS at 22:46

## 2020-01-01 RX ADMIN — Medication 400 MILLIGRAM(S): at 00:04

## 2020-01-01 RX ADMIN — FAMOTIDINE 20 MILLIGRAM(S): 10 INJECTION INTRAVENOUS at 20:04

## 2020-01-01 RX ADMIN — PANTOPRAZOLE SODIUM 40 MILLIGRAM(S): 20 TABLET, DELAYED RELEASE ORAL at 10:48

## 2020-01-01 RX ADMIN — Medication 106 MILLIGRAM(S): at 09:40

## 2020-01-01 RX ADMIN — PIPERACILLIN AND TAZOBACTAM 25 GRAM(S): 4; .5 INJECTION, POWDER, LYOPHILIZED, FOR SOLUTION INTRAVENOUS at 22:23

## 2020-01-01 RX ADMIN — ONDANSETRON 4 MILLIGRAM(S): 8 TABLET, FILM COATED ORAL at 05:07

## 2020-01-01 RX ADMIN — ONDANSETRON 4 MILLIGRAM(S): 8 TABLET, FILM COATED ORAL at 09:49

## 2020-01-01 RX ADMIN — ONDANSETRON 4 MILLIGRAM(S): 8 TABLET, FILM COATED ORAL at 11:55

## 2020-01-01 RX ADMIN — DEXTROSE MONOHYDRATE, SODIUM CHLORIDE, AND POTASSIUM CHLORIDE 60 MILLILITER(S): 50; .745; 4.5 INJECTION, SOLUTION INTRAVENOUS at 16:23

## 2020-01-01 RX ADMIN — Medication 1 MILLIGRAM(S): at 07:40

## 2020-01-01 RX ADMIN — OCTREOTIDE ACETATE 100 MICROGRAM(S): 200 INJECTION, SOLUTION INTRAVENOUS; SUBCUTANEOUS at 06:19

## 2020-01-01 RX ADMIN — ENOXAPARIN SODIUM 40 MILLIGRAM(S): 100 INJECTION SUBCUTANEOUS at 11:31

## 2020-01-01 RX ADMIN — OCTREOTIDE ACETATE 150 MICROGRAM(S): 200 INJECTION, SOLUTION INTRAVENOUS; SUBCUTANEOUS at 22:38

## 2020-01-01 RX ADMIN — Medication 1 SPRAY(S): at 11:44

## 2020-01-01 RX ADMIN — SODIUM CHLORIDE 1000 MILLILITER(S): 9 INJECTION INTRAMUSCULAR; INTRAVENOUS; SUBCUTANEOUS at 18:00

## 2020-01-01 RX ADMIN — Medication 650 MILLIGRAM(S): at 17:27

## 2020-01-01 RX ADMIN — POLYETHYLENE GLYCOL 3350 17 GRAM(S): 17 POWDER, FOR SOLUTION ORAL at 17:22

## 2020-01-01 RX ADMIN — ONDANSETRON 4 MILLIGRAM(S): 8 TABLET, FILM COATED ORAL at 21:57

## 2020-01-01 RX ADMIN — PANTOPRAZOLE SODIUM 40 MILLIGRAM(S): 20 TABLET, DELAYED RELEASE ORAL at 10:19

## 2020-01-01 RX ADMIN — SIMETHICONE 80 MILLIGRAM(S): 80 TABLET, CHEWABLE ORAL at 00:23

## 2020-01-01 RX ADMIN — FENTANYL CITRATE 1 PATCH: 50 INJECTION INTRAVENOUS at 20:07

## 2020-01-01 RX ADMIN — ENOXAPARIN SODIUM 60 MILLIGRAM(S): 100 INJECTION SUBCUTANEOUS at 10:00

## 2020-01-01 RX ADMIN — SODIUM CHLORIDE 120 MILLILITER(S): 9 INJECTION INTRAMUSCULAR; INTRAVENOUS; SUBCUTANEOUS at 01:44

## 2020-01-01 RX ADMIN — Medication 10 MILLIGRAM(S): at 11:32

## 2020-01-01 RX ADMIN — Medication 30 MILLILITER(S): at 02:16

## 2020-01-01 RX ADMIN — MORPHINE SULFATE 4 MILLIGRAM(S): 50 CAPSULE, EXTENDED RELEASE ORAL at 03:11

## 2020-01-01 RX ADMIN — Medication 0.5 MILLIGRAM(S): at 22:57

## 2020-01-01 RX ADMIN — SIMETHICONE 80 MILLIGRAM(S): 80 TABLET, CHEWABLE ORAL at 11:24

## 2020-01-01 RX ADMIN — DOXORUBICIN HYDROCHLORIDE 363.33 MILLIGRAM(S): 2 INJECTABLE, LIPOSOMAL INTRAVENOUS at 14:11

## 2020-01-01 RX ADMIN — FENTANYL CITRATE 1 PATCH: 50 INJECTION INTRAVENOUS at 22:56

## 2020-01-01 RX ADMIN — FAMOTIDINE 20 MILLIGRAM(S): 10 INJECTION INTRAVENOUS at 12:15

## 2020-01-01 RX ADMIN — Medication 10 MILLIGRAM(S): at 18:19

## 2020-01-01 RX ADMIN — OCTREOTIDE ACETATE 150 MICROGRAM(S): 200 INJECTION, SOLUTION INTRAVENOUS; SUBCUTANEOUS at 13:52

## 2020-01-01 RX ADMIN — ENOXAPARIN SODIUM 90 MILLIGRAM(S): 100 INJECTION SUBCUTANEOUS at 21:00

## 2020-01-01 RX ADMIN — FENTANYL CITRATE 1 PATCH: 50 INJECTION INTRAVENOUS at 21:00

## 2020-01-01 RX ADMIN — ONDANSETRON 4 MILLIGRAM(S): 8 TABLET, FILM COATED ORAL at 03:50

## 2020-01-01 RX ADMIN — Medication 1 MILLIGRAM(S): at 22:37

## 2020-01-01 RX ADMIN — FENTANYL CITRATE 1 PATCH: 50 INJECTION INTRAVENOUS at 23:19

## 2020-01-01 RX ADMIN — Medication 1 MILLIGRAM(S): at 06:39

## 2020-01-01 RX ADMIN — PIPERACILLIN AND TAZOBACTAM 25 GRAM(S): 4; .5 INJECTION, POWDER, LYOPHILIZED, FOR SOLUTION INTRAVENOUS at 06:20

## 2020-01-01 RX ADMIN — FENTANYL CITRATE 1 PATCH: 50 INJECTION INTRAVENOUS at 00:55

## 2020-01-01 RX ADMIN — ZOLPIDEM TARTRATE 5 MILLIGRAM(S): 10 TABLET ORAL at 01:49

## 2020-01-01 RX ADMIN — Medication 10 MILLIGRAM(S): at 21:06

## 2020-01-01 RX ADMIN — PIPERACILLIN AND TAZOBACTAM 25 GRAM(S): 4; .5 INJECTION, POWDER, LYOPHILIZED, FOR SOLUTION INTRAVENOUS at 05:37

## 2020-01-01 RX ADMIN — ENOXAPARIN SODIUM 90 MILLIGRAM(S): 100 INJECTION SUBCUTANEOUS at 12:51

## 2020-01-01 RX ADMIN — FENTANYL CITRATE 1 PATCH: 50 INJECTION INTRAVENOUS at 21:54

## 2020-01-01 RX ADMIN — ONDANSETRON 4 MILLIGRAM(S): 8 TABLET, FILM COATED ORAL at 14:26

## 2020-01-01 RX ADMIN — Medication 1 TABLET(S): at 12:15

## 2020-01-01 RX ADMIN — PIPERACILLIN AND TAZOBACTAM 25 GRAM(S): 4; .5 INJECTION, POWDER, LYOPHILIZED, FOR SOLUTION INTRAVENOUS at 05:44

## 2020-01-01 RX ADMIN — PIPERACILLIN AND TAZOBACTAM 25 GRAM(S): 4; .5 INJECTION, POWDER, LYOPHILIZED, FOR SOLUTION INTRAVENOUS at 13:42

## 2020-01-01 RX ADMIN — Medication 600 MILLIGRAM(S): at 00:30

## 2020-01-01 RX ADMIN — PANTOPRAZOLE SODIUM 40 MILLIGRAM(S): 20 TABLET, DELAYED RELEASE ORAL at 17:22

## 2020-01-01 RX ADMIN — DIPHENHYDRAMINE HYDROCHLORIDE AND LIDOCAINE HYDROCHLORIDE AND ALUMINUM HYDROXIDE AND MAGNESIUM HYDRO 10 MILLILITER(S): KIT at 10:01

## 2020-01-01 RX ADMIN — FENTANYL CITRATE 1 PATCH: 50 INJECTION INTRAVENOUS at 17:27

## 2020-01-01 RX ADMIN — Medication 25 MILLIGRAM(S): at 23:54

## 2020-01-01 RX ADMIN — FAMOTIDINE 20 MILLIGRAM(S): 10 INJECTION INTRAVENOUS at 22:20

## 2020-01-01 RX ADMIN — I.V. FAT EMULSION 20.83 GM/KG/DAY: 20 EMULSION INTRAVENOUS at 22:39

## 2020-01-01 RX ADMIN — ONDANSETRON 4 MILLIGRAM(S): 8 TABLET, FILM COATED ORAL at 16:09

## 2020-01-01 RX ADMIN — PANTOPRAZOLE SODIUM 40 MILLIGRAM(S): 20 TABLET, DELAYED RELEASE ORAL at 10:53

## 2020-01-01 RX ADMIN — OCTREOTIDE ACETATE 150 MICROGRAM(S): 200 INJECTION, SOLUTION INTRAVENOUS; SUBCUTANEOUS at 05:07

## 2020-01-01 RX ADMIN — Medication 650 MILLIGRAM(S): at 00:20

## 2020-01-01 RX ADMIN — ONDANSETRON 4 MILLIGRAM(S): 8 TABLET, FILM COATED ORAL at 12:26

## 2020-01-01 RX ADMIN — Medication 106 MILLIGRAM(S): at 22:38

## 2020-01-01 RX ADMIN — ONDANSETRON 4 MILLIGRAM(S): 8 TABLET, FILM COATED ORAL at 19:16

## 2020-01-01 RX ADMIN — ENOXAPARIN SODIUM 90 MILLIGRAM(S): 100 INJECTION SUBCUTANEOUS at 09:52

## 2020-01-01 RX ADMIN — HEPARIN SODIUM 5000 UNIT(S): 5000 INJECTION INTRAVENOUS; SUBCUTANEOUS at 22:20

## 2020-01-01 RX ADMIN — Medication 500000 UNIT(S): at 10:06

## 2020-01-01 RX ADMIN — FENTANYL CITRATE 1 PATCH: 50 INJECTION INTRAVENOUS at 11:09

## 2020-01-01 RX ADMIN — FENTANYL CITRATE 1 PATCH: 50 INJECTION INTRAVENOUS at 08:01

## 2020-01-01 RX ADMIN — FAMOTIDINE 20 MILLIGRAM(S): 10 INJECTION INTRAVENOUS at 22:56

## 2020-01-01 RX ADMIN — HEPARIN SODIUM 5000 UNIT(S): 5000 INJECTION INTRAVENOUS; SUBCUTANEOUS at 22:29

## 2020-01-01 RX ADMIN — HEPARIN SODIUM 5000 UNIT(S): 5000 INJECTION INTRAVENOUS; SUBCUTANEOUS at 15:38

## 2020-01-01 RX ADMIN — FENTANYL CITRATE 1 PATCH: 50 INJECTION INTRAVENOUS at 19:57

## 2020-01-01 RX ADMIN — Medication 30 MILLILITER(S): at 19:57

## 2020-01-01 RX ADMIN — Medication 10 MILLIGRAM(S): at 18:16

## 2020-01-01 RX ADMIN — SODIUM CHLORIDE 120 MILLILITER(S): 9 INJECTION INTRAMUSCULAR; INTRAVENOUS; SUBCUTANEOUS at 12:01

## 2020-01-01 RX ADMIN — FENTANYL CITRATE 1 PATCH: 50 INJECTION INTRAVENOUS at 23:10

## 2020-01-01 RX ADMIN — PIPERACILLIN AND TAZOBACTAM 25 GRAM(S): 4; .5 INJECTION, POWDER, LYOPHILIZED, FOR SOLUTION INTRAVENOUS at 21:08

## 2020-01-01 RX ADMIN — PANTOPRAZOLE SODIUM 40 MILLIGRAM(S): 20 TABLET, DELAYED RELEASE ORAL at 08:36

## 2020-01-01 RX ADMIN — HYDROMORPHONE HYDROCHLORIDE 2 MILLIGRAM(S): 2 INJECTION INTRAMUSCULAR; INTRAVENOUS; SUBCUTANEOUS at 04:37

## 2020-01-01 RX ADMIN — Medication 5 MILLIGRAM(S): at 22:33

## 2020-01-01 RX ADMIN — ONDANSETRON 4 MILLIGRAM(S): 8 TABLET, FILM COATED ORAL at 13:23

## 2020-01-01 RX ADMIN — Medication 5 MILLIGRAM(S): at 22:20

## 2020-01-01 RX ADMIN — Medication 500000 UNIT(S): at 00:05

## 2020-01-01 RX ADMIN — SENNA PLUS 2 TABLET(S): 8.6 TABLET ORAL at 21:43

## 2020-01-01 RX ADMIN — ENOXAPARIN SODIUM 40 MILLIGRAM(S): 100 INJECTION SUBCUTANEOUS at 13:03

## 2020-01-01 RX ADMIN — SODIUM CHLORIDE 1000 MILLILITER(S): 9 INJECTION INTRAMUSCULAR; INTRAVENOUS; SUBCUTANEOUS at 17:27

## 2020-01-01 RX ADMIN — FENTANYL CITRATE 1 PATCH: 50 INJECTION INTRAVENOUS at 11:49

## 2020-01-01 RX ADMIN — FENTANYL CITRATE 1 PATCH: 50 INJECTION INTRAVENOUS at 18:33

## 2020-01-01 RX ADMIN — FENTANYL CITRATE 1 PATCH: 50 INJECTION INTRAVENOUS at 21:52

## 2020-01-01 RX ADMIN — Medication 650 MILLIGRAM(S): at 22:52

## 2020-01-01 RX ADMIN — MORPHINE SULFATE 4 MILLIGRAM(S): 50 CAPSULE, EXTENDED RELEASE ORAL at 21:42

## 2020-01-01 RX ADMIN — Medication 30 MILLILITER(S): at 00:34

## 2020-01-01 RX ADMIN — Medication 1 TABLET(S): at 09:43

## 2020-01-01 RX ADMIN — HEPARIN SODIUM 5000 UNIT(S): 5000 INJECTION INTRAVENOUS; SUBCUTANEOUS at 16:11

## 2020-01-01 RX ADMIN — Medication 400 MILLIGRAM(S): at 11:33

## 2020-01-01 RX ADMIN — Medication 650 MILLIGRAM(S): at 00:01

## 2020-01-01 RX ADMIN — ONDANSETRON 4 MILLIGRAM(S): 8 TABLET, FILM COATED ORAL at 08:05

## 2020-01-01 RX ADMIN — PIPERACILLIN AND TAZOBACTAM 25 GRAM(S): 4; .5 INJECTION, POWDER, LYOPHILIZED, FOR SOLUTION INTRAVENOUS at 02:30

## 2020-01-01 RX ADMIN — HEPARIN SODIUM 5000 UNIT(S): 5000 INJECTION INTRAVENOUS; SUBCUTANEOUS at 06:30

## 2020-01-01 RX ADMIN — ONDANSETRON 4 MILLIGRAM(S): 8 TABLET, FILM COATED ORAL at 05:55

## 2020-01-01 RX ADMIN — MORPHINE SULFATE 4 MILLIGRAM(S): 50 CAPSULE, EXTENDED RELEASE ORAL at 15:33

## 2020-01-01 RX ADMIN — ONDANSETRON 4 MILLIGRAM(S): 8 TABLET, FILM COATED ORAL at 06:21

## 2020-01-01 RX ADMIN — PANTOPRAZOLE SODIUM 40 MILLIGRAM(S): 20 TABLET, DELAYED RELEASE ORAL at 09:40

## 2020-01-01 RX ADMIN — OCTREOTIDE ACETATE 150 MICROGRAM(S): 200 INJECTION, SOLUTION INTRAVENOUS; SUBCUTANEOUS at 16:12

## 2020-01-01 RX ADMIN — PACLITAXEL 186 MILLIGRAM(S): 6 INJECTION, SOLUTION, CONCENTRATE INTRAVENOUS at 13:37

## 2020-01-01 RX ADMIN — FENTANYL CITRATE 1 PATCH: 50 INJECTION INTRAVENOUS at 22:39

## 2020-01-01 RX ADMIN — PANTOPRAZOLE SODIUM 40 MILLIGRAM(S): 20 TABLET, DELAYED RELEASE ORAL at 10:10

## 2020-01-01 RX ADMIN — ONDANSETRON 4 MILLIGRAM(S): 8 TABLET, FILM COATED ORAL at 11:20

## 2020-01-01 RX ADMIN — Medication 1 MILLIGRAM(S): at 05:44

## 2020-01-01 RX ADMIN — Medication 30 MILLILITER(S): at 01:00

## 2020-01-01 RX ADMIN — DIPHENHYDRAMINE HYDROCHLORIDE AND LIDOCAINE HYDROCHLORIDE AND ALUMINUM HYDROXIDE AND MAGNESIUM HYDRO 10 MILLILITER(S): KIT at 21:09

## 2020-01-01 RX ADMIN — Medication 200 MILLIGRAM(S): at 18:18

## 2020-01-01 RX ADMIN — MORPHINE SULFATE 2 MILLIGRAM(S): 50 CAPSULE, EXTENDED RELEASE ORAL at 19:21

## 2020-01-01 RX ADMIN — Medication 30 MILLILITER(S): at 05:52

## 2020-01-01 RX ADMIN — FENTANYL CITRATE 1 PATCH: 50 INJECTION INTRAVENOUS at 00:06

## 2020-01-01 RX ADMIN — MORPHINE SULFATE 4 MILLIGRAM(S): 50 CAPSULE, EXTENDED RELEASE ORAL at 13:26

## 2020-01-01 RX ADMIN — SODIUM CHLORIDE 120 MILLILITER(S): 9 INJECTION INTRAMUSCULAR; INTRAVENOUS; SUBCUTANEOUS at 10:28

## 2020-01-01 RX ADMIN — Medication 400 MILLIGRAM(S): at 12:33

## 2020-01-01 RX ADMIN — POLYETHYLENE GLYCOL 3350 17 GRAM(S): 17 POWDER, FOR SOLUTION ORAL at 13:04

## 2020-01-01 RX ADMIN — FAMOTIDINE 40 MILLIGRAM(S): 10 INJECTION INTRAVENOUS at 12:48

## 2020-01-01 RX ADMIN — OCTREOTIDE ACETATE 100 MICROGRAM(S): 200 INJECTION, SOLUTION INTRAVENOUS; SUBCUTANEOUS at 14:33

## 2020-01-01 RX ADMIN — SODIUM CHLORIDE 50 MILLILITER(S): 9 INJECTION, SOLUTION INTRAVENOUS at 16:06

## 2020-01-01 RX ADMIN — Medication 1 EACH: at 23:23

## 2020-01-01 RX ADMIN — ENOXAPARIN SODIUM 40 MILLIGRAM(S): 100 INJECTION SUBCUTANEOUS at 12:47

## 2020-01-01 RX ADMIN — OCTREOTIDE ACETATE 150 MICROGRAM(S): 200 INJECTION, SOLUTION INTRAVENOUS; SUBCUTANEOUS at 22:39

## 2020-01-01 RX ADMIN — ERGOCALCIFEROL 50000 UNIT(S): 1.25 CAPSULE ORAL at 18:19

## 2020-01-01 RX ADMIN — POLYETHYLENE GLYCOL 3350 17 GRAM(S): 17 POWDER, FOR SOLUTION ORAL at 11:05

## 2020-01-01 RX ADMIN — HEPARIN SODIUM 5000 UNIT(S): 5000 INJECTION INTRAVENOUS; SUBCUTANEOUS at 06:11

## 2020-01-01 RX ADMIN — Medication 1 BOTTLE: at 17:25

## 2020-01-01 RX ADMIN — Medication 106 MILLIGRAM(S): at 13:04

## 2020-01-01 RX ADMIN — FENTANYL CITRATE 1 PATCH: 50 INJECTION INTRAVENOUS at 00:45

## 2020-01-01 RX ADMIN — SODIUM CHLORIDE 50 MILLILITER(S): 9 INJECTION, SOLUTION INTRAVENOUS at 18:34

## 2020-01-01 RX ADMIN — MORPHINE SULFATE 1 MILLIGRAM(S): 50 CAPSULE, EXTENDED RELEASE ORAL at 15:06

## 2020-01-01 RX ADMIN — MORPHINE SULFATE 2 MILLIGRAM(S): 50 CAPSULE, EXTENDED RELEASE ORAL at 19:35

## 2020-01-01 RX ADMIN — Medication 106 MILLIGRAM(S): at 09:43

## 2020-01-01 RX ADMIN — HYDROMORPHONE HYDROCHLORIDE 2 MILLIGRAM(S): 2 INJECTION INTRAMUSCULAR; INTRAVENOUS; SUBCUTANEOUS at 10:29

## 2020-01-01 RX ADMIN — POLYETHYLENE GLYCOL 3350 17 GRAM(S): 17 POWDER, FOR SOLUTION ORAL at 10:55

## 2020-01-01 RX ADMIN — Medication 3 MILLIGRAM(S): at 22:20

## 2020-01-01 RX ADMIN — Medication 30 MILLILITER(S): at 17:09

## 2020-01-01 RX ADMIN — SIMETHICONE 80 MILLIGRAM(S): 80 TABLET, CHEWABLE ORAL at 15:02

## 2020-01-01 RX ADMIN — HYDROMORPHONE HYDROCHLORIDE 0.5 MILLIGRAM(S): 2 INJECTION INTRAMUSCULAR; INTRAVENOUS; SUBCUTANEOUS at 17:19

## 2020-01-01 RX ADMIN — FENTANYL CITRATE 1 PATCH: 50 INJECTION INTRAVENOUS at 07:16

## 2020-01-01 RX ADMIN — MORPHINE SULFATE 4 MILLIGRAM(S): 50 CAPSULE, EXTENDED RELEASE ORAL at 15:48

## 2020-01-01 RX ADMIN — Medication 20 MILLIEQUIVALENT(S): at 17:53

## 2020-01-01 RX ADMIN — POLYETHYLENE GLYCOL 3350 17 GRAM(S): 17 POWDER, FOR SOLUTION ORAL at 12:16

## 2020-01-01 RX ADMIN — FENTANYL CITRATE 1 PATCH: 50 INJECTION INTRAVENOUS at 07:32

## 2020-01-01 RX ADMIN — SIMETHICONE 80 MILLIGRAM(S): 80 TABLET, CHEWABLE ORAL at 08:12

## 2020-01-01 RX ADMIN — ONDANSETRON 4 MILLIGRAM(S): 8 TABLET, FILM COATED ORAL at 17:25

## 2020-01-01 RX ADMIN — HEPARIN SODIUM 5000 UNIT(S): 5000 INJECTION INTRAVENOUS; SUBCUTANEOUS at 21:53

## 2020-01-01 RX ADMIN — MORPHINE SULFATE 4 MILLIGRAM(S): 50 CAPSULE, EXTENDED RELEASE ORAL at 17:22

## 2020-01-01 RX ADMIN — OCTREOTIDE ACETATE 150 MICROGRAM(S): 200 INJECTION, SOLUTION INTRAVENOUS; SUBCUTANEOUS at 15:39

## 2020-01-01 RX ADMIN — ONDANSETRON 4 MILLIGRAM(S): 8 TABLET, FILM COATED ORAL at 15:12

## 2020-01-01 RX ADMIN — Medication 1 MILLIGRAM(S): at 05:11

## 2020-01-01 RX ADMIN — DIPHENHYDRAMINE HYDROCHLORIDE AND LIDOCAINE HYDROCHLORIDE AND ALUMINUM HYDROXIDE AND MAGNESIUM HYDRO 10 MILLILITER(S): KIT at 21:58

## 2020-01-01 RX ADMIN — HYDROMORPHONE HYDROCHLORIDE 0.5 MILLIGRAM(S): 2 INJECTION INTRAMUSCULAR; INTRAVENOUS; SUBCUTANEOUS at 16:49

## 2020-01-01 RX ADMIN — Medication 5 MILLIGRAM(S): at 22:41

## 2020-01-31 NOTE — ED PROVIDER NOTE - NS ED ROS FT
Constitutional: No fever or chills  Eyes: No visual changes  HEENT: No throat pain  CV: No chest pain  Resp: No SOB no cough  GI: +abd pain. +N/V.   : No dysuria  MSK: No musculoskeletal pain  Skin: No rash  Neuro: No headache

## 2020-01-31 NOTE — H&P ADULT - HISTORY OF PRESENT ILLNESS
64 y/o female with PMH of peritoneal CA with omental mets, ascites presents to the  from Dr Fish office for  admission due to uncontrolled abdominal pain, poor po intake, dehydration. 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 Fish. Abd pain is rated at a 3-4/10 in severity, diffuse , more on the right side of the lower and upper abdomen as well as epigastric. Reports vomiting 2 nights ago. +nausea. Pt has been taking Tylenol and Tramadol with no relief in pain. Pt had lower abd noncon CT on 1/9 and a CT chest on 1/14.     In ED - ) T(F): 98.4 , Max: 98.7 HR: 69(69 - 97)  BP: 122/62  (122/62 - 125/72) RR: 18  (18 - 19) SpO2: 97%  (97% - 98%) EKG - no   WBC 13, Hb 13, Pl 224, Cr 0.9, K 3.5, BUN 12, CT abd - cecal volvulus, 4 mm RML pulmonary nodule, 66 y/o female with PMH of peritoneal CA with omental mets, ascites presents to the  from Dr Fish office for  admission due to uncontrolled abdominal pain, poor po intake, dehydration. 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 Fish. Abd pain is rated at a 3-4/10 in severity, diffuse , more on the right side of the lower and upper abdomen as well as epigastric. Reports vomiting 2 nights ago. +nausea. Pt has been taking Tylenol and Tramadol with no relief in pain. Pt had lower abd noncon CT on 1/9 and a CT chest on 1/14.     In ED - ) T(F): 98.4 , Max: 98.7 HR: 69(69 - 97)  BP: 122/62  (122/62 - 125/72) RR: 18  (18 - 19) SpO2: 97%  (97% - 98%) EKG - sinus LBBB, CXR - wnl, WBC 13, HB 12, Pl 350, Cr 0.8, s/p morphine 4, IV fluids in ED

## 2020-01-31 NOTE — H&P ADULT - NSHPREVIEWOFSYSTEMS_GEN_ALL_CORE
REVIEW OF SYSTEMS:    CONSTITUTIONAL: No weakness, fevers or chills  EYES/ENT: No visual changes;  No vertigo or throat pain   NECK: No pain or stiffness  RESPIRATORY: No cough, wheezing, hemoptysis; No shortness of breath  CARDIOVASCULAR: No chest pain or palpitations  GASTROINTESTINAL: + abdominal + epigastric pain. +  nausea, +  vomiting, no  hematemesis; No diarrhea , +  constipation. No melena or hematochezia.  GENITOURINARY: No dysuria, frequency or hematuria  NEUROLOGICAL: No numbness or weakness  SKIN: No itching, burning, rashes, or lesions   All other review of systems is negative unless indicated above.

## 2020-01-31 NOTE — ED PROVIDER NOTE - NS_ ATTENDINGSCRIBEDETAILS _ED_A_ED_FT
I, Allen Fernandez MD,  performed the initial face to face bedside interview with this patient regarding history of present illness, review of symptoms and relevant past medical, social and family history.  I completed an independent physical examination.  I was the initial provider who evaluated this patient.  The history, relevant review of systems, past medical and surgical history, medical decision making, and physical examination was documented by the scribe in my presence and I attest to the accuracy of the documentation.

## 2020-01-31 NOTE — ED PROVIDER NOTE - OBJECTIVE STATEMENT
66 y/o F with no relevant PMHx presents to the ED sent by MD Fish for admission and pain control. 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 Abd pain is rated at a 3-4/10 in severity. Reports vomiting 2 nights ago. +nausea. Pt has been taking Tylenol and Tramadol with no relief in pain. Pt had lower abd noncon CT on 1/9 and a CT chest on 1/14. Reports that CT chest was negative, but CTAP was "substantial." Denies tobacco use, EtOH use. Allergy: Codeine. PMD: Dr. Cuadra. Oncologist: Dr. Fish. 66 y/o F with no relevant PMHx presents to the ED sent by MD Fish for admission and pain control. 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 Abd pain is rated at a 3-4/10 in severity. Reports vomiting 2 nights ago. +nausea. Pt has been taking Tylenol and Tramadol with no relief in pain. Pt had lower abd noncon CT on 1/9 and a CT chest on 1/14. Reports that CT chest was negative, Denies tobacco use, EtOH use. Allergy: Codeine. PMD: Dr. Cuadra. Oncologist: Dr. Fish.

## 2020-01-31 NOTE — H&P ADULT - ASSESSMENT
64 y/o female with PMH of peritoneal CA with omental mets, ascites presents to the  from Dr Fish office for  admission due to uncontrolled abdominal pain, poor po intake, dehydration. 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 Fish. Abd pain is rated at a 3-4/10 in severity, diffuse , more on the right side of the lower and upper abdomen as well as epigastric. Reports vomiting 2 nights ago. +nausea. Pt has been taking Tylenol and Tramadol with no relief in pain. Pt had lower abd noncon CT on 1/9 and a CT chest on 1/14.     In ED - ) T(F): 98.4 , Max: 98.7 HR: 69(69 - 97)  BP: 122/62  (122/62 - 125/72) RR: 18  (18 - 19) SpO2: 97%  (97% - 98%) EKG - sinus LBBB, CXR - wnl, WBC 13, HB 12, Pl 350, Cr 0.8, s/p morphine 4, IV fluids in ED     * Intractable abdominal pain with poor po intake   * Peritoneal Cancer with omental mets   - med-surg 1 N   - IV fluids  - IV pain medication with antiemetics  - IV PPI   - recent CT abd /chest as o/p , no suspicion for obstruction, pt having bowel movements  - oncology consult Dr. Fish  - plan for inpatient chemo     * Mild leukocytosis  - CXR - clear  - check UA, urine cx  - monitor, can be due to malignancy     * Elevated AP  - ? bone spread vs billiary involvement   - as per oncology  - repeat hepatic panel in am    * Constipation  - c/w miralax, senna    * Hypoalbuminemia due to malnutrition  - ensure    Advanced directives  - spend 17 min  - FULL code    DVT proph - lovenox   IMPROVE VTE Individual Risk Assessment  RISK                                                                Points  [  ] Previous VTE                                                  3  [  ] Thrombophilia                                               2  [  ] Lower limb paralysis                                      2        (unable to hold up >15 seconds)    [  ] Current Cancer                                              2         (within 6 months)  [  ] Immobilization > 24 hrs                                1  [  ] ICU/CCU stay > 24 hours                              1  [  x] Age > 60                                                      1  IMPROVE VTE Score ______1___    IMPROVE Score 0-1: Low Risk, No VTE prophylaxis required for most patients, encourage ambulation.   IMPROVE Score 2-3: At risk, pharmacologic VTE prophylaxis is indicated for most patients (in the absence of a contraindication)  IMPROVE Score > or = 4: High Risk, pharmacologic VTE prophylaxis is indicated for most patients (in the absence of a contraindication)    Time spend 75 minutes

## 2020-01-31 NOTE — ED ADULT TRIAGE NOTE - IDEAL BODY WEIGHT(KG)
"Pt lying in bed, call bell within reach, respirations even, unlabored, eyes open spontaneously, NAD noted. Pt states"i am feeling much better." will continue to monitor with cardiac monitor, pulse ox, and BP cycling.  " 64

## 2020-01-31 NOTE — ED ADULT NURSE NOTE - OBJECTIVE STATEMENT
Patient comes in with generalized abd pain and nausea. Patient has stage IV stomach ca. Patient states she was sent to ED for hydration and pain management / admission for chemo. Patient currently denying chest pain, sob.

## 2020-01-31 NOTE — H&P ADULT - NSHPPHYSICALEXAM_GEN_ALL_CORE
PHYSICAL EXAM:    Constitutional: NAD, awake and alert, well-developed  HEENT: PERR, EOMI, Normal Hearing, MMM  Neck: Soft and supple, No LAD, No JVD  Respiratory: Breath sounds decreased at bases,  No wheezing, rales or rhonchi  Cardiovascular: S1 and S2, regular rate and rhythm, no Murmurs, gallops or rubs  Gastrointestinal: Bowel Sounds present, soft, + diffuse tenderness, mildly distended, no guarding, no rebound  Extremities: No peripheral edema  Vascular: 2+ peripheral pulses  Neurological: A/O x 3, no focal deficits  Musculoskeletal: 5/5 strength b/l upper and lower extremities  Skin: No rashes  rectal : deferred, not indicated

## 2020-01-31 NOTE — ED PROVIDER NOTE - PROGRESS NOTE DETAILS
Dr. Avalos sent pt over to have IV hydration and admission to . pt endorsed to Dr. Dsouza accepts. Allen Fernandez M.D., Attending Physician

## 2020-01-31 NOTE — ED PROVIDER NOTE - PHYSICAL EXAMINATION
Constitutional: NAD AAOx3  Eyes: PERRLA EOMI  Head: Normocephalic atraumatic  Mouth: MMM  Cardiac: regular rate   Resp: Lungs CTAB  GI: +diffuse abd TTP.   Neuro: CN2-12 intact  Skin: No rashes

## 2020-01-31 NOTE — ED ADULT TRIAGE NOTE - CHIEF COMPLAINT QUOTE
Pt dx with stage iv stomach ca, needs to be seen in the ER and have pain under control and hydration prior to begin able to start chemo. Pt sent in by dr. george

## 2020-01-31 NOTE — ED ADULT NURSE NOTE - NSIMPLEMENTINTERV_GEN_ALL_ED
Implemented All Universal Safety Interventions:  [place] to call system. Call bell, personal items and telephone within reach. Instruct patient to call for assistance. Room bathroom lighting operational. Non-slip footwear when patient is off stretcher. Physically safe environment: no spills, clutter or unnecessary equipment. Stretcher in lowest position, wheels locked, appropriate side rails in place.

## 2020-01-31 NOTE — ED PROVIDER NOTE - CLINICAL SUMMARY MEDICAL DECISION MAKING FREE TEXT BOX
65 F recently diagnosed with ovarian CA with mets to abd presents to ED for abd pain. Sent by MD Fish for hydration and chemo tomorrow. Exam with mild diffuse TTP. Will obtain labs, pain control, admit.

## 2020-02-01 NOTE — PROGRESS NOTE ADULT - SUBJECTIVE AND OBJECTIVE BOX
receiving chemo, no c/o    Vital Signs Last 24 Hrs  T(C): 37.1 (01 Feb 2020 14:00), Max: 37.3 (31 Jan 2020 21:21)  T(F): 98.7 (01 Feb 2020 14:00), Max: 99.1 (31 Jan 2020 21:21)  HR: 74 (01 Feb 2020 14:00) (69 - 91)  BP: 127/62 (01 Feb 2020 14:00) (122/62 - 133/60)  BP(mean): --  RR: 16 (01 Feb 2020 14:00) (16 - 18)  SpO2: 94% (01 Feb 2020 14:00) (94% - 97%)    heent nc

## 2020-02-01 NOTE — CONSULT NOTE ADULT - SUBJECTIVE AND OBJECTIVE BOX
Patient is a 65y old  Female who presents with a chief complaint of abdominal pain, poor po intake (01 Feb 2020 14:54)      HPI:  66 y/o female with PMH of primary peritoneal CA with omental mets, ascites presents to the  from Dr Fish office for  admission due to uncontrolled abdominal pain, poor po intake, dehydration. 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 Fish.   patient has been hydrated overnight and is currently receiving chemotherapy with taxol which she is tolerating well. carboplatin to be administered later today  s/o pain in right lower quadrant requiring morphine q 4 hrs      PAST MEDICAL & SURGICAL HISTORY:  Constipation  Peritoneal carcinoma  Omental metastasis  No significant past surgical history      REVIEW OF SYSTEMS    General:	  abdominal pain  weakness  no shortness of breath  no nausea vomiting      Allergies    codeine (Unknown)  mineral oil (Rash)  shellfish (Unknown)    Intolerances          FAMILY HISTORY:  FH: heart attack (Father)  uncle with prostate cancer      Home Medications:  acetaminophen 325 mg oral tablet: 2 tab(s) orally every 6 hours, As needed, Moderate Pain (4 - 6) (31 Jan 2020 17:01)  Colace 100 mg oral capsule: 1 cap(s) orally once a day (31 Jan 2020 17:01)  MiraLax oral powder for reconstitution: 17 gram(s) orally once a day (31 Jan 2020 17:01)  Senna 8.6 mg oral tablet: 2 tab(s) orally once a day (at bedtime) (31 Jan 2020 17:01)  traMADol 50 mg oral tablet: 1 tab(s) orally every 6 hours, As Needed (31 Jan 2020 17:01)      MEDICATIONS  (STANDING):  CARBOplatin IVPB (eMAR) 653 milliGRAM(s) IV Intermittent once  enoxaparin Injectable 40 milliGRAM(s) SubCutaneous daily  fentaNYL   Patch  12 MICROgram(s)/Hr 1 Patch Transdermal every 72 hours  multivitamin 1 Tablet(s) Oral daily  pantoprazole  Injectable 40 milliGRAM(s) IV Push daily  polyethylene glycol 3350 17 Gram(s) Oral daily  senna 2 Tablet(s) Oral at bedtime  sodium chloride 0.9%. 1000 milliLiter(s) (75 mL/Hr) IV Continuous <Continuous>    MEDICATIONS  (PRN):  acetaminophen   Tablet .. 650 milliGRAM(s) Oral every 6 hours PRN Temp greater or equal to 38C (100.4F), Mild Pain (1 - 3)  aluminum hydroxide/magnesium hydroxide/simethicone Suspension 30 milliLiter(s) Oral every 4 hours PRN Dyspepsia  bisacodyl Suppository 10 milliGRAM(s) Rectal daily PRN Constipation  HYDROmorphone   Tablet 2 milliGRAM(s) Oral every 4 hours PRN Moderate Pain (4 - 6)  morphine  - Injectable 4 milliGRAM(s) IV Push every 4 hours PRN Severe Pain (7 - 10)  ondansetron Injectable 4 milliGRAM(s) IV Push every 8 hours PRN Nausea and/or Vomiting  sodium chloride 0.65% Nasal 1 Spray(s) Both Nostrils every 6 hours PRN Nasal Congestion  zolpidem 5 milliGRAM(s) Oral at bedtime PRN Insomnia      PHYSICAL EXAM:  Vital Signs Last 24 Hrs  T(C): 37.1 (01 Feb 2020 14:00), Max: 37.3 (31 Jan 2020 21:21)  T(F): 98.7 (01 Feb 2020 14:00), Max: 99.1 (31 Jan 2020 21:21)  HR: 74 (01 Feb 2020 14:00) (74 - 91)  BP: 127/62 (01 Feb 2020 14:00) (125/63 - 133/60)  BP(mean): --  RR: 16 (01 Feb 2020 14:00) (16 - 18)  SpO2: 94% (01 Feb 2020 14:00) (94% - 95%)      Gen:  appears tired  comfortable now  chest clear  abdomen soft   tender right lower quadrant        LABS:                        11.3   11.32 )-----------( 310      ( 01 Feb 2020 07:14 )             34.8     01 Feb 2020 07:14    138    |  106    |  16     ----------------------------<  90     4.6     |  27     |  0.64     Ca    8.8        01 Feb 2020 07:14  Phos  3.1       01 Feb 2020 07:14  Mg     1.8       01 Feb 2020 07:14    TPro  6.1    /  Alb  2.0    /  TBili  0.3    /  DBili  x      /  AST  26     /  ALT  26     /  AlkPhos  175    01 Feb 2020 07:14    LIVER FUNCTIONS - ( 01 Feb 2020 07:14 )  Alb: 2.0 g/dL / Pro: 6.1 gm/dL / ALK PHOS: 175 U/L / ALT: 26 U/L / AST: 26 U/L / GGT: x           PT/INR - ( 31 Jan 2020 13:01 )   PT: 14.3 sec;   INR: 1.28 ratio         PTT - ( 31 Jan 2020 13:01 )  PTT:35.1 sec       242      RADIOLOGY & ADDITIONAL STUDIES:    CT 1/9/20  large soft tissue mass with calcification right hemipelvis inseparable from the uterus 5.2x6.2 cm  moderate ascites  omental caking  thickening of small bowel  several small lung nodule

## 2020-02-01 NOTE — CONSULT NOTE ADULT - PROBLEM SELECTOR RECOMMENDATION 9
primary peritoneal ca  receiving carboplatin and taxol  next chemo due in 3 weeks    shall need mediport prior to next chemo, shall as IR if this can be done this admission

## 2020-02-02 NOTE — PROGRESS NOTE ADULT - SUBJECTIVE AND OBJECTIVE BOX
receiving chemo, no c/o    Vital Signs Last 24 Hrs  T(C): 37 (02 Feb 2020 12:57), Max: 37.3 (01 Feb 2020 18:20)  T(F): 98.6 (02 Feb 2020 12:57), Max: 99.1 (01 Feb 2020 18:20)  HR: 72 (02 Feb 2020 12:57) (71 - 85)  BP: 134/58 (02 Feb 2020 12:57) (120/59 - 134/58)  BP(mean): --  RR: 16 (02 Feb 2020 12:57) (16 - 18)  SpO2: 96% (02 Feb 2020 12:57) (93% - 97%)    heent nc at per[facility]la  chest cta  cvs s1s2 reg no m/g/r  abd soft mild lower and tenderness +bs  extr no e/c/c  neuro nonfocal

## 2020-02-02 NOTE — PROGRESS NOTE ADULT - PROBLEM SELECTOR PLAN 1
s/p chemo   next cycle in 3 weeks  shall need mediport but  I DW Dr Smith who would prefer to put port in as outpatient due to concerns for infections with inpatient port  pain management  home once symptoms imrpove

## 2020-02-02 NOTE — PROGRESS NOTE ADULT - SUBJECTIVE AND OBJECTIVE BOX
66 y/o female with PMH of primary peritoneal CA with omental mets, ascites presents to the  from Dr Fish office for  admission due to uncontrolled abdominal pain, poor po intake, dehydration. 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 Fish.   patient has been hydrated overnight and is currently receiving chemotherapy with taxol and carboplatin which she tolerated well.     C/o pain in right lower quadrant requiring morphine q 4 hrs.   also started on fentanyl 12 mg    had profuse sweating yesterday without any fevers      PAST MEDICAL & SURGICAL HISTORY:  Constipation  Peritoneal carcinoma  Omental metastasis  No significant past surgical history      REVIEW OF SYSTEMS    General:	  abdominal pain  constipation  sweats  weakness  no shortness of breath  no nausea vomiting        MEDICATIONS  (STANDING):  aprepitant 80 milliGRAM(s) Oral daily  enoxaparin Injectable 40 milliGRAM(s) SubCutaneous daily  fentaNYL   Patch  12 MICROgram(s)/Hr 1 Patch Transdermal every 72 hours  multivitamin 1 Tablet(s) Oral daily  pantoprazole  Injectable 40 milliGRAM(s) IV Push daily  polyethylene glycol 3350 17 Gram(s) Oral daily  senna 2 Tablet(s) Oral at bedtime  sodium chloride 0.9%. 1000 milliLiter(s) (75 mL/Hr) IV Continuous <Continuous>    MEDICATIONS  (PRN):  acetaminophen   Tablet .. 650 milliGRAM(s) Oral every 6 hours PRN Temp greater or equal to 38C (100.4F), Mild Pain (1 - 3)  aluminum hydroxide/magnesium hydroxide/simethicone Suspension 30 milliLiter(s) Oral every 4 hours PRN Dyspepsia  bisacodyl Suppository 10 milliGRAM(s) Rectal daily PRN Constipation  HYDROmorphone   Tablet 2 milliGRAM(s) Oral every 4 hours PRN Moderate Pain (4 - 6)  morphine  - Injectable 4 milliGRAM(s) IV Push every 4 hours PRN Severe Pain (7 - 10)  ondansetron Injectable 4 milliGRAM(s) IV Push every 8 hours PRN Nausea and/or Vomiting  sodium chloride 0.65% Nasal 1 Spray(s) Both Nostrils every 6 hours PRN Nasal Congestion  zolpidem 5 milliGRAM(s) Oral at bedtime PRN Insomnia      Vital Signs Last 24 Hrs  T(C): 37 (02 Feb 2020 05:55), Max: 37.3 (01 Feb 2020 18:20)  T(F): 98.6 (02 Feb 2020 05:55), Max: 99.1 (01 Feb 2020 18:20)  HR: 75 (02 Feb 2020 05:55) (71 - 91)  BP: 120/59 (02 Feb 2020 05:55) (120/59 - 133/60)  BP(mean): --  RR: 18 (01 Feb 2020 20:33) (16 - 18)  SpO2: 97% (02 Feb 2020 05:55) (93% - 97%)      Gen:  appears tired  comfortable now  chest clear  abdomen soft   tender right lower quadrant        LABS:                        11.3   11.32 )-----------( 310      ( 01 Feb 2020 07:14 )             34.8     01 Feb 2020 07:14    138    |  106    |  16     ----------------------------<  90     4.6     |  27     |  0.64     Ca    8.8        01 Feb 2020 07:14  Phos  3.1       01 Feb 2020 07:14  Mg     1.8       01 Feb 2020 07:14    TPro  6.1    /  Alb  2.0    /  TBili  0.3    /  DBili  x      /  AST  26     /  ALT  26     /  AlkPhos  175    01 Feb 2020 07:14    LIVER FUNCTIONS - ( 01 Feb 2020 07:14 )  Alb: 2.0 g/dL / Pro: 6.1 gm/dL / ALK PHOS: 175 U/L / ALT: 26 U/L / AST: 26 U/L / GGT: x           PT/INR - ( 31 Jan 2020 13:01 )   PT: 14.3 sec;   INR: 1.28 ratio         PTT - ( 31 Jan 2020 13:01 )  PTT:35.1 sec       242      RADIOLOGY & ADDITIONAL STUDIES:    CT 1/9/20  large soft tissue mass with calcification right hemipelvis inseparable from the uterus 5.2x6.2 cm  moderate ascites  omental caking  thickening of small bowel  several small lung nodule

## 2020-02-03 NOTE — DISCHARGE NOTE PROVIDER - CARE PROVIDER_API CALL
Jesus Fish)  Hematology; Internal Medicine; Medical Oncology  [address] [place] [address]  Phone: [phone]  Fax: [phone]  Follow Up Time:     Teresa Gonzales)  Internal Medicine  [address] [place] [address]  Phone: [phone]  Fax: [phone]  Follow Up Time:

## 2020-02-03 NOTE — DISCHARGE NOTE NURSING/CASE MANAGEMENT/SOCIAL WORK - PATIENT PORTAL LINK FT
You can access the FollowMyHealth Patient Portal offered by [facility] by registering at the following website: [email or web address]. By joining "[employer]"’s FollowMyHealth portal, you will also be able to view your health information using other applications (apps) compatible with our system.

## 2020-02-03 NOTE — PROGRESS NOTE ADULT - PROBLEM SELECTOR PLAN 3
home on dialaudid and fentanyl with bowel regimen  shall likely need to see pain management at our office

## 2020-02-03 NOTE — PROGRESS NOTE ADULT - PROBLEM SELECTOR PLAN 1
s/p chemo   next cycle in 3 weeks  shall need mediport but  I DW Dr Smith who would prefer to put port in as outpatient due to concerns for infections with inpatient port  pain management  Plan for home today  rec  FU with Dr Fish tomorrow for possible IVF and pain management

## 2020-02-03 NOTE — DISCHARGE NOTE PROVIDER - NSDCCPCAREPLAN_GEN_ALL_CORE_FT
PRINCIPAL DISCHARGE DIAGNOSIS  Diagnosis: Abdominal pain  Assessment and Plan of Treatment: f/up with oncology

## 2020-02-03 NOTE — DISCHARGE NOTE PROVIDER - HOSPITAL COURSE
64 y/o female with PMH of primary peritoneal CA with omental mets, ascites presents to the  from Dr Fish office for  admission due to uncontrolled abdominal pain, poor po intake, dehydration. 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 Fish.     patient has been hydrated overnight and is currently receiving chemotherapy with taxol and carboplatin which she tolerated well.         s/p chemo    abd pain- c/w prn analgesia    will see onc as an outpt

## 2020-02-03 NOTE — PROGRESS NOTE ADULT - REASON FOR ADMISSION
abdominal pain, poor po intake

## 2020-02-03 NOTE — PROGRESS NOTE ADULT - SUBJECTIVE AND OBJECTIVE BOX
remains somewhat uncomfortable  c/o abdominal pains  to be Dced home today  tolerating po      PAST MEDICAL & SURGICAL HISTORY:  Constipation  Peritoneal carcinoma  Omental metastasis  No significant past surgical history      REVIEW OF SYSTEMS    General:	  abdominal pain  constipation  sweats  weakness  no shortness of breath  no nausea vomiting        MEDICATIONS  (STANDING):  aprepitant 80 milliGRAM(s) Oral daily  enoxaparin Injectable 40 milliGRAM(s) SubCutaneous daily  fentaNYL   Patch  12 MICROgram(s)/Hr 1 Patch Transdermal every 72 hours  multivitamin 1 Tablet(s) Oral daily  nystatin    Suspension [id number] Unit(s) Oral every 8 hours  pantoprazole  Injectable 40 milliGRAM(s) IV Push daily  polyethylene glycol 3350 17 Gram(s) Oral daily  senna 2 Tablet(s) Oral at bedtime  sodium chloride 0.9%. 1000 milliLiter(s) (75 mL/Hr) IV Continuous <Continuous>    MEDICATIONS  (PRN):  acetaminophen   Tablet .. 650 milliGRAM(s) Oral every 6 hours PRN Temp greater or equal to 38C (100.4F), Mild Pain (1 - 3)  aluminum hydroxide/magnesium hydroxide/simethicone Suspension 30 milliLiter(s) Oral every 4 hours PRN Dyspepsia  bisacodyl Suppository 10 milliGRAM(s) Rectal daily PRN Constipation  HYDROmorphone   Tablet 2 milliGRAM(s) Oral every 4 hours PRN Moderate Pain (4 - 6)  morphine  - Injectable 4 milliGRAM(s) IV Push every 4 hours PRN Severe Pain (7 - 10)  ondansetron Injectable 4 milliGRAM(s) IV Push every 8 hours PRN Nausea and/or Vomiting  sodium chloride 0.65% Nasal 1 Spray(s) Both Nostrils every 6 hours PRN Nasal Congestion  zolpidem 5 milliGRAM(s) Oral at bedtime PRN Insomnia        Vital Signs Last 24 Hrs  T(C): 36.6 (03 Feb 2020 03:40), Max: 37 (02 Feb 2020 12:57)  T(F): 97.9 (03 Feb 2020 03:40), Max: 98.6 (02 Feb 2020 12:57)  HR: 65 (03 Feb 2020 03:40) (65 - 72)  BP: 124/57 (03 Feb 2020 03:40) (124/57 - 134/58)  BP(mean): --  RR: 18 (03 Feb 2020 03:40) (16 - 18)  SpO2: 94% (03 Feb 2020 03:40) (94% - 96%)      Gen:  appears tired  comfortable now  chest clear  abdomen soft   tender right lower quadrant           PTT - ( 31 Jan 2020 13:01 )  PTT:35.1 sec       242      RADIOLOGY & ADDITIONAL STUDIES:    CT 1/9/20  large soft tissue mass with calcification right hemipelvis inseparable from the uterus 5.2x6.2 cm  moderate ascites  omental caking  thickening of small bowel  several small lung nodule

## 2020-02-03 NOTE — DISCHARGE NOTE PROVIDER - NSDCMRMEDTOKEN_GEN_ALL_CORE_FT
acetaminophen 325 mg oral tablet: 2 tab(s) orally every 6 hours, As needed, Moderate Pain (4 - 6)  Colace 100 mg oral capsule: 1 cap(s) orally once a day  fentaNYL 12 mcg/hr transdermal film, extended release: 1 patch transdermal every 72 hours MDD:1 q 3 days  HYDROmorphone 2 mg oral tablet: 1 tab(s) orally every 4 hours, As needed, Moderate Pain (4 - 6) MDD:6tb  MiraLax oral powder for reconstitution: 17 gram(s) orally once a day  Senna 8.6 mg oral tablet: 2 tab(s) orally once a day (at bedtime)  Zofran ODT 4 mg oral tablet, disintegratin tab(s) orally 3 times a day  PRn as needed

## 2020-02-04 PROBLEM — C48.2 MALIGNANT NEOPLASM OF PERITONEUM, UNSPECIFIED: Chronic | Status: ACTIVE | Noted: 2020-01-01

## 2020-02-04 PROBLEM — K59.00 CONSTIPATION, UNSPECIFIED: Chronic | Status: ACTIVE | Noted: 2020-01-01

## 2020-02-04 PROBLEM — C78.6 SECONDARY MALIGNANT NEOPLASM OF RETROPERITONEUM AND PERITONEUM: Chronic | Status: ACTIVE | Noted: 2020-01-01

## 2020-02-04 NOTE — H&P ADULT - HISTORY OF PRESENT ILLNESS
64 y/o female with PMHx of advanced ovarian cancer, constipation, omental metastasis, peritoneal carcinoma presents to the ED sent by Dr. Fish for CT and for concern for dehydration. Pt reports abdominal distension, diffuse abdominal pain, unable to tolerate PO, SOB. Unable to walk long distances without getting winded. Abdominal pain is now a 5/10 in severity but spikes. Was just seen at the infusion center, had labs done and a saline drip but was told to still come to the ED. She has diffuse abdominal pain, associated with nausea, not passing gas. No fever. Never had a colonoscopy. Has not been eating well for last 15 days. Anorexia. Last  chemotherapy 2/1/20. No recent sick contacts. No recent travel.

## 2020-02-04 NOTE — ED ADULT NURSE NOTE - NSIMPLEMENTINTERV_GEN_ALL_ED
Implemented All Fall with Harm Risk Interventions:  [place] to call system. Call bell, personal items and telephone within reach. Instruct patient to call for assistance. Room bathroom lighting operational. Non-slip footwear when patient is off stretcher. Physically safe environment: no spills, clutter or unnecessary equipment. Stretcher in lowest position, wheels locked, appropriate side rails in place. Provide visual cue, wrist band, yellow gown, etc. Monitor gait and stability. Monitor for mental status changes and reorient to person, place, and time. Review medications for side effects contributing to fall risk. Reinforce activity limits and safety measures with patient and family. Provide visual clues: red socks.

## 2020-02-04 NOTE — H&P ADULT - NSHPPHYSICALEXAM_GEN_ALL_CORE
Vitals:   04-Feb-2020 15:30  · Temp (F)	98.6  · Temp (C) Temp (C)	37  · Temp site Temp Site	oral  · Heart Rate Heart Rate (beats/min)	88  · BP Systolic Systolic	132  · BP Diastolic Diastolic (mm Hg)	85  · Respiration Rate (breaths/min) Respiration Rate (breaths/min)	18  · SpO2 (%) SpO2 (%)	97  · O2 delivery Patient On	room air    PHYSICAL EXAM:  Constitutional: NAD, GCS: 15/15, weak, malnourished.  AOX3  Eyes:  WNL  ENMT:  WNL  Neck:  WNL, non tender  Back: Non tender  Respiratory: CTABL  Cardiovascular:  S1+S2+0  Gastrointestinal: Soft, mild distension, mild diffuse tenderness o rebound or guarding.   Genitourinary:  WNL  Extremities: NV intact  Vascular:  Intact  Neurological: No focal neurological deficit,  CN, motor and sensory system grossly intact.  Skin: WNL  Musculoskeletal: WNL  Psychiatric: Grossly WNL

## 2020-02-04 NOTE — H&P ADULT - NSICDXPASTMEDICALHX_GEN_ALL_CORE_FT
PAST MEDICAL HISTORY:  Constipation     Omental metastasis     Ovarian cancer     Peritoneal carcinoma

## 2020-02-04 NOTE — ED ADULT NURSE NOTE - OBJECTIVE STATEMENT
Patient presents to ED with abdominal pain and distension.  Hx of Ovarian Cancer.  Patient was discharged home 2/3/20 after an admission for dehydration, abdominal distension.  Patient here with increasing abdominal pain and inability to take in po without feeling nauseated or vomiting.  abdomen is distended with generalized tenderness to mild palpation and decreased bowel sounds in all 4 quadrants.  No blood work to be drawn as per MD Lomeli Patient brought results from blood work drawn 2 hours ago.

## 2020-02-04 NOTE — ED STATDOCS - ATTENDING CONTRIBUTION TO CARE
I, Hernesto Lomeli MD,  performed the initial face to face bedside interview with this patient regarding history of present illness, review of symptoms and relevant past medical, social and family history.  I completed an independent physical examination.  I was the initial provider who evaluated this patient. I have signed out the follow up of any pending tests (i.e. labs, radiological studies) to the ACP.  I have communicated the patient’s plan of care and disposition with the ACP.  The history, relevant review of systems, past medical and surgical history, medical decision making, and physical examination was documented by the scribe in my presence and I attest to the accuracy of the documentation.

## 2020-02-04 NOTE — H&P ADULT - NSHPLABSRESULTS_GEN_ALL_CORE
Labs:                          10.8   4.64  )-----------( 245      ( 05 Feb 2020 07:54 )             34.3       02-06    138  |  108  |  26<H>  ----------------------------<  145<H>  4.1   |  25  |  0.75    Ca    8.0<L>      06 Feb 2020 08:37  Phos  2.1     02-06  Mg     1.8     02-06    TPro  5.9<L>  /  Alb  2.0<L>  /  TBili  0.2  /  DBili  x   /  AST  52<H>  /  ALT  75  /  AlkPhos  117  02-06      PT/INR - ( 04 Feb 2020 18:32 )   PT: 12.6 sec;   INR: 1.13 ratio         PTT - ( 04 Feb 2020 18:32 )  PTT:28.9 sec    Lactate, Blood: 1.4 mmol/L (02.04.20 @ 18:32)

## 2020-02-04 NOTE — ED STATDOCS - OBJECTIVE STATEMENT
64 y/o female with PMHx of advanced ovarian cancer, constipation, omental metastasis, peritoneal carcinoma presents to the ED sent by Dr. Fish for CT and for concern for dehydration. Pt reports abdominal distension, diffuse abdominal pain, unable to tolerate PO, SOB. Unable to walk long distances without getting winded. Abdominal pain is now a 5/10 in severity but spikes. Was just seen at the infusion center, had labs done and a saline drip but was told to still come to the ED. Has Fentanyl patch on the left side. Last CT abdomen 1/9/20. Last chemotherapy 2/1/20. No recent sick contacts. No recent travel. Onc: Abe. PMD: Venecia.

## 2020-02-04 NOTE — ED STATDOCS - ENMT, MLM
Nasal mucosa clear. Throat has no vesicles, no oropharyngeal exudates and uvula is midline. +dry mucous membranes

## 2020-02-04 NOTE — H&P ADULT - ASSESSMENT
65 y old female with advance ovarian CA, with malignant SBO, malnourished    NPO  IV hydration   NGT to LWS  Serial abdominal exams  DVT/GI prophylaxis  Will start malignant CARMEN protocol, with steroids and octreotide  SBS in am  medical consult  Oncology consult  Pt aware she may need surgical intervention she was also made aware in advance carcinomatosis surgery can be complex she agrees with plan to manage conservatively for now

## 2020-02-04 NOTE — ED ADULT NURSE REASSESSMENT NOTE - NS ED NURSE REASSESS COMMENT FT1
Received patient from [facility]. Pt A&Ox4 complaining of nausea and abdominal pain. Pt medicated as per MD orders. Pt to have NGTube placed. Pt concerned with having tube placed secondary to having nose bleed 2 years ago where she lost 1.5 pints of blood out of her right nostril and was admitted to the hospital for a week. Called surgical resident to come place the NGTube secondary to patients concerns and history and that I as the RN feel uncomfortable placing tube.  Surgical resident at this time states "It will be a long time before I can come down to place an NGTube". Notified MAINE Rosado. Pt resting in bed with  at bedside. Comfort and safety maintained.
PA Mandiberg to update patient and family as to plan of care.  Will continue to monitor.
Pt alert and oriented x3 VSS afebrile. Pt c/o abd pain 3/10, medicated as ordered. Pt given evening meds. Pt uncomfortable on stretcher-attempted reposition, uncomfortable. Transfer to floor delayed due to terminal clean needed, RN will get hospital bed for patient due to length of stay in ER. NGT to LCWS drainging small amount of dark brown output. Pt OOB to commode to void. Wait to explained. All needs addressed and safety maintained. Call bell in reach.

## 2020-02-04 NOTE — ED ADULT TRIAGE NOTE - CHIEF COMPLAINT QUOTE
Patient comes in with abdominal pain and decrease PO intake for "weeks". As per , patient needs a CT scan and to be admitted sent by MD Fish. Patient also states she is SOB. Patient has ovarian ca.

## 2020-02-04 NOTE — H&P ADULT - NSHPREVIEWOFSYSTEMS_GEN_ALL_CORE
ROS:.  [X] A ten-point review of systems was otherwise negative except as noted in HPI  Systemic:	[ ] Fever	[ ] Chills	[ ] Night sweats    [ ] Fatigue	[ ] Other  [] Cardiovascular:  [] Pulmonary:  [] Renal/Urologic:  [] Gastrointestinal: abdominal pain, vomiting  [] Metabolic:  [] Neurologic:  [] Hematologic:  [] ENT:  [] Ophthalmologic:  [] Musculoskeletal:    [ ] Due to altered mental status/intubation, subjective information were not able to be obtained from the patient. History was obtained, to the extent possible, from review of the chart and collateral sources of information.

## 2020-02-04 NOTE — ED STATDOCS - CARE PLAN
Principal Discharge DX:	SBO (small bowel obstruction)  Secondary Diagnosis:	Omental metastasis  Secondary Diagnosis:	Ovarian cancer  Secondary Diagnosis:	Peritoneal carcinoma

## 2020-02-05 NOTE — DIETITIAN INITIAL EVALUATION ADULT. - PHYSICAL APPEARANCE
other (specify) NFPE significant for mild muscle wasting: temporal, clavicle.  mild fat wasting; orbital and tricep.  no edema.  mauricio score of 19, no PU noted.

## 2020-02-05 NOTE — DIETITIAN INITIAL EVALUATION ADULT. - MALNUTRITION
moderate malnutrition in acute on chronic illness r/t altered GI function AEB 3% wt loss x 1 mo; mild muscle/fat wasting; meeting <50% of ENN x 1 month. moderate malnutrition in acute on chronic illness

## 2020-02-05 NOTE — CHART NOTE - NSCHARTNOTEFT_GEN_A_CORE
Upon Nutritional Assessment by the Registered Dietitian your patient was determined to meet criteria / has evidence of the following diagnosis/diagnoses:          [ ]  Mild Protein Calorie Malnutrition        [x]  Moderate Protein Calorie Malnutrition        [ ] Severe Protein Calorie Malnutrition        [ ] Unspecified Protein Calorie Malnutrition        [ ] Underweight / BMI <19        [ ] Morbid Obesity / BMI > 40      Findings:  moderate malnutrition in acute on chronic illness r/t altered GI function AEB 3% wt loss x 1 mo; mild muscle/fat wasting; meeting <50% of ENN x 1 month    Findings as based on:  •  Comprehensive nutrition assessment and consultation  •  Calorie counts (nutrient intake analysis)  •  Food acceptance and intake status from observations by staff  •  Follow up  •  Patient education  •  Intervention secondary to interdisciplinary rounds  •   concerns      Treatment:    The following diet has been recommended:  see PPN recommendations note for complete recommendations    PROVIDER Section:     By signing this assessment you are acknowledging and agree with the diagnosis/diagnoses assigned by the Registered Dietitian    Comments:

## 2020-02-05 NOTE — DIETITIAN INITIAL EVALUATION ADULT. - OTHER INFO
66yo female with PMH significant for constipation, peritoneal carcinoma, omental metasis with ascites.  Pt p/w abd pain, nausea, and reflux symptoms.  CT abdomen and pelvis shows high grade obstruction in small bowel.  s/p NGT placement.  Pt with advanced ovarian cancer on chemo.  Prior to current issues that started in November, pt has been trying to lose with calorie counting: pt had lost ~30# (13%) in 10 months; not sig.

## 2020-02-05 NOTE — PROGRESS NOTE ADULT - ASSESSMENT
Impression: 64 yo female admitted with nausea and poor po intake with recent diagnose of stage III C Ovarian cancer, R pelvic mass/ omental metastases s/p taxol and carb x1.  Now with small bowel obstruction.  Clinically improving with NGT in place, + BS, no gas or BM reported today.

## 2020-02-05 NOTE — CONSULT NOTE ADULT - SUBJECTIVE AND OBJECTIVE BOX
HPI:  66 y/o female with recent of primary peritoneal CA with omental mets, ascites referred  to us last week by Dr Jean Claude Rinaldi ( [place], GYN/ ONC) for neoadjuvant therapy/ Initial scan 1/20 had revealed right pelvic mass/ omental metastases / small bowel narrowing; She had limited PO intake/ dehydration / abdominal pain when we saw her and she was therefore admitted for chemotherapy/ Had Taxol Carboplatin 2/ 1/20 ; had hydration, DC home on pain medications; We saw her in FU 2/4 and she had increasing abdominal pains,  nausea, reflux symptoms; on exam was distended,  abdominal tenderness; advised readmission and CT abdomen and pelvis: reveals high grade obstruction , small bowel; seen by Surgery, Dr Chau / NGT has been placed and she is feeling much improved; has not required pain medications since early this AM       PAST MEDICAL & SURGICAL HISTORY:  Constipation  Peritoneal carcinoma  Omental metastasis  No significant past surgical history      REVIEW OF SYSTEMS    General:	  abdominal pain  weakness  no shortness of breath  no nausea vomiting      Allergies    PHYSICAL EXAM:      Constitutional: Appears comfortable, in  NAD, A/O X 3  NGT in place     Eyes: EOMI, PERRLA ;No icterus    ENMT:  No oral lesions, thrush; pharynx not injected    Neck:  Supple; No masses, lymph nodes, no bruits    Breasts: NA    Back: No tenderness     Respiratory:  Clear to A/P, No wheezes, rhonchi, rales. No dullness to percussion    Cardiovascular: Normal rate and rhythm; normal S1 and S2; No murmurs. No gallop    Gastrointestinal: less  distension, + bowl sounds; No tenderness , guarding, rebound;  no masses, no hepatosplenomegaly     Genitourinary: No flank pains, no inguinal adenopathy    Rectal: NA    Extremities:  No phlebitis, no edema    Vascular: No acrocyanosis, no edema    Neurological: Alert and oriented. Cranial nerves II-XII WNL, Upper extremity / lower extremity  strength WNL;  Reflexes WNL, Plantar downgoing ; No cerebellar signs    Skin: No rash, petechiae purpura, ecchymoses    Lymph Nodes: No cervical, supraclavicular, axillary, inguinal adenopathy    Musculoskeletal: No joint swelling    Psychiatric: Alert, oriented, normal affect      < from: CT Abdomen and Pelvis w/ IV Cont (02.04.20 @ 17:24) >  EXAM:  CT ABDOMEN AND PELVIS IC                            PROCEDURE DATE:  02/04/2020          INTERPRETATION:  CLINICAL INFORMATION: Diffuse abdominal pain. Ovarian cancer.    COMPARISON: None.    PROCEDURE:   CT of the Abdomen and Pelvis was performed with intravenous contrast.   Intravenous contrast: 90 ml Omnipaque 350. 10 ml discarded.  Oral contrast: None.  Sagittal and coronal reformats were performed.    FINDINGS:    LOWER CHEST: Within normal limits.    LIVER: Subcapsular hepatic implants, measuring up to 1.5 x 1.4 cm mass along the posterior right hepatic lobe.  BILE DUCTS: Normal caliber.  GALLBLADDER: Within normal limits.  SPLEEN: Within normal limits.  PANCREAS: Within normal limits.  ADRENALS: Within normal limits.  KIDNEYS/URETERS: Left parapelvic cysts. Probable left renal cysts.    BLADDER: Within normal limits.  REPRODUCTIVE ORGANS: Calcified right uterine leiomyoma. Heterogeneous appearance of the uterus. Cystic/solid right adnexal lesion, measuring 2.5 cm.    BOWEL: High-grade small bowel obstruction, with transition point in the anterior left lower abdomen at the site subserosal peritoneum disease. Colonic diverticulosis.  PERITONEUM: Peritoneal carcinomatosis. Small volume abdominopelvic ascites.  VESSELS: Atherosclerotic changes.  RETROPERITONEUM/LYMPH NODES: No lymphadenopathy.    ABDOMINAL WALL: Within normal limits.  BONES: Degenerative changes.    IMPRESSION:     High-grade small bowel obstruction, with transition point in the anterior left lower abdomen at the site subserosal peritoneum disease.    Peritoneal carcinomatosis, including subcapsular hepatic implants.    < end of copied text > HPI:  64 y/o female with recent of primary peritoneal CA with omental mets, ascites referred  to us last week by Dr Jean Claude Rinaldi ( [place], GYN/ ONC) for neoadjuvant therapy/ Initial scan 1/20 had revealed right pelvic mass/ omental metastases / small bowel narrowing; She had limited PO intake/ dehydration / abdominal pain when we saw her and she was therefore admitted for chemotherapy/ Had Taxol Carboplatin 2/ 1/20 ; had hydration, DC home on pain medications; We saw her in FU 2/4 and she had increasing abdominal pains,  nausea, reflux symptoms; on exam was distended,  abdominal tenderness; advised readmission and CT abdomen and pelvis: reveals high grade obstruction , small bowel; seen by Surgery, Dr Chau / NGT has been placed and she is feeling much improved; has not required pain medications since early this AM       PAST MEDICAL & SURGICAL HISTORY:  Constipation  Peritoneal carcinoma  Omental metastasis  No significant past surgical history      REVIEW OF SYSTEMS    General:	  less abdominal pain; no N/V overall feeling much improved  + weakness        Allergies    PHYSICAL EXAM:      Constitutional: Appears comfortable, in  NAD, A/O X 3  NGT in place     Eyes: EOMI, PERRLA ;No icterus    ENMT:  No oral lesions, thrush; pharynx not injected    Neck:  Supple; No masses, lymph nodes, no bruits    Breasts: NA    Back: No tenderness     Respiratory:  Clear to A/P, No wheezes, rhonchi, rales. No dullness to percussion    Cardiovascular: Normal rate and rhythm; normal S1 and S2; No murmurs. No gallop    Gastrointestinal: less  distension, + bowl sounds; No tenderness , guarding, rebound;  no masses, no hepatosplenomegaly     Genitourinary: No flank pains, no inguinal adenopathy    Rectal: NA    Extremities:  No phlebitis, no edema    Vascular: No acrocyanosis, no edema    Neurological: Alert and oriented. Cranial nerves II-XII WNL, Upper extremity / lower extremity  strength WNL;  Reflexes WNL, Plantar downgoing ; No cerebellar signs    Skin: No rash, petechiae purpura, ecchymoses    Lymph Nodes: No cervical, supraclavicular, axillary, inguinal adenopathy    Musculoskeletal: No joint swelling    Psychiatric: Alert, oriented, normal affect      < from: CT Abdomen and Pelvis w/ IV Cont (02.04.20 @ 17:24) >  EXAM:  CT ABDOMEN AND PELVIS IC                            PROCEDURE DATE:  02/04/2020          INTERPRETATION:  CLINICAL INFORMATION: Diffuse abdominal pain. Ovarian cancer.    COMPARISON: None.    PROCEDURE:   CT of the Abdomen and Pelvis was performed with intravenous contrast.   Intravenous contrast: 90 ml Omnipaque 350. 10 ml discarded.  Oral contrast: None.  Sagittal and coronal reformats were performed.    FINDINGS:    LOWER CHEST: Within normal limits.    LIVER: Subcapsular hepatic implants, measuring up to 1.5 x 1.4 cm mass along the posterior right hepatic lobe.  BILE DUCTS: Normal caliber.  GALLBLADDER: Within normal limits.  SPLEEN: Within normal limits.  PANCREAS: Within normal limits.  ADRENALS: Within normal limits.  KIDNEYS/URETERS: Left parapelvic cysts. Probable left renal cysts.    BLADDER: Within normal limits.  REPRODUCTIVE ORGANS: Calcified right uterine leiomyoma. Heterogeneous appearance of the uterus. Cystic/solid right adnexal lesion, measuring 2.5 cm.    BOWEL: High-grade small bowel obstruction, with transition point in the anterior left lower abdomen at the site subserosal peritoneum disease. Colonic diverticulosis.  PERITONEUM: Peritoneal carcinomatosis. Small volume abdominopelvic ascites.  VESSELS: Atherosclerotic changes.  RETROPERITONEUM/LYMPH NODES: No lymphadenopathy.    ABDOMINAL WALL: Within normal limits.  BONES: Degenerative changes.    IMPRESSION:     High-grade small bowel obstruction, with transition point in the anterior left lower abdomen at the site subserosal peritoneum disease.    Peritoneal carcinomatosis, including subcapsular hepatic implants.    < end of copied text >

## 2020-02-05 NOTE — CONSULT NOTE ADULT - ASSESSMENT
66 yo female admitted with nausea and poor po intake with recent diagnose of stage III C Ovarian cancer, R pelvic mass/ omental metastases s/p taxol and carb x1.  Now with new findings of a high grade small bowel obstruction.  Clinically improving with NGT in place, + BS, no gas or BM.  Maintain NPO-will order PPN for malnourishment.  Pending clinical course, possible surgery?  Continue to monitor, supportive care.  Discussed with pt, Juli, Dr. Andrews, and Dr. Altamirano.

## 2020-02-05 NOTE — PROGRESS NOTE ADULT - PROBLEM SELECTOR PLAN 1
Maintain NPO status, may have ice chips, hard candies   Maintain NGT on intermittent suction   OOB--chair as tolerated   Continue with IV hydration  PPN for nourishment   CTM, supportive care   Clinically improving, abdomen soft

## 2020-02-05 NOTE — CONSULT NOTE ADULT - SUBJECTIVE AND OBJECTIVE BOX
HPI:  66 y/o female with recent of primary peritoneal CA with omental mets, ascites referred  to us last week by Dr Jean Claude Rinaldi ( [place], GYN/ ONC) for neoadjuvant therapy/ Initial scan 1/20 had revealed right pelvic mass/ omental metastases / small bowel narrowing; She had limited PO intake/ dehydration / abdominal pain when we saw her and she was therefore admitted for chemotherapy/ Had Taxol Carboplatin 2/ 1/20 ; had hydration, DC home on pain medications; We saw her in FU 2/4 and she had increasing abdominal pains,  nausea, reflux symptoms; on exam was distended,  abdominal tenderness; advised readmission and CT abdomen and pelvis: reveals high grade obstruction , small bowel; seen by Surgery, Dr Chau / NGT has been placed and she is feeling much improved; has not required pain medications since early this AM.    2/5/2020-  Pt awake and reports feeling better now that NGT is in place.   Much improved nausea and abdominal pain.   Poor PO intake over the past several days.   No BMs/    PAST MEDICAL & SURGICAL HISTORY:  Constipation  Peritoneal carcinoma  Omental metastasis  No significant past surgical history      REVIEW OF SYSTEMS  General: less abdominal pain; no N/V overall feeling much improved,+ weakness  Cardiovascular: No SP or CP  Respiratory: No coughing, wheezing.  GI: PER HPI.    PHYSICAL EXAM:  Constitutional: Appears comfortable, in  NAD, A/O X 3  Eyes: EOMI, PERRLA ;No icterus  ENMT:  No oral lesions, thrush; pharynx not injected  Neck:  Supple; No masses, lymph nodes, no bruits  Respiratory:  Clear to A/P, No wheezes, rhonchi, rales. No dullness to percussion  Cardiovascular: Normal rate and rhythm; normal S1 and S2; No murmurs. No gallop  Gastrointestinal: soft, + bowl sounds, NGT in place; No tenderness , guarding, rebound; no masses, no hepatosplenomegaly   Genitourinary: No flank pains, no inguinal adenopathy  Extremities:  No phlebitis, no edema  Vascular: No acrocyanosis, no edema  Neurological: Alert and oriented. Cranial nerves II-XII WNL, Upper extremity / lower extremity  strength WNL;  Reflexes WNL, Plantar downgoing ; No cerebellar signs  Skin: No rash, petechiae purpura, ecchymoses  Psychiatric: Alert, oriented, normal affect      < from: CT Abdomen and Pelvis w/ IV Cont (02.04.20 @ 17:24) >  EXAM:  CT ABDOMEN AND PELVIS IC                            PROCEDURE DATE:  02/04/2020          INTERPRETATION:  CLINICAL INFORMATION: Diffuse abdominal pain. Ovarian cancer.    COMPARISON: None.    PROCEDURE:   CT of the Abdomen and Pelvis was performed with intravenous contrast.   Intravenous contrast: 90 ml Omnipaque 350. 10 ml discarded.  Oral contrast: None.  Sagittal and coronal reformats were performed.    FINDINGS:    LOWER CHEST: Within normal limits.    LIVER: Subcapsular hepatic implants, measuring up to 1.5 x 1.4 cm mass along the posterior right hepatic lobe.  BILE DUCTS: Normal caliber.  GALLBLADDER: Within normal limits.  SPLEEN: Within normal limits.  PANCREAS: Within normal limits.  ADRENALS: Within normal limits.  KIDNEYS/URETERS: Left parapelvic cysts. Probable left renal cysts.    BLADDER: Within normal limits.  REPRODUCTIVE ORGANS: Calcified right uterine leiomyoma. Heterogeneous appearance of the uterus. Cystic/solid right adnexal lesion, measuring 2.5 cm.    BOWEL: High-grade small bowel obstruction, with transition point in the anterior left lower abdomen at the site subserosal peritoneum disease. Colonic diverticulosis.  PERITONEUM: Peritoneal carcinomatosis. Small volume abdominopelvic ascites.  VESSELS: Atherosclerotic changes.  RETROPERITONEUM/LYMPH NODES: No lymphadenopathy.    ABDOMINAL WALL: Within normal limits.  BONES: Degenerative changes.    IMPRESSION:     High-grade small bowel obstruction, with transition point in the anterior left lower abdomen at the site subserosal peritoneum disease.    Peritoneal carcinomatosis, including subcapsular hepatic implants.    < end of copied text >

## 2020-02-05 NOTE — PROGRESS NOTE ADULT - SUBJECTIVE AND OBJECTIVE BOX
HPI: 66 y/o female with PMHx of advanced ovarian cancer, constipation, omental metastasis, peritoneal carcinoma presents to the ED sent by Dr. Fish for CT and for concern for dehydration. Pt reports abdominal distension, diffuse abdominal pain, unable to tolerate PO, SOB. Unable to walk long distances without getting winded. Abdominal pain was a 5/10 in severity but spikes. Was just seen at the infusion center, had labs done and a saline drip but was told to still come to the ED. Has Fentanyl patch on the left side. Last CT abdomen 1/9/20. Last chemotherapy 2/1/20.     2/5/20 - Pt reports feeling less pain, NGT on intermittent suction with minimal drainage. No nausea or vomiting, reports no passing of gas or BM's.       Vital Signs Last 24 Hrs  T(C): 36.5 (05 Feb 2020 08:57), Max: 37 (04 Feb 2020 15:30)  T(F): 97.7 (05 Feb 2020 08:57), Max: 98.6 (04 Feb 2020 15:30)  HR: 75 (05 Feb 2020 08:57) (75 - 93)  BP: 122/67 (05 Feb 2020 08:57) (110/73 - 134/64)  BP(mean): 85 (04 Feb 2020 21:55) (85 - 97)  RR: 17 (05 Feb 2020 08:57) (17 - 18)  SpO2: 94% (05 Feb 2020 08:57) (94% - 97%)                            10.8   4.64  )-----------( 245      ( 05 Feb 2020 07:54 )             34.3     02-05    137  |  107  |  22  ----------------------------<  143<H>  4.8   |  23  |  0.67    Ca    8.3<L>      05 Feb 2020 07:54 HPI: 66 y/o female with PMHx of advanced ovarian cancer, constipation, omental metastasis, peritoneal carcinoma presents to the ED sent by Dr. Fish for CT and for concern for dehydration. Pt reports abdominal distension, diffuse abdominal pain, unable to tolerate PO, SOB. Unable to walk long distances without getting winded. Abdominal pain was a 5/10 in severity but spikes. Was just seen at the infusion center, had labs done and a saline drip but was told to still come to the ED. Has Fentanyl patch on the left side. Last CT abdomen 1/9/20. Last chemotherapy 2/1/20.     2/5/20 - Pt reports feeling less pain, NGT on intermittent suction with minimal drainage. No nausea or vomiting, reports no passing of gas or BM's.     ROS: pt reports feeling less pain today, denies N/V, all other systems negative       Vital Signs Last 24 Hrs  T(C): 36.5 (05 Feb 2020 08:57), Max: 37 (04 Feb 2020 15:30)  T(F): 97.7 (05 Feb 2020 08:57), Max: 98.6 (04 Feb 2020 15:30)  HR: 75 (05 Feb 2020 08:57) (75 - 93)  BP: 122/67 (05 Feb 2020 08:57) (110/73 - 134/64)  BP(mean): 85 (04 Feb 2020 21:55) (85 - 97)  RR: 17 (05 Feb 2020 08:57) (17 - 18)  SpO2: 94% (05 Feb 2020 08:57) (94% - 97%)                            10.8   4.64  )-----------( 245      ( 05 Feb 2020 07:54 )             34.3     02-05    137  |  107  |  22  ----------------------------<  143<H>  4.8   |  23  |  0.67    Ca    8.3<L>      05 Feb 2020 07:54      Physical Exam:    Constitutional: well developed, no distress, AAOx4   Resp: no distress, respirations even and unlabored   Skin: intact, no rashes   Abd: soft, NT, ND  EENMT: NGT in place, intermittent suction

## 2020-02-05 NOTE — DIETITIAN INITIAL EVALUATION ADULT. - PERTINENT LABORATORY DATA
02-05 Na137 mmol/L Glu 143 mg/dL<H> K+ 4.8 mmol/L Cr  0.67 mg/dL BUN 22 mg/dL Phos n/a   Alb n/a   PAB n/a       CAPILLARY BLOOD GLUCOSE

## 2020-02-05 NOTE — CHART NOTE - NSCHARTNOTEFT_GEN_A_CORE
PPN RECOMMENDATIONS NOTE    *see full initial assessment for complete information*    PPN RECOMMENDATIONS:  2000mL total volume  no lipids  75g amino acids  100g dextrose  113 mEq NaCl  14 mEq NaAcetate  20 mMol NaPhos  37 mEq KCl  23 mEq KAcetate  10 mEq Calcium Gluconate  8 mEq Magnesium Sulfate  1mL trace elements  10mL MVI    total calories: 640 (meets ~ 64% of estimated calorie needs and 77% of estimated protein needs)    ADDITIONAL RECOMMENDATIONS:  1) obtain triglyceride level and LFT  2) daily lyte check  3) daily wt check  4) strict I/O  5) if pt to remain on PN for >5 days, consider PICC line placement for TPN

## 2020-02-05 NOTE — DIETITIAN INITIAL EVALUATION ADULT. - NAME AND PHONE
Juli Fuentes MA, RDN, CDN, [facility]  [phone] (office number)  [phone] (pager number)

## 2020-02-05 NOTE — CONSULT NOTE ADULT - ASSESSMENT
Stage III C Ovarian cancer  R pelvic mass/ omental metastases, small bowel obstruction  S/P  Taxol Carb x 1  Symptomatic improvement with NGT  Malnourishment  Pain syndrome : Improved    A/P    Surgery evaluation  GI evaluation re TPN Stage III C Ovarian cancer  R pelvic mass/ omental metastases, small bowel obstruction  S/P  Taxol Carb x 1  Symptomatic improvement with NGT  Malnourishment  Pain syndrome : Improved    A/P    She has advanced ovarian cancer; plan was neoadjuvant chemotherapy  q 3 weeks x 3, followed by ' interval debulking surgery' and subsequent post operative  chemotherapy / Intent of therapy was for long term control/ possibly cure     Surgery evaluation  GI evaluation re TPN  DVT prophylaxis Stage III C Ovarian cancer  R pelvic mass/ omental metastases, small bowel obstruction  S/P  Taxol Carb x 1  Symptomatic improvement with NGT  Malnourishment  Pain syndrome : Improved  Anemia: Chronic ; will check iron studies etc  A/P    She has advanced ovarian cancer; plan was neoadjuvant chemotherapy  q 3 weeks x 3, followed by ' interval debulking surgery' and subsequent post operative  chemotherapy / Intent of therapy was for long term control/ possibly cure     Surgery evaluation  GI evaluation re TPN  DVT prophylaxis

## 2020-02-05 NOTE — DIETITIAN INITIAL EVALUATION ADULT. - FACTORS AFF FOOD INTAKE
other (specify)/pt has been eating very minimal 2/2 food sitting in stomach like rock.  only one episode of nausea x 1 month: meeting <50% of estimated nutr needs x 1 mo.  NPO x 3 days.

## 2020-02-06 NOTE — PROGRESS NOTE ADULT - SUBJECTIVE AND OBJECTIVE BOX
Patient is a 65y old  Female who presents with a chief complaint of Intestinal Obstruction (05 Feb 2020 11:46)      HPI:  pt had difficult night with frequent diarrhea - improving most recently  still some discomfort on right    PAST MEDICAL & SURGICAL HISTORY:  Ovarian cancer  Constipation  Peritoneal carcinoma  Omental metastasis  No significant past surgical history    MEDICATIONS  (STANDING):  dexAMETHasone  IVPB 12 milliGRAM(s) IV Intermittent daily  fentaNYL   Patch  12 MICROgram(s)/Hr 1 Patch Transdermal every 72 hours  heparin  Injectable 5000 Unit(s) SubCutaneous every 8 hours  octreotide  Injectable 150 MICROGram(s) SubCutaneous three times a day  ondansetron Injectable 4 milliGRAM(s) IV Push every 6 hours  pantoprazole  Injectable 40 milliGRAM(s) IV Push daily  Parenteral Nutrition - Adult 1 Each (83 mL/Hr) TPN Continuous <Continuous>  sodium chloride 0.9%. 1000 milliLiter(s) (120 mL/Hr) IV Continuous <Continuous>    MEDICATIONS  (PRN):  morphine  - Injectable 2 milliGRAM(s) IV Push every 4 hours PRN Moderate Pain (4 - 6)    Allergies  codeine (Unknown)  mineral oil (Rash)  shellfish (Unknown)    REVIEW OF SYSTEMS:    CONSTITUTIONAL: weakness  RESPIRATORY: No cough, wheezing, hemoptysis; No shortness of breath  CARDIOVASCULAR: No chest pain or palpitations  GASTROINTESTINAL: as above  All other review of systems is negative unless indicated above.    Vital Signs Last 24 Hrs  T(C): 36.2 (05 Feb 2020 21:12), Max: 36.5 (05 Feb 2020 08:57)  T(F): 97.2 (05 Feb 2020 21:12), Max: 97.7 (05 Feb 2020 08:57)  HR: 70 (05 Feb 2020 21:12) (70 - 75)  BP: 137/69 (05 Feb 2020 21:12) (122/67 - 137/69)  BP(mean): --  RR: 18 (05 Feb 2020 21:12) (17 - 18)  SpO2: 95% (05 Feb 2020 21:12) (94% - 95%)    PHYSICAL EXAM:  Constitutional: appears chronically ill  Respiratory: CTA  Cardiovascular: S1 and S2  Gastrointestinal: BS+, soft, mildly distended, tender on right      LABS:                        10.8   4.64  )-----------( 245      ( 05 Feb 2020 07:54 )             34.3     02-05    137  |  107  |  22  ----------------------------<  143<H>  4.8   |  23  |  0.67    Ca    8.3<L>      05 Feb 2020 07:54      PT/INR - ( 04 Feb 2020 18:32 )   PT: 12.6 sec;   INR: 1.13 ratio         PTT - ( 04 Feb 2020 18:32 )  PTT:28.9 sec      RADIOLOGY & ADDITIONAL STUDIES:

## 2020-02-06 NOTE — PROGRESS NOTE ADULT - SUBJECTIVE AND OBJECTIVE BOX
CC:Patient is a 65y old  Female who presents with a chief complaint of bowel obstruction (06 Feb 2020 08:17)      Subjective:  Pt seen and examined at bedside with chaperone. Pt is AAOx3, pt in no acute distress. Pt states resolving c/o abd pain since admission. Pt denied c/o fever, chills, chest pain, SOB, abd pain, N/V/D, extremity pain or dysfunction, hemoptysis, hematemesis, hematuria, hematochexia, headache, diplopia, vertigo, dizzyness. Pt states (+) void, (+) ambulation, (+) bowel function of flatus and bm    ROS:  otherwise as abovementioned ROS    Vital Signs Last 24 Hrs  T(C): 36.4 (06 Feb 2020 08:34), Max: 36.4 (06 Feb 2020 08:34)  T(F): 97.6 (06 Feb 2020 08:34), Max: 97.6 (06 Feb 2020 08:34)  HR: 65 (06 Feb 2020 08:34) (65 - 70)  BP: 127/60 (06 Feb 2020 08:34) (127/60 - 137/69)  BP(mean): --  RR: 18 (06 Feb 2020 08:34) (18 - 18)  SpO2: 95% (06 Feb 2020 08:34) (95% - 95%)    Labs:                                10.8   4.64  )-----------( 245      ( 05 Feb 2020 07:54 )             34.3     CBC Full  -  ( 05 Feb 2020 07:54 )  WBC Count : 4.64 K/uL  RBC Count : 3.68 M/uL  Hemoglobin : 10.8 g/dL  Hematocrit : 34.3 %  Platelet Count - Automated : 245 K/uL  Mean Cell Volume : 93.2 fl  Mean Cell Hemoglobin : 29.3 pg  Mean Cell Hemoglobin Concentration : 31.5 gm/dL  Auto Neutrophil # : x  Auto Lymphocyte # : x  Auto Monocyte # : x  Auto Eosinophil # : x  Auto Basophil # : x  Auto Neutrophil % : x  Auto Lymphocyte % : x  Auto Monocyte % : x  Auto Eosinophil % : x  Auto Basophil % : x    02-06    138  |  108  |  26<H>  ----------------------------<  145<H>  4.1   |  25  |  0.75    Ca    8.0<L>      06 Feb 2020 08:37  Phos  2.1     02-06  Mg     1.8     02-06    TPro  5.9<L>  /  Alb  2.0<L>  /  TBili  0.2  /  DBili  x   /  AST  52<H>  /  ALT  75  /  AlkPhos  117  02-06    LIVER FUNCTIONS - ( 06 Feb 2020 08:37 )  Alb: 2.0 g/dL / Pro: 5.9 gm/dL / ALK PHOS: 117 U/L / ALT: 75 U/L / AST: 52 U/L / GGT: x           PT/INR - ( 04 Feb 2020 18:32 )   PT: 12.6 sec;   INR: 1.13 ratio         PTT - ( 04 Feb 2020 18:32 )  PTT:28.9 sec      Meds:  dexAMETHasone  IVPB 12 milliGRAM(s) IV Intermittent daily  fentaNYL   Patch  12 MICROgram(s)/Hr 1 Patch Transdermal every 72 hours  heparin  Injectable 5000 Unit(s) SubCutaneous every 8 hours  morphine  - Injectable 2 milliGRAM(s) IV Push every 4 hours PRN  octreotide  Injectable 150 MICROGram(s) SubCutaneous three times a day  ondansetron Injectable 4 milliGRAM(s) IV Push every 6 hours  pantoprazole  Injectable 40 milliGRAM(s) IV Push daily  Parenteral Nutrition - Adult 1 Each TPN Continuous <Continuous>  Parenteral Nutrition - Adult 1 Each TPN Continuous <Continuous>  sodium chloride 0.9%. 1000 milliLiter(s) IV Continuous <Continuous>      Radiology:  < from: Xray Small Bowel Series (02.05.20 @ 14:51) >  EXAM:  XR SM INTEST SNGL CON STDY                            PROCEDURE DATE:  02/05/2020          INTERPRETATION:  CLINICAL INFORMATION: Malignant small bowel obstruction    TECHNIQUE: A limited small bowel series was performed with serial abdominal radiographs obtained prior to and following the oral administration of 100 cc of Gastroview water-soluble contrast.    COMPARISON: CT abdomen/pelvis February 04, 2020    FINDINGS:  radiograph of the abdomen demonstrates an enteric tube with itstip in the body of the stomach. The bowel gas pattern is nonobstructive. Gas is seen in the colon. Vicarious excretion of contrast is noted in the gallbladder.    On the 2 hour film, contrast opacifies most of the small bowel, which is nondilated. Onthe 4 hour radiograph, contrast partially opacifies the right colon.    IMPRESSION:    No evidence of bowel obstruction.  Contrast opacification of the colon at 4 hours.                SILVIA VILLEGAS   This document has been electronically signed. Feb 62020  9:49AM        < end of copied text >      Physical exam:  Pt is aaox3  Pt in no acute distress  Psych: normal affect  Resp: CTAB  CVS: S1S2(+)  ABD: bowel sounds (+), soft, non distended, no rebound, no guarding, no rigidity, no skin changes to exam. Mild/improved lower quadrant tenderness to exam. NGT demonstrates appropriate output  EXT: no calf tenderness or edema to exam b/l, on VTE prophylaxis  Skin: no adverse skin changes to exam

## 2020-02-06 NOTE — PROGRESS NOTE ADULT - ASSESSMENT
A/P:  Resolving SBO  Trial of diet advancement  D/C NGT  Monitor bowel function  GI/DVT prophylaxis  Pain control  Incentive spirometry  Serial abd exams  F/U labs  Cont current care and meds  Pt aware of and agrees with all of the above

## 2020-02-06 NOTE — PROGRESS NOTE ADULT - ASSESSMENT
66yo female with ovarian cancer and carcinomatosis with bowel obstruction  bowel function returning  - plan per surgery  consider d/c ng tube  diarrhea most likely due to return of bowel function rather than pathologic

## 2020-02-06 NOTE — CHART NOTE - NSCHARTNOTEFT_GEN_A_CORE
Clinical Nutrition Parenteral Nutrition Follow Up Note    *pt w/ PPN infusing overnight. Pt noted w/ frequent diarrhea and bowel function returning. Pt remains w/ NGT noted by GI to consider d/c.   *labs reviewed: 02-06 Na138 mmol/L Glu 145 mg/dL<H> K+ 4.1 mmol/L Cr  0.75 mg/dL BUN 26 mg/dL<H> Phos 2.1 mg/dL<L> Alb 2.0 g/dL<L> PAB n/a ; noted hypophosphatemia; NaPhos increased by 5 mMol. Recommend monitoring BMP, Mg, Phos daily.   *noted output over past 24 hours: NGT 60mL; voided 1x not saved; 1 stool. Input over past 24 hours: 2000 mL PPN; 1000 mL NaCl 0.9% IVF: total 3000 mL. Recommend d/c IVF as pt receiving 2000 mL from PPN.   *no noted edema or skin breakdown; mauricio 17.       PPN RECOMMENDATIONS:    2000mL total volume  no lipids  75g amino acids  100g dextrose  113 mEq NaCl  8 mEq NaAcetate  25 mMol NaPhos  34 mEq KCl  26 mEq KAcetate  10 mEq Calcium Gluconate  8 mEq Magnesium Sulfate  1mL trace elements  10mL MVI    total calories: 640 (meets ~ 64% of estimated calorie needs and 77% of estimated protein needs)          ADDITIONAL RECOMMENDATIONS:  1) daily wt checks  2) strict I/O's  3) daily lyte checks  4) weekly triglyceride/LFT checks Clinical Nutrition Parenteral Nutrition Follow Up Note    *pt w/ PPN infusing overnight. Pt noted w/ frequent diarrhea and bowel function returning. Pt remains w/ NGT noted by GI to consider d/c.   *labs reviewed: 02-06 Na138 mmol/L Glu 145 mg/dL<H> K+ 4.1 mmol/L Cr  0.75 mg/dL BUN 26 mg/dL<H> Phos 2.1 mg/dL<L> Alb 2.0 g/dL<L> PAB n/a ; noted hypophosphatemia; NaPhos increased by 5 mMol. Recommend monitoring BMP, Mg, Phos daily.   CAPILLARY BLOOD GLUCOSE  POCT Blood Glucose.: 146 mg/dL (06 Feb 2020 06:37)  POCT Blood Glucose.: 117 mg/dL (05 Feb 2020 22:48)  *noted output over past 24 hours: NGT 60mL; voided 1x not saved; 1 stool. Input over past 24 hours: 2000 mL PPN; 1000 mL NaCl 0.9% IVF: total 3000 mL. Recommend d/c IVF as pt receiving 2000 mL from PPN.   *no noted edema or skin breakdown; mauricio 17.       PPN RECOMMENDATIONS:    2000mL total volume  no lipids  75g amino acids  100g dextrose  113 mEq NaCl  8 mEq NaAcetate  25 mMol NaPhos  34 mEq KCl  26 mEq KAcetate  10 mEq Calcium Gluconate  8 mEq Magnesium Sulfate  1mL trace elements  10mL MVI    total calories: 640 (meets ~ 64% of estimated calorie needs and 77% of estimated protein needs)          ADDITIONAL RECOMMENDATIONS:  1) daily wt checks  2) strict I/O's  3) daily lyte checks  4) weekly triglyceride/LFT checks

## 2020-02-07 NOTE — CHART NOTE - NSCHARTNOTEFT_GEN_A_CORE
*pt w/ PPN infusing overnight. Pt noted w/ frequent diarrhea and bowel function returning. Pt with multiple episodes of diarrhea (possibly due to post obstruction) however tolerating clear liquids with minimal nausea. As per GI will continue with PPN due to malnutrition status and until pt tolerating >50% of ENN.   Labs reviewed: No labs ordered for 2/7. Triglycerides, Serum: 150 mg/dL (02.06.20 @ 08:37) will initiate lipids.   POCT Blood Glucose.: 122 mg/dL (07 Feb 2020 05:21)  POCT Blood Glucose.: 182 mg/dL (06 Feb 2020 17:24)  POCT Blood Glucose.: 154 mg/dL (06 Feb 2020 12:07)    I & O's - multiple episodes of BM's, + Urine output-not measured    No recent weights documented. Need accurate current weight for further nutritional assessment.     Estimated nutrition needs based on Adjusted BW 69Kg:  (25-30calories/kg) 5380-7503 calories  (1.4-1.6gm protein/kg)  gm protein  (25-30ml/kg) 1750-2100ml/daily      PPN RECOMMENDATIONS:    2000mL total volume  50 gm 20% Lipids (SMOF)  75g amino acids  100g dextrose  113 mEq NaCl  8 mEq NaAcetate  25 mMol NaPhos  34 mEq KCl  26 mEq KAcetate  10 mEq Calcium Gluconate  10 mEq Magnesium Sulfate  1mL trace elements  10mL MVI      total calories: 1140 (meets ~ 65% of estimated calorie needs and 77% of estimated protein needs)    ADDITIONAL RECOMMENDATIONS:  1) daily wt checks  2) strict I/O's  3) daily lyte checks  4) weekly triglyceride/LFT checks.

## 2020-02-07 NOTE — CHART NOTE - NSCHARTNOTEFT_GEN_A_CORE
Pt on clear liquid diet, was c/o diarrhea last night and this AM,  she believes it's due to all the sugar in her liquids,   but may be due to decrease in size of blockage  Pt sent chicken broth in AM as per her request  Gelatein plus added BID for extra 40 g protein/day.  Suggest advance when medically feasible to full liquid diet.  PPN order entered for 2/7/2020, see chart note for details.  Will continue to monitor tolerance, labs, meds and wt.

## 2020-02-07 NOTE — PROGRESS NOTE ADULT - ASSESSMENT
64 yo female admitted with nausea and poor po intake with recent diagnose of stage III C Ovarian cancer, R pelvic mass/ omental metastases s/p taxol and carb x1 on Saturday.  Now with new findings of a high grade small bowel obstruction.    Clinically improving, now with diarrhea likely due to post obstruction.    Continue clears, will renew PPN now for malnourishment.    Discussed with pt, Dr. Powell, dietary, and Dr. Andrews.

## 2020-02-07 NOTE — PROGRESS NOTE ADULT - SUBJECTIVE AND OBJECTIVE BOX
Patient is a 65y old  Female who presents with a chief complaint of Abdominal pain (06 Feb 2020 13:47)    HPI:  64 y/o female with PMHx of advanced ovarian cancer, constipation, omental metastasis, peritoneal carcinoma presents to the ED sent by Dr. Fish for CT and for concern for dehydration. Pt reports abdominal distension, diffuse abdominal pain, unable to tolerate PO, SOB. Unable to walk long distances without getting winded. Abdominal pain is now a 5/10 in severity but spikes. Was just seen at the infusion center, had labs done and a saline drip but was told to still come to the ED. She has diffuse abdominal pain, associated with nausea, not passing gas. No fever. Never had a colonoscopy. Has not been eating well for last 15 days. Anorexia. Last  chemotherapy 2/1/20. No recent sick contacts. No recent travel. (04 Feb 2020 13:28).    2/7/2020-  Pt with diarrhea over night.   Occasional nausea.   Tolerating clears.   Less abdominal fullness and pain.     PAST MEDICAL & SURGICAL HISTORY:  Ovarian cancer  Constipation  Peritoneal carcinoma  Omental metastasis  No significant past surgical history    MEDICATIONS  (STANDING):  dexAMETHasone  IVPB 12 milliGRAM(s) IV Intermittent daily  fentaNYL   Patch  12 MICROgram(s)/Hr 1 Patch Transdermal every 72 hours  heparin  Injectable 5000 Unit(s) SubCutaneous every 8 hours  octreotide  Injectable 150 MICROGram(s) SubCutaneous three times a day  ondansetron Injectable 4 milliGRAM(s) IV Push every 6 hours  pantoprazole  Injectable 40 milliGRAM(s) IV Push daily  Parenteral Nutrition - Adult 1 Each (83 mL/Hr) TPN Continuous <Continuous>    MEDICATIONS  (PRN):  morphine  - Injectable 2 milliGRAM(s) IV Push every 4 hours PRN Moderate Pain (4 - 6)    Allergies  codeine (Unknown)  mineral oil (Rash)  shellfish (Unknown)    FAMILY HISTORY:  FH: heart attack    REVIEW OF SYSTEMS:  CONSTITUTIONAL: No weakness, fevers or chills  RESPIRATORY: No cough, wheezing, hemoptysis; No shortness of breath  CARDIOVASCULAR: No chest pain or palpitations  GASTROINTESTINAL: Per HPI.     Vital Signs Last 24 Hrs  T(C): 36.9 (07 Feb 2020 08:24), Max: 36.9 (07 Feb 2020 08:24)  T(F): 98.4 (07 Feb 2020 08:24), Max: 98.4 (07 Feb 2020 08:24)  HR: 64 (07 Feb 2020 08:24) (60 - 64)  BP: 138/67 (07 Feb 2020 08:24) (126/66 - 138/67)  BP(mean): --  RR: 18 (07 Feb 2020 08:24) (18 - 18)  SpO2: 94% (07 Feb 2020 08:24) (94% - 98%)    PHYSICAL EXAM:  Constitutional: NAD, well-developed  Respiratory: CTAB  Cardiovascular: S1 and S2, RRR, no M/G/R  Gastrointestinal: BS+, soft, NT/ND    LABS:    02-06    138  |  108  |  26<H>  ----------------------------<  145<H>  4.1   |  25  |  0.75    Ca    8.0<L>      06 Feb 2020 08:37  Phos  2.1     02-06  Mg     1.8     02-06    TPro  5.9<L>  /  Alb  2.0<L>  /  TBili  0.2  /  DBili  x   /  AST  52<H>  /  ALT  75  /  AlkPhos  117  02-06      LIVER FUNCTIONS - ( 06 Feb 2020 08:37 )  Alb: 2.0 g/dL / Pro: 5.9 gm/dL / ALK PHOS: 117 U/L / ALT: 75 U/L / AST: 52 U/L / GGT: x             RADIOLOGY & ADDITIONAL STUDIES:

## 2020-02-07 NOTE — PROGRESS NOTE ADULT - ATTENDING COMMENTS
See NP note for details. Patient with resolving partial SBO. lungs - clear, cor - RRR, abd soft on exam. Will continue PPI until tolerating adequate po intake.

## 2020-02-07 NOTE — PROGRESS NOTE ADULT - SUBJECTIVE AND OBJECTIVE BOX
Patient is a 65y old  Female who presents with a chief complaint of Abdominal pain (07 Feb 2020 09:32)      HPI:  66 y/o female with PMHx of advanced ovarian cancer, constipation, omental metastasis, peritoneal carcinoma presents to the ED sent by Dr. Fish for CT and for concern for dehydration. Pt reports abdominal distension, diffuse abdominal pain, unable to tolerate PO, SOB. Unable to walk long distances without getting winded. Abdominal pain is now a 5/10 in severity but spikes. Was just seen at the infusion center, had labs done and a saline drip but was told to still come to the ED. She has diffuse abdominal pain, associated with nausea, not passing gas. No fever. Never had a colonoscopy. Has not been eating well for last 15 days. Anorexia. Last  chemotherapy 2/1/20. No recent sick contacts. No recent travel. (04 Feb 2020 13:28)  2/7: Pt seen and examined, feeling well, tolerating CLD, no pain, passing gas, had multiple loose BMs, no fever.   ROS:.  [X] A ten-point review of systems was otherwise negative except as noted.  Systemic:	[ ] Fever	[ ] Chills	[ ] Night sweats    [ ] Fatigue	[ ] Other  [] Cardiovascular:  [] Pulmonary:  [] Renal/Urologic:  [] Gastrointestinal: abdominal pain, vomiting  [] Metabolic:  [] Neurologic:  [] Hematologic:  [] ENT:  [] Ophthalmologic:  [] Musculoskeletal:    [ ] Due to altered mental status/intubation, subjective information were not able to be obtained from the patient. History was obtained, to the extent possible, from review of the chart and collateral sources of information.    All other system review is negative .  PAST MEDICAL & SURGICAL HISTORY:  Ovarian cancer  Constipation  Peritoneal carcinoma  Omental metastasis  No significant past surgical history    FAMILY HISTORY:  FH: heart attack    Social History:     Alcohol: Denied  Smoking: Denied  Drug Use: Denied        Vital Signs Last 24 Hrs  T(C): 36.9 (07 Feb 2020 08:24), Max: 36.9 (07 Feb 2020 08:24)  T(F): 98.4 (07 Feb 2020 08:24), Max: 98.4 (07 Feb 2020 08:24)  HR: 64 (07 Feb 2020 08:24) (60 - 64)  BP: 138/67 (07 Feb 2020 08:24) (126/66 - 138/67)  BP(mean): --  RR: 18 (07 Feb 2020 08:24) (18 - 18)  SpO2: 94% (07 Feb 2020 08:24) (94% - 98%)    I&O's Summary    06 Feb 2020 07:01  -  07 Feb 2020 07:00  --------------------------------------------------------  IN: 1900 mL / OUT: 1 mL / NET: 1899 mL        PHYSICAL EXAM:  Constitutional: NAD, GCS: 15/15  AOX3  Eyes:  WNL  ENMT:  WNL  Neck:  WNL, non tender  Back: Non tender  Respiratory: CTABL  Cardiovascular:  S1+S2+0  Gastrointestinal: Soft, ND , NT  Genitourinary:  WNL  Extremities: NV intact  Vascular:  Intact  Neurological: No focal neurological deficit,  CN, motor and sensory system grossly intact.  Skin: WNL  Musculoskeletal: WNL  Psychiatric: Grossly WNL        Labs:        02-06    138  |  108  |  26<H>  ----------------------------<  145<H>  4.1   |  25  |  0.75    Ca    8.0<L>      06 Feb 2020 08:37  Phos  2.1     02-06  Mg     1.8     02-06    TPro  5.9<L>  /  Alb  2.0<L>  /  TBili  0.2  /  DBili  x   /  AST  52<H>  /  ALT  75  /  AlkPhos  117  02-06      < from: Xray Small Bowel Series (02.05.20 @ 14:51) >  IMPRESSION:    No evidence of bowel obstruction.  Contrast opacification of the colon at 4 hours.            < end of copied text >

## 2020-02-07 NOTE — PROGRESS NOTE ADULT - ASSESSMENT
65 Y old female with metastatic ovarian CA with malignant SBO,  now resolved, malnourished on PPN    IVL  Continue PPN  Advance diet as tolerated  Monitor diarrhea  GI following   Oncology following   Possible D/C home  in am .

## 2020-02-08 NOTE — PROGRESS NOTE ADULT - SUBJECTIVE AND OBJECTIVE BOX
64 y/o female with recent of primary peritoneal CA with omental mets, ascites referred  to us last week by Dr Jean Claude Rinaldi ( [place], GYN/ ONC) for neoadjuvant therapy/ Initial scan 1/20 had revealed right pelvic mass/ omental metastases / small bowel narrowing; She had limited PO intake/ dehydration / abdominal pain when we saw her and she was therefore admitted for chemotherapy/ Had Taxol Carboplatin 2/ 1/20 ; had hydration, DC home on pain medications; We saw her in FU 2/4 and she had increasing abdominal pains,  nausea, reflux symptoms; on exam was distended,  abdominal tenderness; advised readmission and CT abdomen and pelvis: reveals high grade obstruction  of small bowel    NGT now out after improvement.   patient is on PPN    c/o nausea and 1 episode of vomiting lst night    PAST MEDICAL & SURGICAL HISTORY:  Constipation  Peritoneal carcinoma  Omental metastasis  No significant past surgical history      REVIEW OF SYSTEMS    weak  nausea  vomiting  abdominal pain        PHYSICAL EXAM:    frail  pale  BS+  chest clear  H1 H2      < from: CT Abdomen and Pelvis w/ IV Cont (02.04.20 @ 17:24) >  EXAM:  CT ABDOMEN AND PELVIS IC                            PROCEDURE DATE:  02/04/2020          INTERPRETATION:  CLINICAL INFORMATION: Diffuse abdominal pain. Ovarian cancer.    COMPARISON: None.    PROCEDURE:   CT of the Abdomen and Pelvis was performed with intravenous contrast.   Intravenous contrast: 90 ml Omnipaque 350. 10 ml discarded.  Oral contrast: None.  Sagittal and coronal reformats were performed.    FINDINGS:    LOWER CHEST: Within normal limits.    LIVER: Subcapsular hepatic implants, measuring up to 1.5 x 1.4 cm mass along the posterior right hepatic lobe.  BILE DUCTS: Normal caliber.  GALLBLADDER: Within normal limits.  SPLEEN: Within normal limits.  PANCREAS: Within normal limits.  ADRENALS: Within normal limits.  KIDNEYS/URETERS: Left parapelvic cysts. Probable left renal cysts.    BLADDER: Within normal limits.  REPRODUCTIVE ORGANS: Calcified right uterine leiomyoma. Heterogeneous appearance of the uterus. Cystic/solid right adnexal lesion, measuring 2.5 cm.    BOWEL: High-grade small bowel obstruction, with transition point in the anterior left lower abdomen at the site subserosal peritoneum disease. Colonic diverticulosis.  PERITONEUM: Peritoneal carcinomatosis. Small volume abdominopelvic ascites.  VESSELS: Atherosclerotic changes.  RETROPERITONEUM/LYMPH NODES: No lymphadenopathy.    ABDOMINAL WALL: Within normal limits.  BONES: Degenerative changes.    IMPRESSION:     High-grade small bowel obstruction, with transition point in the anterior left lower abdomen at the site subserosal peritoneum disease.    Peritoneal carcinomatosis, including subcapsular hepatic implants.    < end of copied text >

## 2020-02-08 NOTE — PROGRESS NOTE ADULT - ASSESSMENT
65 Y old female with metastatic ovarian CA with malignant SBO,  now resolved, malnourished on PPN    IVL  Will stop PPN today   Advance diet as tolerated  Monitor diarrhea  Calorie count today   GI following   Oncology following   Stable from surgical stand point  D/C planning pending medical clearance pt feels she is too weak to go home .

## 2020-02-08 NOTE — PROGRESS NOTE ADULT - ASSESSMENT
Stage III C Ovarian cancer  R pelvic mass/ omental metastases, small bowel obstruction  S/P  Taxol Carb x 1    Malnourishment    SBO  symptoms seem a little worse off NGT    rec  FU surgery  shall likely need to be observed over the weekend to ensure that she does not develop recurrent SBO

## 2020-02-08 NOTE — PROGRESS NOTE ADULT - SUBJECTIVE AND OBJECTIVE BOX
Patient is a 65y old  Female who presents with a chief complaint of Abdominal pain (08 Feb 2020 11:01)      HPI:  66 y/o female with PMHx of advanced ovarian cancer, constipation, omental metastasis, peritoneal carcinoma presents to the ED sent by Dr. Fish for CT and for concern for dehydration. Pt reports abdominal distension, diffuse abdominal pain, unable to tolerate PO, SOB. Unable to walk long distances without getting winded. Abdominal pain is now a 5/10 in severity but spikes. Was just seen at the infusion center, had labs done and a saline drip but was told to still come to the ED. She has diffuse abdominal pain, associated with nausea, not passing gas. No fever. Never had a colonoscopy. Has not been eating well for last 15 days. Anorexia. Last  chemotherapy 2/1/20. No recent sick contacts. No recent travel. (04 Feb 2020 13:28)      debbie Small amount of food yesterday without any nausea or vomiting but with abdominal distention and discomfort on the left side of the abdomen.  She is willing to try some Ensure today.  Negative chest pain.  Positive bowel movement, loose    PAST MEDICAL & SURGICAL HISTORY:  Ovarian cancer  Constipation  Peritoneal carcinoma  Omental metastasis  No significant past surgical history      MEDICATIONS  (STANDING):  dexAMETHasone  IVPB 12 milliGRAM(s) IV Intermittent daily  fat emulsion (Fish Oil and Plant Based) 20% Infusion 0.61 Gm/kG/Day (20.83 mL/Hr) IV Continuous <Continuous>  fentaNYL   Patch  12 MICROgram(s)/Hr 1 Patch Transdermal every 72 hours  heparin  Injectable 5000 Unit(s) SubCutaneous every 8 hours  melatonin 5 milliGRAM(s) Oral at bedtime  octreotide  Injectable 150 MICROGram(s) SubCutaneous three times a day  ondansetron Injectable 4 milliGRAM(s) IV Push every 6 hours  pantoprazole  Injectable 40 milliGRAM(s) IV Push daily  Parenteral Nutrition - Adult 1 Each (83 mL/Hr) TPN Continuous <Continuous>    MEDICATIONS  (PRN):  morphine  - Injectable 2 milliGRAM(s) IV Push every 4 hours PRN Moderate Pain (4 - 6)      Allergies    codeine (Unknown)  mineral oil (Rash)  shellfish (Unknown)    Intolerances        SOCIAL HISTORY:NC    FAMILY HISTORY:  FH: heart attack      REVIEW OF SYSTEMS:    CONSTITUTIONAL: No weakness, fevers or chills  EYES/ENT: No visual changes;  No vertigo or throat pain   NECK: No pain or stiffness  RESPIRATORY: No cough, wheezing, hemoptysis; No shortness of breath  CARDIOVASCULAR: No chest pain or palpitations  GENITOURINARY: No dysuria, frequency or hematuria  NEUROLOGICAL: No numbness or weakness  SKIN: No itching, burning, rashes, or lesions   All other review of systems is negative unless indicated above.    Vital Signs Last 24 Hrs  T(C): 36.7 (08 Feb 2020 08:32), Max: 36.7 (07 Feb 2020 20:26)  T(F): 98 (08 Feb 2020 08:32), Max: 98.1 (07 Feb 2020 20:26)  HR: 64 (08 Feb 2020 08:32) (60 - 70)  BP: 134/69 (08 Feb 2020 08:32) (128/65 - 143/73)  BP(mean): --  RR: 17 (08 Feb 2020 08:32) (17 - 18)  SpO2: 95% (08 Feb 2020 08:32) (95% - 96%)    PHYSICAL EXAM:    Constitutional: NAD, well-developed  HEENT: EOMI, throat clear  Neck: No LAD, supple  Respiratory: CTA and P  Cardiovascular: S1 and S2, RRR, no M  Gastrointestinal: BS+, soft, mild diff tend, neg HSM,  Extremities: No peripheral edema, neg clubing, cyanosis  Vascular: 2+ peripheral pulses  Neurological: A/O x 3, no focal deficits  Psychiatric: Normal mood, normal affect  Skin: No rashes    LABS:    02-08    138  |  107  |  18  ----------------------------<  138<H>  4.1   |  25  |  0.65    Ca    8.3<L>      08 Feb 2020 09:29  Phos  2.7     02-08  Mg     1.7     02-08    TPro  5.7<L>  /  Alb  2.2<L>  /  TBili  0.2  /  DBili  x   /  AST  26  /  ALT  44  /  AlkPhos  104  02-08            RADIOLOGY & ADDITIONAL STUDIES:

## 2020-02-08 NOTE — CHART NOTE - NSCHARTNOTEFT_GEN_A_CORE
Clinical Nutrition Parenteral Nutrition Follow Up Note    *pt being managed for metastatic ovarian CA with malignant SBO (now resolved?).  Per MD, PN stopping today with calorie count to determine adequacy of PO intake.  RD encouraged ensure enlive to optimize PO intake with patient, pt verbalized understanding.    *pt c/w diarrhea.  No nausea reported.    *labs reviewed:02-08 Na138 mmol/L Glu 138 mg/dL<H> K+ 4.1 mmol/L Cr  0.65 mg/dL BUN 18 mg/dL Phos 2.7 mg/dL Alb 2.2 g/dL<L> PAB n/a     POCT Blood Glucose.: 132 mg/dL (08 Feb 2020 06:09)  POCT Blood Glucose.: 126 mg/dL (08 Feb 2020 00:32)  POCT Blood Glucose.: 148 mg/dL (07 Feb 2020 17:37)  POCT Blood Glucose.: 129 mg/dL (07 Feb 2020 11:33)      *edema: none.  mauricio score of 19, no PU noted.      Recommendations:  1) add ensure enlive TID as diet is advanced to regular  2) record all PO intake on calorie count sheets        Estimated nutrition needs based on Adjusted BW 69Kg:  (25-30calories/kg) 9700-5141 calories  (1.4-1.6gm protein/kg)  gm protein  (25-30ml/kg) 1750-2100ml/daily

## 2020-02-08 NOTE — PROGRESS NOTE ADULT - SUBJECTIVE AND OBJECTIVE BOX
Patient is a 65y old  Female who presents with a chief complaint of Abdominal pain (08 Feb 2020 09:51)      HPI:  64 y/o female with PMHx of advanced ovarian cancer, constipation, omental metastasis, peritoneal carcinoma presents to the ED sent by Dr. Fish for CT and for concern for dehydration. Pt reports abdominal distension, diffuse abdominal pain, unable to tolerate PO, SOB. Unable to walk long distances without getting winded. Abdominal pain is now a 5/10 in severity but spikes. Was just seen at the infusion center, had labs done and a saline drip but was told to still come to the ED. She has diffuse abdominal pain, associated with nausea, not passing gas. No fever. Never had a colonoscopy. Has not been eating well for last 15 days. Anorexia. Last  chemotherapy 2/1/20. No recent sick contacts. No recent travel. (04 Feb 2020 13:28)  2/8: doing well, no pain, mild nausea passing gas, BM X 5 last night, no fever, tolerating diet.   ROS:.  [X] A ten-point review of systems was otherwise negative except as noted.  Systemic:	[ ] Fever	[ ] Chills	[ ] Night sweats    [ ] Fatigue	[ ] Other  [] Cardiovascular:  [] Pulmonary:  [] Renal/Urologic:  [] Gastrointestinal: abdominal pain, vomiting  [] Metabolic:  [] Neurologic:  [] Hematologic:  [] ENT:  [] Ophthalmologic:  [] Musculoskeletal:    [ ] Due to altered mental status/intubation, subjective information were not able to be obtained from the patient. History was obtained, to the extent possible, from review of the chart and collateral sources of information.    All other system review is negative .  PAST MEDICAL & SURGICAL HISTORY:  Ovarian cancer  Constipation  Peritoneal carcinoma  Omental metastasis  No significant past surgical history    FAMILY HISTORY:  FH: heart attack    Social History:     Alcohol: Denied  Smoking: Denied  Drug Use: Denied        Vital Signs Last 24 Hrs  T(C): 36.7 (08 Feb 2020 08:32), Max: 36.7 (07 Feb 2020 20:26)  T(F): 98 (08 Feb 2020 08:32), Max: 98.1 (07 Feb 2020 20:26)  HR: 64 (08 Feb 2020 08:32) (60 - 70)  BP: 134/69 (08 Feb 2020 08:32) (128/65 - 143/73)  BP(mean): --  RR: 17 (08 Feb 2020 08:32) (17 - 18)  SpO2: 95% (08 Feb 2020 08:32) (95% - 96%)    I&O's Summary      PHYSICAL EXAM:  Constitutional: NAD, GCS: 15/15  AOX3  Eyes:  WNL  ENMT:  WNL  Neck:  WNL, non tender  Back: Non tender  Respiratory: CTABL  Cardiovascular:  S1+S2+0  Gastrointestinal: Soft, ND , NT  Genitourinary:  WNL  Extremities: NV intact  Vascular:  Intact  Neurological: No focal neurological deficit,  CN, motor and sensory system grossly intact.  Skin: WNL  Musculoskeletal: WNL  Psychiatric: Grossly WNL        Labs:        02-08    138  |  107  |  18  ----------------------------<  138<H>  4.1   |  25  |  0.65    Ca    8.3<L>      08 Feb 2020 09:29  Phos  2.7     02-08  Mg     1.7     02-08    TPro  5.7<L>  /  Alb  2.2<L>  /  TBili  0.2  /  DBili  x   /  AST  26  /  ALT  44  /  AlkPhos  104  02-08

## 2020-02-08 NOTE — PROGRESS NOTE ADULT - ASSESSMENT
Gastrointestinal dysmotility secondary to peritoneal carcinomatosis, advance diet as tolerated.  We will try to see if her appetite improves off of nutritional support and PPN

## 2020-02-09 NOTE — CHART NOTE - NSCHARTNOTEFT_GEN_A_CORE
Calorie Count Day 1    *pt consumed only 1 ensure enlive in past 24hours.  Which provided 350 calories and 20g protein.  Which met ~20% of estimated calorie and protein needs.  Pt had dry heaving, nausea, diarrhea, with severe cramps after consumption of ensure enlive (which patient drank in later hours of day) and throughout night.  d/w pt, this may just be a reaction to the ensure, will provide milkshake in place of ensure product to see if this improves symptoms.  zofran is no longer an active order to be able to be given as directions state to stop after 5 days of being given.  consider starting oral anti-emetics to aid with nausea symptom.      RECOMMENDATIONS:  1) provide milkshake in place of ensure  2) consider advancing diet to regular  3) consider starting oral anti-emetic to aid with nausea    RD will f/u on 2/10/20 to determine adequacy of PO intake on 2/9/20.        Estimated nutrition needs based on Adjusted BW 69Kg:  (25-30calories/kg) 3012-5650 calories  (1.4-1.6gm protein/kg)  gm protein  (25-30ml/kg) 1750-2100ml/daily.

## 2020-02-09 NOTE — PROGRESS NOTE ADULT - SUBJECTIVE AND OBJECTIVE BOX
Patient is a 65y old  Female who presents with a chief complaint of Abdominal pain (09 Feb 2020 12:15)      HPI:  66 y/o female with PMHx of advanced ovarian cancer, constipation, omental metastasis, peritoneal carcinoma presents to the ED sent by Dr. Fish for CT and for concern for dehydration. Pt reports abdominal distension, diffuse abdominal pain, unable to tolerate PO, SOB. Unable to walk long distances without getting winded. Abdominal pain is now a 5/10 in severity but spikes. Was just seen at the infusion center, had labs done and a saline drip but was told to still come to the ED. She has diffuse abdominal pain, associated with nausea, not passing gas. No fever. Never had a colonoscopy. Has not been eating well for last 15 days. Anorexia. Last  chemotherapy 2/1/20. No recent sick contacts. No recent travel. (04 Feb 2020 13:28)  2/9: Pt seen and examined, still has nausea, passing gas, still has diarrhea. No fever, tolerating diet. Off PPN.  ROS:.  [X] A ten-point review of systems was otherwise negative except as noted.  Systemic:	[ ] Fever	[ ] Chills	[ ] Night sweats    [ ] Fatigue	[ ] Other  [] Cardiovascular:  [] Pulmonary:  [] Renal/Urologic:  [] Gastrointestinal: abdominal pain, vomiting  [] Metabolic:  [] Neurologic:  [] Hematologic:  [] ENT:  [] Ophthalmologic:  [] Musculoskeletal:    [ ] Due to altered mental status/intubation, subjective information were not able to be obtained from the patient. History was obtained, to the extent possible, from review of the chart and collateral sources of information.    All other system review is negative .  PAST MEDICAL & SURGICAL HISTORY:  Ovarian cancer  Constipation  Peritoneal carcinoma  Omental metastasis  No significant past surgical history    FAMILY HISTORY:  FH: heart attack    Social History:     Alcohol: Denied  Smoking: Denied  Drug Use: Denied        Vital Signs Last 24 Hrs  T(C): 36.7 (09 Feb 2020 08:27), Max: 36.9 (08 Feb 2020 21:31)  T(F): 98.1 (09 Feb 2020 08:27), Max: 98.4 (08 Feb 2020 21:31)  HR: 67 (09 Feb 2020 08:27) (67 - 69)  BP: 120/60 (09 Feb 2020 08:27) (120/60 - 132/69)  BP(mean): --  RR: 16 (09 Feb 2020 08:27) (16 - 17)  SpO2: 96% (09 Feb 2020 08:27) (95% - 96%)    I&O's Summary    08 Feb 2020 07:01  -  09 Feb 2020 07:00  --------------------------------------------------------  IN: 240 mL / OUT: 0 mL / NET: 240 mL        PHYSICAL EXAM:  Constitutional: NAD, GCS: 15/15  AOX3  Eyes:  WNL  ENMT:  WNL  Neck:  WNL, non tender  Back: Non tender  Respiratory: CTABL  Cardiovascular:  S1+S2+0  Gastrointestinal: Soft, ND , NT  Genitourinary:  WNL  Extremities: NV intact  Vascular:  Intact  Neurological: No focal neurological deficit,  CN, motor and sensory system grossly intact.  Skin: WNL  Musculoskeletal: WNL  Psychiatric: Grossly WNL        Labs:        02-08    138  |  107  |  18  ----------------------------<  138<H>  4.1   |  25  |  0.65    Ca    8.3<L>      08 Feb 2020 09:29  Phos  2.7     02-08  Mg     1.7     02-08    TPro  5.7<L>  /  Alb  2.2<L>  /  TBili  0.2  /  DBili  x   /  AST  26  /  ALT  44  /  AlkPhos  104  02-08      < from: Xray Small Bowel Series (02.05.20 @ 14:51) >    IMPRESSION:    No evidence of bowel obstruction.  Contrast opacification of the colon at 4 hours.        < end of copied text >

## 2020-02-09 NOTE — PROGRESS NOTE ADULT - ASSESSMENT
65 Y old female with metastatic ovarian CA with malignant SBO,  now resolved, malnourished on calorie count, off PPN, passing gas, no SBO on SBS, diarrhea    IVL  Advance diet as tolerated  Monitor diarrhea  Calorie count   GI following   Oncology following   Stable from surgical stand point  SBO resolved.  Transfer to medicine Oncology  if  need ot stay for malignancy related weakness, nausea, clinically no SBo .  D/C planning pending medical clearance pt feels she is too weak to go home .

## 2020-02-09 NOTE — PROGRESS NOTE ADULT - SUBJECTIVE AND OBJECTIVE BOX
64 y/o female with recent of primary peritoneal CA with omental mets,  s/p chemotherapy   admitted with SBO    NGT now out after improvement.   PPn Dced  patient on advancing diet    c/o persistent abdominal cramping  not feeling well  nauseous      PAST MEDICAL & SURGICAL HISTORY:  Constipation  Peritoneal carcinoma  Omental metastasis  No significant past surgical history      REVIEW OF SYSTEMS    weak  nausea  abdominal pain++        PHYSICAL EXAM:    frail  pale  BS+  chest clear  H1 H2      < from: CT Abdomen and Pelvis w/ IV Cont (02.04.20 @ 17:24) >  EXAM:  CT ABDOMEN AND PELVIS IC                            PROCEDURE DATE:  02/04/2020          INTERPRETATION:  CLINICAL INFORMATION: Diffuse abdominal pain. Ovarian cancer.    COMPARISON: None.    PROCEDURE:   CT of the Abdomen and Pelvis was performed with intravenous contrast.   Intravenous contrast: 90 ml Omnipaque 350. 10 ml discarded.  Oral contrast: None.  Sagittal and coronal reformats were performed.    FINDINGS:    LOWER CHEST: Within normal limits.    LIVER: Subcapsular hepatic implants, measuring up to 1.5 x 1.4 cm mass along the posterior right hepatic lobe.  BILE DUCTS: Normal caliber.  GALLBLADDER: Within normal limits.  SPLEEN: Within normal limits.  PANCREAS: Within normal limits.  ADRENALS: Within normal limits.  KIDNEYS/URETERS: Left parapelvic cysts. Probable left renal cysts.    BLADDER: Within normal limits.  REPRODUCTIVE ORGANS: Calcified right uterine leiomyoma. Heterogeneous appearance of the uterus. Cystic/solid right adnexal lesion, measuring 2.5 cm.    BOWEL: High-grade small bowel obstruction, with transition point in the anterior left lower abdomen at the site subserosal peritoneum disease. Colonic diverticulosis.  PERITONEUM: Peritoneal carcinomatosis. Small volume abdominopelvic ascites.  VESSELS: Atherosclerotic changes.  RETROPERITONEUM/LYMPH NODES: No lymphadenopathy.    ABDOMINAL WALL: Within normal limits.  BONES: Degenerative changes.    IMPRESSION:     High-grade small bowel obstruction, with transition point in the anterior left lower abdomen at the site subserosal peritoneum disease.    Peritoneal carcinomatosis, including subcapsular hepatic implants.    < end of copied text >

## 2020-02-10 NOTE — CHART NOTE - NSCHARTNOTEFT_GEN_A_CORE
Calorie Count Day 2    Calorie count sheet not filled out  However pt reports that she is feeling much better, PO intake has improved  Nausea has resolved  Pt on full liquid diet.  on 2/9, pt consumed 1/2 beef broth  1/2 chicken broth  milkshakes x 2  Pt is receiving chewable antacids, and says they improve her tolerance of food  This AM, she is eating more food, eating oatmeal, she wants her milkshakes, and will  ask for yogurt  Will discontinue Ensure, as she says it does not agree with her.  Suggest advance diet to regular      RECOMMENDATIONS:  1) provide milkshake in place of ensure  2) consider advancing diet to regular    Suggest follow up tomorrow to ascertain PO intake.   At this point, pt consumes about 25% nutritional needs, but intake likely to improve  greatly today based on pt's condition and her appetite.          Estimated nutrition needs based on Adjusted BW 69Kg:  (25-30calories/kg) 5760-0061 calories  (1.4-1.6gm protein/kg)  gm protein  (25-30ml/kg) 1750-2100ml/daily.

## 2020-02-10 NOTE — PROGRESS NOTE ADULT - SUBJECTIVE AND OBJECTIVE BOX
INTERVAL HISTORY:    Feeling better this AM  No N/V  Less abdominal pain  Moving bowels  NGT out      Allergies    codeine (Unknown)  mineral oil (Rash)  shellfish (Unknown)    Intolerances        MEDICATIONS  (STANDING):  fentaNYL   Patch  12 MICROgram(s)/Hr 1 Patch Transdermal every 72 hours  heparin  Injectable 5000 Unit(s) SubCutaneous every 8 hours  melatonin 5 milliGRAM(s) Oral at bedtime  pantoprazole  Injectable 40 milliGRAM(s) IV Push <User Schedule>    MEDICATIONS  (PRN):  morphine  - Injectable 2 milliGRAM(s) IV Push every 4 hours PRN Moderate Pain (4 - 6)      Vital Signs Last 24 Hrs  T(C): 37.2 (10 Feb 2020 08:24), Max: 37.2 (10 Feb 2020 08:24)  T(F): 98.9 (10 Feb 2020 08:24), Max: 98.9 (10 Feb 2020 08:24)  HR: 70 (10 Feb 2020 08:24) (68 - 70)  BP: 117/66 (10 Feb 2020 08:24) (117/66 - 120/63)  BP(mean): --  RR: 18 (10 Feb 2020 08:24) (17 - 18)  SpO2: 94% (10 Feb 2020 08:24) (94% - 94%)    PHYSICAL EXAM:    GENERAL: NAD,   HEAD:  Atraumatic, Normocephalic  EYES: EOMI, PERRLA, conjunctiva and sclera clear    NECK: Supple, No JVD, Normal thyroid  NERVOUS SYSTEM:    CHEST/LUNG: Clear to percussion bilaterally; No rales, rhonchi,   HEART: Regular rate and rhythm;   ABDOMEN: Soft, Nontender.  EXTREMITIES:   edema:-  LYMPH: No lymphadenopathy noted                          RADIOLOGY & ADDITIONAL STUDIES:    PATHOLOGY:

## 2020-02-10 NOTE — PROGRESS NOTE ADULT - ASSESSMENT
A/P:  Resolving/resolved SBO, resumption of bowel function  Trial of diet advancement to regular  Monitor bowel function  GI/DVT prophylaxis  Pain control  Serial abd exams  Oncology on consult, peritoneal carcinomatosis  Hospitalist on consult for medical management   Cont current care and [facility]   for d/c planning  Pt aware of and agrees with all of the above

## 2020-02-10 NOTE — PROGRESS NOTE ADULT - ASSESSMENT
Ovarian cancer / Peritoneal carcinomatosis  S/P Taxol Carbo x 1  SBO / partial; doing well clinically    A/P    Next chemotherapy in 2 weeks  Will need port placement Cn be arranged out patient if she cannot have the procedure during this stay  FU with Surgery

## 2020-02-10 NOTE — PROGRESS NOTE ADULT - SUBJECTIVE AND OBJECTIVE BOX
CC:Patient is a 65y old  Female who presents with a chief complaint of Abdominal pain (10 Feb 2020 10:52)      Subjective:  Pt seen and examined at bedside with chaperone. Pt is AAOx3, pt in no acute distress. Pt denied c/o fever, chills, chest pain, SOB, abd pain, N/V/D, extremity pain or dysfunction, hemoptysis, hematemesis, hematuria, hematochexia, headache, diplopia, vertigo, dizzyness. Pt tolerating diet, (+) void, (+) ambulation, (+) bowel function of flatus and bm, pt states c/o chronic h/o gas (pt wants gas-x as per her home use)    ROS:  otherwise as abovementioned ROS    Vital Signs Last 24 Hrs  T(C): 37.2 (10 Feb 2020 08:24), Max: 37.2 (10 Feb 2020 08:24)  T(F): 98.9 (10 Feb 2020 08:24), Max: 98.9 (10 Feb 2020 08:24)  HR: 70 (10 Feb 2020 08:24) (68 - 70)  BP: 117/66 (10 Feb 2020 08:24) (117/66 - 120/63)  BP(mean): --  RR: 18 (10 Feb 2020 08:24) (17 - 18)  SpO2: 94% (10 Feb 2020 08:24) (94% - 94%)    Labs:                                11.9   5.19  )-----------( 242      ( 10 Feb 2020 12:52 )             36.6     CBC Full  -  ( 10 Feb 2020 12:52 )  WBC Count : 5.19 K/uL  RBC Count : 4.00 M/uL  Hemoglobin : 11.9 g/dL  Hematocrit : 36.6 %  Platelet Count - Automated : 242 K/uL  Mean Cell Volume : 91.5 fl  Mean Cell Hemoglobin : 29.8 pg  Mean Cell Hemoglobin Concentration : 32.5 gm/dL  Auto Neutrophil # : x  Auto Lymphocyte # : x  Auto Monocyte # : x  Auto Eosinophil # : x  Auto Basophil # : x  Auto Neutrophil % : x  Auto Lymphocyte % : x  Auto Monocyte % : x  Auto Eosinophil % : x  Auto Basophil % : x    02-10    137  |  103  |  17  ----------------------------<  99  4.2   |  29  |  0.85    Ca    8.6      10 Feb 2020 12:52              Meds:  fentaNYL   Patch  12 MICROgram(s)/Hr 1 Patch Transdermal every 72 hours  heparin  Injectable 5000 Unit(s) SubCutaneous every 8 hours  melatonin 5 milliGRAM(s) Oral at bedtime  morphine  - Injectable 2 milliGRAM(s) IV Push every 4 hours PRN  pantoprazole  Injectable 40 milliGRAM(s) IV Push <User Schedule>  simethicone 80 milliGRAM(s) Chew every 8 hours PRN      Radiology:  < from: Xray Abdomen 2 Views (02.09.20 @ 13:51) >  EXAM:  XR ABDOMEN 2V                            PROCEDURE DATE:  02/09/2020          INTERPRETATION:  Abdomen two views    HISTORY: Small bowel obstruction    Comparison: 2/5/2020    Frontal and upright views of the abdomen shows a normal gas pattern. Nasogastric tube is been removed. Contrast filled gallbladder is again identified.  No definite free air is identified.    IMPRESSION:  No evidence of bowel obstruction.    Thank you for this referral.                  DEEPTHI CHRISTIANSEN M.D., ATTENDING RADIOLOGIST  This document has been electronically signed. Feb 10 2020  8:14AM                < end of copied text >      Physical exam:  Pt is aaox3  Pt in no acute distress  Psych: normal affect  Resp: CTAB  CVS: S1S2(+)  ABD: bowel sounds (+), soft, non distended, no rebound, no guarding, no rigidity, no skin changes to exam. No tenderness to exam  EXT: no calf tenderness or edema to exam b/l, on VTE prophylaxis  Skin: no adverse skin changes to exam

## 2020-02-11 NOTE — DISCHARGE NOTE PROVIDER - HOSPITAL COURSE
Pt presented with bowel obstruction, peritoneal carcinomatosis. Pt demonstrated resolved bowel obstruction with non operative management. Pt tolerating diet with resumption of bowel function. Oncology and hospitalist on consult for medical management

## 2020-02-11 NOTE — DISCHARGE NOTE NURSING/CASE MANAGEMENT/SOCIAL WORK - PATIENT PORTAL LINK FT
You can access the FollowMyHealth Patient Portal offered by [facility] by registering at the following website: [email or web address]. By joining [employer]’s FollowMyHealth portal, you will also be able to view your health information using other applications (apps) compatible with our system.

## 2020-02-11 NOTE — DISCHARGE NOTE PROVIDER - PROVIDER TOKENS
PROVIDER:[TOKEN:[[id number]],FOLLOWUP:[1 week]],PROVIDER:[TOKEN:[[id number]],FOLLOWUP:[1 week]],FREE:[LAST:[Primary medical doctor],PHONE:[(   )    -],FAX:[(   )    -],FOLLOWUP:[1-3 days]]

## 2020-02-11 NOTE — DISCHARGE NOTE PROVIDER - NSDCMRMEDTOKEN_GEN_ALL_CORE_FT
acetaminophen 325 mg oral tablet: 2 tab(s) orally every 6 hours, As needed, Moderate Pain (4 - 6)  fentaNYL 12 mcg/hr transdermal film, extended release: 1 patch transdermal every 72 hours MDD:1 q 3 days  simethicone 80 mg oral tablet, chewable: 1 tab(s) orally every 8 hours, As needed, Gas  Zofran ODT 4 mg oral tablet, disintegratin tab(s) orally 3 times a day  PRn as needed

## 2020-02-11 NOTE — PROGRESS NOTE ADULT - REASON FOR ADMISSION
Abdominal pain
bowel obstruction
Abdominal pain
Intestinal Obstruction

## 2020-02-11 NOTE — DISCHARGE NOTE PROVIDER - CARE PROVIDERS DIRECT ADDRESSES
[email or web address],DirectAddress_Unknown,DirectAddress_Unknown

## 2020-02-11 NOTE — PROGRESS NOTE ADULT - ASSESSMENT
A/P:  Resolved SBO, resumption of bowel function  Tolerating Diet  GI/DVT prophylaxis  Pain control  Serial abd exams benign  Oncology on consult, peritoneal carcinomatosis, oupt follow up  Hospitalist on consult for medical management   Cont current care and [facility]   for d/c planning  Pt stable and cleared for d/c from surgical standpoint  Pt aware of and agrees with all of the above

## 2020-02-11 NOTE — DISCHARGE NOTE PROVIDER - NSDCFUADDINST_GEN_ALL_CORE_FT
Please seek immediate medical attention for worsening abdominal pain, inability to tolerate diet or pass flatus/bowels, chest pain, shortness of breath, any adverse changes to health

## 2020-02-11 NOTE — DISCHARGE NOTE PROVIDER - NSDCCPCAREPLAN_GEN_ALL_CORE_FT
PRINCIPAL DISCHARGE DIAGNOSIS  Diagnosis: SBO (small bowel obstruction)  Assessment and Plan of Treatment:       SECONDARY DISCHARGE DIAGNOSES  Diagnosis: Peritoneal carcinoma  Assessment and Plan of Treatment:     Diagnosis: Ovarian cancer  Assessment and Plan of Treatment:     Diagnosis: Omental metastasis  Assessment and Plan of Treatment:

## 2020-02-11 NOTE — PROGRESS NOTE ADULT - SUBJECTIVE AND OBJECTIVE BOX
CC:Patient is a 65y old  Female who presents with a chief complaint of Abdominal pain, bowel obstruction (11 Feb 2020 12:32)      Subjective:  Pt seen and examined at bedside with chaperone. Pt is AAOx3, pt in no acute distress. Pt denied c/o fever, chills, chest pain, SOB, abd pain, N/V/D, extremity pain or dysfunction, hemoptysis, hematemesis, hematuria, hematochexia, headache, diplopia, vertigo, dizzyness. Pt tolerating diet, (+) void, (+) ambulation, (+) bowel function    ROS:  otherwise as abovementioned ROS    Vital Signs Last 24 Hrs  T(C): 37.1 (11 Feb 2020 08:16), Max: 37.1 (11 Feb 2020 08:16)  T(F): 98.7 (11 Feb 2020 08:16), Max: 98.7 (11 Feb 2020 08:16)  HR: 72 (11 Feb 2020 08:16) (72 - 76)  BP: 116/55 (11 Feb 2020 08:16) (115/63 - 116/55)  BP(mean): --  RR: 18 (11 Feb 2020 08:16) (18 - 18)  SpO2: 94% (11 Feb 2020 08:16) (94% - 95%)    Labs:                                11.9   5.19  )-----------( 242      ( 10 Feb 2020 12:52 )             36.6     CBC Full  -  ( 10 Feb 2020 12:52 )  WBC Count : 5.19 K/uL  RBC Count : 4.00 M/uL  Hemoglobin : 11.9 g/dL  Hematocrit : 36.6 %  Platelet Count - Automated : 242 K/uL  Mean Cell Volume : 91.5 fl  Mean Cell Hemoglobin : 29.8 pg  Mean Cell Hemoglobin Concentration : 32.5 gm/dL  Auto Neutrophil # : x  Auto Lymphocyte # : x  Auto Monocyte # : x  Auto Eosinophil # : x  Auto Basophil # : x  Auto Neutrophil % : x  Auto Lymphocyte % : x  Auto Monocyte % : x  Auto Eosinophil % : x  Auto Basophil % : x    02-10    137  |  103  |  17  ----------------------------<  99  4.2   |  29  |  0.85    Ca    8.6      10 Feb 2020 12:52              Meds:  heparin  Injectable 5000 Unit(s) SubCutaneous every 8 hours  melatonin 5 milliGRAM(s) Oral at bedtime  morphine  - Injectable 2 milliGRAM(s) IV Push every 4 hours PRN  pantoprazole  Injectable 40 milliGRAM(s) IV Push <User Schedule>  simethicone 80 milliGRAM(s) Chew every 8 hours PRN      Radiology:      Physical exam:  Pt is aaox3  Pt in no acute distress  Psych: normal affect  Resp: CTAB  CVS: S1S2(+)  ABD: bowel sounds (+), soft, non distended, no rebound, no guarding, no rigidity, no skin changes to exam. No tenderness to exam  EXT: no calf tenderness or edema to exam b/l, on VTE prophylaxis  Skin: no adverse skin changes to exam

## 2020-02-20 PROBLEM — C56.9 MALIGNANT NEOPLASM OF UNSPECIFIED OVARY: Chronic | Status: ACTIVE | Noted: 2020-01-01

## 2020-03-02 NOTE — BRIEF OPERATIVE NOTE - NSICDXBRIEFPROCEDURE_GEN_ALL_CORE_FT
PROCEDURES:  Insertion, central venous access device, using fluoroscopic guidance 02-Mar-2020 11:28:11  Cory Figueroa

## 2020-03-02 NOTE — ASU PATIENT PROFILE, ADULT - FALL HARM RISK
AMARI she called over the weekend. She said her Lexapro was making her feel funny. She denied HI or SI. I told her to continue on it if possible and to follow-up with you this week.
weak/other

## 2020-03-03 NOTE — ED STATDOCS - OBJECTIVE STATEMENT
66 y/o female with PMHx of peritoneal carcinoma on chemo and SBO presents to the ED sent by Dr. Fish for evaluation of +abd pain. Notes +constipation x5 days and +nausea as well. Had SBO relieved by NG tube in Feb. No fever or vomiting.

## 2020-03-03 NOTE — ED STATDOCS - PATIENT PORTAL LINK FT
You can access the FollowMyHealth Patient Portal offered by [facility] by registering at the following website: [email or web address]. By joining [employer]’s FollowMyHealth portal, you will also be able to view your health information using other applications (apps) compatible with our system.

## 2020-03-03 NOTE — ED STATDOCS - CLINICAL SUMMARY MEDICAL DECISION MAKING FREE TEXT BOX
Neg BM in ED.  Will DC with Magnesium Citrate, Laxative.  Follow up with GI.  Return precautions presented.  Attending aware of results and agreed with plan of care.  Dolly Rios PA-C

## 2020-03-03 NOTE — ED STATDOCS - PROGRESS NOTE DETAILS
64 y/o female with PMHx of peritoneal carcinoma on chemo and SBO presents to the ED sent by Dr. Fish for evaluation of +abd pain. Notes +constipation x5 days and +nausea as well. Had SBO relieved by NG tube in Feb. No fever or vomiting.   Dolly Rios PA-C Rosa n r/o SBO.  Hx peritoneal carcinoma on chemo.  Dolly Rios PA-C CT with improving carcinoma.  Neg. bowel obstruction but high fecal impaction.  Pt. requesting food.  No pain at present.  Will provide enema.  Dolly Rios PA-C Neg BM in ED.  Will DC with Magnesium Citrate, Laxative.  Follow up with GI.  Return precautions presented.  Attending aware of results and agreed with plan of care.  Dolly Rios PA-C

## 2020-03-03 NOTE — ED ADULT NURSE NOTE - OBJECTIVE STATEMENT
pt presents to ed ambulatory as per md virgen. pt has peritoneal ca on 2 second round of chemo, left port placed yesterday. pt endorsing abdominal pain and constipation x 5 days associated with nausea. pt endorses similar issue in february requiring ng tube for SBO. pt vitals stable in ed, afebrile. denies nausea at moment,. no vomiting.

## 2020-03-03 NOTE — ED ADULT NURSE NOTE - NSIMPLEMENTINTERV_GEN_ALL_ED
Implemented All Universal Safety Interventions:  [place] to call system. Call bell, personal items and telephone within reach. Instruct patient to call for assistance. Room bathroom lighting operational. Non-slip footwear when patient is off stretcher. Physically safe environment: no spills, clutter or unnecessary equipment. Stretcher in lowest position, wheels locked, appropriate side rails in place.

## 2020-03-03 NOTE — ED ADULT TRIAGE NOTE - STATUS:
Pt. Arrives to ED with left, upper dental pain. Pain only present with drinking/eating. Had filling placed on 3/29, was eating popcorn on 3/30 \"and I thought I had a kernel stuck but when I went to remove it with my nail, the actual tooth started to come out. I think they drilled a hole in my molar when they put my filling in.\"   
[employer]

## 2020-03-03 NOTE — ED STATDOCS - CARE PROVIDER_API CALL
Munir Severino)  Gastroenterology; Internal Medicine  [address], [address]  [place] [address]  Phone: [phone]  Fax: [phone]  Follow Up Time:

## 2020-03-03 NOTE — ED STATDOCS - CARE PLAN
Principal Discharge DX:	Constipation  Secondary Diagnosis:	Abdominal pain  Secondary Diagnosis:	Peritoneal carcinoma

## 2020-03-03 NOTE — ED ADULT TRIAGE NOTE - CHIEF COMPLAINT QUOTE
Pt sent in by MD Fish for constipation x 4 days.  Pt reports LLQ tenderness, intermittent nausea x 4 days.  Denies diarrhea, vomiting.

## 2020-06-18 PROBLEM — R94.31 ABNORMAL ECG: Status: ACTIVE | Noted: 2020-01-01

## 2020-06-18 NOTE — HISTORY OF PRESENT ILLNESS
[FreeTextEntry1] : 64 y/o w/ ovarian cancer, HLP referred for preop eval.\par \par Seen in cardiology clinic '14 for chest pain & palpitations\par Workup was negative.  Stress test showed rate related atypical left bundle branch block.  Perfusion scan neg for ischemia.\par Pt has ECG in preop clinic w/ LBBB. Office ECG today shows same findings.\par \par Pt denies chest pain, palpitations, dyspnea, syncope or other cardiac symptoms.\par Functional capacity is > 8 METs.\par \par Planned surgery is exploratory laparatomy, [id number]-[id number] w/ omentectomy, hysterectomy, enterectomy, ileostomy/jejunostomy.\par \par Cardiac testing:\par *ECGs'14,'20: NSR, LBBB\par *24-hour Holter monitor '14: occasional PVCs and PACs.\par The short and very brief four beat run of SVT,Totally asymptomatic.\par *Echo '14": EF was 55% with normal valves (normal pulmonary pressures).\par *Nuclear stress '14: negative for ischemia. Good exercise tolerance. No arrhythmia with exercise.  No chest pain with exercise. \par \par Past Med/SurgHx: medical probs, h/o shoulder surgery\par

## 2020-06-18 NOTE — ASSESSMENT
[FreeTextEntry1] : A/P:\par \par *Preoperative evaluation for overan cancer surgery\par *Abnormal ECG w/ LBBB previously described as rate related, now persistent.\par \par Echo today shows normal EF septal wall motion abnormality c/w LBBB\par  & no pulmonary hypertension.\par \par RCRI=0.\par May proceed w/ surgery w/o further cardiac testing.\par Overall low risk for cardiac events for intermediate risk surgery.

## 2020-06-18 NOTE — PHYSICAL EXAM
[General Appearance - Well Developed] : well developed [Normal Appearance] : normal appearance [General Appearance - Well Nourished] : well nourished [Well Groomed] : well groomed [General Appearance - In No Acute Distress] : no acute distress [No Deformities] : no deformities [Normal Conjunctiva] : the conjunctiva exhibited no abnormalities [Eyelids - No Xanthelasma] : the eyelids demonstrated no xanthelasmas [Normal Oral Mucosa] : normal oral mucosa [No Oral Pallor] : no oral pallor [No Oral Cyanosis] : no oral cyanosis [Normal Jugular Venous A Waves Present] : normal jugular venous A waves present [Normal Jugular Venous V Waves Present] : normal jugular venous V waves present [No Jugular Venous Nelson A Waves] : no jugular venous nelson A waves [Exaggerated Use Of Accessory Muscles For Inspiration] : no accessory muscle use [Respiration, Rhythm And Depth] : normal respiratory rhythm and effort [Auscultation Breath Sounds / Voice Sounds] : lungs were clear to auscultation bilaterally [Heart Rate And Rhythm] : heart rate and rhythm were normal [Heart Sounds] : normal S1 and S2 [Murmurs] : no murmurs present [Abdomen Soft] : soft [Abdomen Tenderness] : non-tender [Abdomen Mass (___ Cm)] : no abdominal mass palpated [Gait - Sufficient For Exercise Testing] : the gait was sufficient for exercise testing [Abnormal Walk] : normal gait [Nail Clubbing] : no clubbing of the fingernails [Petechial Hemorrhages (___cm)] : no petechial hemorrhages [Cyanosis, Localized] : no localized cyanosis [Skin Color & Pigmentation] : normal skin color and pigmentation [] : no rash [No Venous Stasis] : no venous stasis [Skin Lesions] : no skin lesions [No Skin Ulcers] : no skin ulcer [No Xanthoma] : no  xanthoma was observed [Oriented To Time, Place, And Person] : oriented to person, place, and time [Affect] : the affect was normal [Mood] : the mood was normal [No Anxiety] : not feeling anxious

## 2020-07-23 NOTE — PHYSICAL EXAM
[General Appearance - Alert] : alert [] : no respiratory distress [General Appearance - In No Acute Distress] : in no acute distress [Auscultation Breath Sounds / Voice Sounds] : lungs were clear to auscultation bilaterally [Heart Sounds] : normal S1 and S2 [Heart Rate And Rhythm] : heart rate was normal and rhythm regular [Heart Sounds Gallop] : no gallops [Heart Sounds Pericardial Friction Rub] : no pericardial rub [Murmurs] : no murmurs [Abnormal Walk] : normal gait [Musculoskeletal - Swelling] : no joint swelling seen [Nail Clubbing] : no clubbing  or cyanosis of the fingernails [Motor Tone] : muscle strength and tone were normal [FreeTextEntry1] : well healed midline scar, nontender

## 2020-07-23 NOTE — HISTORY OF PRESENT ILLNESS
[de-identified] : Ms. VALERIE NORTON is a 65 year old female with history of ovarian cancer status post chemotherapy and surgery. Patient had large exploratory laparotomy without bowel resection. Patient has noted ongoing problems with constipation. The patient underwent normal colonoscopy one year ago by report. There's been no nausea or vomiting. Patient is scheduled for chemotherapy tomorrow.\par

## 2020-07-23 NOTE — ASSESSMENT
[FreeTextEntry1] : 66 yo female with history of chronic constipation and history of ovarian cancer. Will review outside reports, and obtain Xray of abdomen. MOM tonight. Start trial of Linzess 145 micrograms. Follow up in two weeks to consider colonoscopy.

## 2020-08-12 PROBLEM — C56.9 OVARIAN CARCINOMA: Status: ACTIVE | Noted: 2020-01-01

## 2020-08-12 PROBLEM — K59.09 CHRONIC CONSTIPATION: Status: ACTIVE | Noted: 2020-01-01

## 2020-08-12 NOTE — HISTORY OF PRESENT ILLNESS
[de-identified] : Ms. VALERIE NORTON is a 65 year old female with history of ovarian cancer status post chemotherapy and surgery. Patient had large exploratory laparotomy without bowel resection. Patient has noted ongoing problems with constipation. The patient underwent normal colonoscopy one year ago by report. \par Patient currently feels well. Moving bowels with Linzess and occasional supplemental Miralax. Weight is trending up. Patient is scheduled for one cycle of chemotherapy, and then CT scan for staging.

## 2020-08-12 NOTE — PHYSICAL EXAM
[General Appearance - Alert] : alert [] : no respiratory distress [General Appearance - In No Acute Distress] : in no acute distress [Auscultation Breath Sounds / Voice Sounds] : lungs were clear to auscultation bilaterally [Heart Rate And Rhythm] : heart rate was normal and rhythm regular [Heart Sounds Gallop] : no gallops [Heart Sounds] : normal S1 and S2 [Heart Sounds Pericardial Friction Rub] : no pericardial rub [Murmurs] : no murmurs [Nail Clubbing] : no clubbing  or cyanosis of the fingernails [FreeTextEntry1] : well healed midline scar, nontender [Abnormal Walk] : normal gait [Motor Tone] : muscle strength and tone were normal [Musculoskeletal - Swelling] : no joint swelling seen

## 2020-08-12 NOTE — ASSESSMENT
[FreeTextEntry1] : 66 yo female with history of ovarian cancer and constipation. Will continue current therapy and follow up with patient after CT scan.

## 2020-08-12 NOTE — REASON FOR VISIT
[Consultation] : a consultation visit [Follow-Up: _____] : a [unfilled] follow-up visit [FreeTextEntry1] : constipation

## 2020-09-17 NOTE — ED STATDOCS - OBJECTIVE STATEMENT
64 y/o female with a PMHx of constipation, omental metastasis, ovarian cancer, peritoneal carcinoma, h/o hysterectomy presents to the ED sent by MD for possible SBO. +nausea, +constipation. Has taken milk of magnesia without improvement. Used Aloe yesterday with some improvement. Last chemo 4 to 5 weeks ago. GI placed pt on Linzess. Last BM PTA. No other complaints at this time. 64 y/o female with a PMHx of constipation, omental metastasis, ovarian cancer, peritoneal carcinoma, h/o hysterectomy presents to the ED sent by MD for possible SBO. +nausea, +constipation. GI placed pt on Linzess. Last BM PTA. Has taken milk of magnesia without improvement. Used Aloe yesterday with some improvement. Last chemo 4 to 5 weeks ago. No other complaints at this time.

## 2020-09-17 NOTE — ED STATDOCS - CLINICAL SUMMARY MEDICAL DECISION MAKING FREE TEXT BOX
signed Christi Glover PA-C Pt seen initially in intake by Dr Coyle.   59F sent by Maria Fish to r/o SBO. No obstruction on CT, pt notes she has had ascites previously. No significant findings on labwork. Pt feeling well, will DC home as per her request, she has an appt with Dr Fish tomorrow. return precautions given. Pt feeling well at DC, agrees with DC and plan of care.

## 2020-09-17 NOTE — ED STATDOCS - PATIENT PORTAL LINK FT
You can access the FollowMyHealth Patient Portal offered by [facility] by registering at the following website: [email or web address]. By joining [employer]’s FollowMyHealth portal, you will also be able to view your health information using other applications (apps) compatible with our system.

## 2020-09-17 NOTE — ED STATDOCS - NSFOLLOWUPINSTRUCTIONS_ED_ALL_ED_FT
Acute Abdominal Pain    WHAT YOU NEED TO KNOW:    The cause of your abdominal pain may not be found. If a cause is found, treatment will depend on what the cause is.     DISCHARGE INSTRUCTIONS:    Return to the emergency department if:     You vomit blood or cannot stop vomiting.      You have blood in your bowel movement or it looks like tar.       You have bleeding from your rectum.       Your abdomen is larger than usual, more painful, and hard.       You have severe pain in your abdomen.       You stop passing gas and having bowel movements.       You feel weak, dizzy, or faint.    Contact your healthcare provider if:     You have a fever.      You have new signs and symptoms.      Your symptoms do not get better with treatment.       You have questions or concerns about your condition or care.    Medicines may be given to decrease pain, treat an infection, and manage your symptoms. Take your medicine as directed. Call your healthcare provider if you think your medicine is not helping or if you have side effects. Tell him if you are allergic to any medicine. Keep a list of the medicines, vitamins, and herbs you take. Include the amounts, and when and why you take them. Bring the list or the pill bottles to follow-up visits. Carry your medicine list with you in case of an emergency.    Manage your symptoms:     Apply heat on your abdomen for 20 to 30 minutes every 2 hours for as many days as directed. Heat helps decrease pain and muscle spasms.       Manage your stress. Stress may cause abdominal pain. Your healthcare provider may recommend relaxation techniques and deep breathing exercises to help decrease your stress. Your healthcare provider may recommend you talk to someone about your stress or anxiety, such as a counselor or a trusted friend. Get plenty of sleep and exercise regularly.       Limit or do not drink alcohol. Alcohol can make your abdominal pain worse. Ask your healthcare provider if it is safe for you to drink alcohol. Also ask how much is safe for you to drink.       Do not smoke. Nicotine and other chemicals in cigarettes can damage your esophagus and stomach. Ask your healthcare provider for information if you currently smoke and need help to quit. E-cigarettes or smokeless tobacco still contain nicotine. Talk to your healthcare provider before you use these products.     Make changes to the food you eat as directed: Do not eat foods that cause abdominal pain or other symptoms. Eat small meals more often.     Eat more high-fiber foods if you are constipated. High-fiber foods include fruits, vegetables, whole-grain foods, and legumes.       Do not eat foods that cause gas if you have bloating. Examples include broccoli, cabbage, and cauliflower. Do not drink soda or carbonated drinks, because these may also cause gas.       Do not eat foods or drinks that contain sorbitol or fructose if you have diarrhea and bloating. Some examples are fruit juices, candy, jelly, and sugar-free gum.       Do not eat high-fat foods, such as fried foods, cheeseburgers, hot dogs, and desserts.      Limit or do not drink caffeine. Caffeine may make symptoms, such as heart burn or nausea, worse.       Drink plenty of liquids to prevent dehydration from diarrhea or vomiting. Ask your healthcare provider how much liquid to drink each day and which liquids are best for you.     Follow up with your healthcare provider as directed: Write down your questions so you remember to ask them during your visits.    FOLLOW UP WITH DR DE LA ROSA TOMORROW. RETURN TO ER FOR ANY WORSENING SYMPTOMS OR NEW CONCERNS.

## 2020-09-17 NOTE — ED ADULT TRIAGE NOTE - CHIEF COMPLAINT QUOTE
pt sent in by md george for possible SBO, nausea x 2 weeks, last oral intake around 11AM.  hx of ovarian CA

## 2020-09-17 NOTE — ED ADULT NURSE NOTE - OBJECTIVE STATEMENT
64 y/o female with a PMHx of constipation, omental metastasis, ovarian cancer, peritoneal carcinoma, h/o hysterectomy presents to the ED sent by MD for possible SBO. +nausea, +constipation. GI placed pt on Linzess. Last BM PTA. Has taken milk of magnesia without improvement. Used Aloe yesterday with some improvement. Last chemo 4 to 5 weeks ago. No other complaints at this time.

## 2020-10-12 NOTE — H&P ADULT - PROBLEM SELECTOR PLAN 1
admit to onc floor  onc cs to begin chemo  check AM labs  monitor vitals  pain control  prn antiemetics  consider palliative cs

## 2020-10-12 NOTE — ED ADULT TRIAGE NOTE - CHIEF COMPLAINT QUOTE
pt sent in by PMD for admission for chemo tx of ovarian CA with metastases. Pt kali fever, cough, chest pain, SOB.

## 2020-10-12 NOTE — ED PROVIDER NOTE - OBJECTIVE STATEMENT
66 y/o female with a PMHx of ovarian CA, omental metastasis, peritoneal carcinoma, presents ambulatory to the ED sent by oncologist Dr. Fish for admission for new chemotherapy treatment. Pt reports a Hx of ovarian CA, called Dr. Fish today and was sent to the ED for admission for a new platinum resistant ovarian cancer treatment. States she has been through 8 sessions of carboplatin chemotherapy. Pt states she was seen at [facility] on 10/01 with ascites and had a paracentesis with 2.47L of fluid drained. No recent fever. Pt notes recent decreased PO intake. Notes b/l foot swelling that she noticed yesterday. No other complaints at this time. PCP: Dr. Cynthia Cuadra. Oncologist: Dr. Fish. 64 y/o female with a PMHx of ovarian CA, omental metastasis, peritoneal carcinoma, presents ambulatory to the ED sent by oncologist Dr. Fish for admission for new chemotherapy treatment. Pt reports a Hx of ovarian CA with metastasis, called Dr. Fish today and was sent to the ED for admission for a new platinum resistant ovarian cancer treatment. States she has completed 8 sessions of carboplatin chemotherapy. Pt states she was seen at [facility] on 10/01 with ascites and had a paracentesis with 2.47L of fluid drained. No recent fever. Pt notes recent decreased PO intake. Notes b/l foot swelling that she noticed yesterday. No other complaints at this time. PCP: Dr. Cynthia Cuadra. Oncologist: Dr. Fish.

## 2020-10-12 NOTE — H&P ADULT - PROBLEM SELECTOR PLAN 5
likely 2/2 ovarian ca  pt already on lovenox 60mg, so less likely dvt, but given malignancy, will check b/l LE doppler to eval for dvt

## 2020-10-12 NOTE — ED PROVIDER NOTE - PROGRESS NOTE DETAILS
Krystal Harry for attending Dr. Shetty: Spoke to Dr. Fish, pt to be admitted for dehydration and evaluated for starting a new chemo.

## 2020-10-12 NOTE — ED ADULT NURSE NOTE - OBJECTIVE STATEMENT
Pt is a 65y female, A & o x 3, VSS presents to ED for new chemo regime, sent by oncologist. Pt has generalized pain, states from hx of CA with mets. Has port on left chest wall, last accessed on Friday. Pt declined peripheral IV access. Requests to use port only. MD Santana aware.

## 2020-10-12 NOTE — H&P ADULT - ASSESSMENT
65F w/PMH ovarian CA, omental mets, peritoneal ca, presents to ED to start new inpt chemo as sent in by her oncologist, Dr Fish.    recently, paracentesis w/ 2.4L drained.  ++ diffuse abd pain that is constant, + b/l LE swelling which she's never had before.    +thrush  In ED, received 1L ivf.

## 2020-10-12 NOTE — H&P ADULT - HISTORY OF PRESENT ILLNESS
HPI:  65F w/PMH ovarian CA, omental mets, peritoneal ca, presents to ED to start new inpt chemo as sent in by her oncologist, Dr Fish.  She was recently at [facility] and had paracentesis w/ 2.4L drained.  She endorses diffuse abd pain that is constant for which she has fentanyl patch and takes tylenol.  Pain was 3/10 and currently 1/10 after taking tylenol.  She also endorses recent b/l LE swelling which she's never had before.  She is on lovenox 60mg Qdaily.  She denies any fever/chills, cp, sob, n/v/d, dysuria.      In ED, received 1L ivf.      PAST MEDICAL & SURGICAL HISTORY:  Ovarian cancer  Constipation  Peritoneal carcinoma  Omental metastasis    No significant past surgical history        Review of Systems:   CONSTITUTIONAL: No fever.  EYES: No eye pain or discharge.  ENMT:  No sinus or throat pain  NECK: No pain or stiffness  RESPIRATORY: No cough, wheezing, chills or hemoptysis; No shortness of breath  CARDIOVASCULAR: No chest pain, palpitations, dizziness, or leg swelling  GASTROINTESTINAL: ++ abdominal pain. No nausea, vomiting, or hematemesis; No diarrhea or constipation. No melena or hematochezia.  GENITOURINARY: No dysuria or incontinence  NEUROLOGICAL: No headaches, memory loss, loss of strength, numbness, or tremors  SKIN: No rashes.  MUSCULOSKELETAL: No joint pain or swelling; No muscle, back, or extremity pain  PSYCHIATRIC: ++ depression, No anxiety, mood swings, or difficulty sleeping      Allergies  codeine (Unknown)  mineral oil (Rash)  shellfish (Unknown)      Social History:   lives w/   ind ADLs  denies smoking/etoh    FAMILY HISTORY:  FH: heart attack        Home Medications:  acetaminophen 325 mg oral tablet: 2 tab(s) orally every 6 hours, As needed, Moderate Pain (4 - 6) (02 Mar 2020 10:03)  simethicone 80 mg oral tablet, chewable: 1 tab(s) orally every 8 hours, As needed, Gas (02 Mar 2020 10:03)      MEDICATIONS  (STANDING):  polyethylene glycol 3350 17 Gram(s) Oral daily    MEDICATIONS  (PRN):  acetaminophen   Tablet .. 650 milliGRAM(s) Oral every 4 hours PRN Mild Pain (1 - 3)  morphine  - Injectable 2 milliGRAM(s) IV Push every 4 hours PRN Moderate Pain (4 - 6)  ondansetron Injectable 4 milliGRAM(s) IV Push every 6 hours PRN Nausea      PHYSICAL EXAM:  Vital Signs Last 24 Hrs  T(C): 36.9 (12 Oct 2020 16:00), Max: 36.9 (12 Oct 2020 16:00)  T(F): 98.4 (12 Oct 2020 16:00), Max: 98.4 (12 Oct 2020 16:00)  HR: 93 (12 Oct 2020 16:00) (93 - 93)  BP: 126/72 (12 Oct 2020 16:00) (126/72 - 126/72)  BP(mean): --  RR: 17 (12 Oct 2020 16:00) (17 - 17)  SpO2: 96% (12 Oct 2020 16:00) (96% - 96%)  GENERAL: NAD, well-developed  HEAD:  Atraumatic, Normocephalic  EYES: EOMI, PERRLA, conjunctiva and sclera clear  ENT: normal hearing, no nasal discharge, throat clear, dentition normal, +THRUSH  NECK: Supple, No JVD, no LAD, no thyromegaly   CHEST/LUNG: Clear to auscultation bilaterally; No wheeze, respirations unlabored  HEART: Regular rate and rhythm; No murmurs, rubs, or gallops  ABDOMEN: Soft, +DIFFUSE TTP, MILDLY distended; Bowel sounds present,  EXTREMITIES:  2+ Peripheral Pulses, No clubbing, cyanosis, ++ LE nonpitting b/l edema  PSYCH: AAOx3, normal behavior  NEUROLOGY: non-focal, sensory and cn 2-12 intact, speech/language intact  SKIN: No visible rashes or lesions    LABS:                        10.7   5.13  )-----------( 115      ( 12 Oct 2020 16:59 )             32.0     10-12    139  |  104  |  11  ----------------------------<  102<H>  3.8   |  28  |  0.64    Ca    9.2      12 Oct 2020 16:59    TPro  6.5  /  Alb  2.5<L>  /  TBili  0.3  /  DBili  x   /  AST  17  /  ALT  12  /  AlkPhos  68  10-12    PT/INR - ( 12 Oct 2020 16:59 )   PT: 13.2 sec;   INR: 1.14 ratio     PTT - ( 12 Oct 2020 16:59 )  PTT:33.4 sec    Urinalysis Basic - ( 12 Oct 2020 17:15 )  Color: Yellow / Appearance: Clear / S.025 / pH: x  Gluc: x / Ketone: Large  / Bili: Small / Urobili: 1 mg/dL   Blood: x / Protein: 15 mg/dL / Nitrite: Negative   Leuk Esterase: Trace / RBC: 0-2 /HPF / WBC 0-2   Sq Epi: x / Non Sq Epi: Few / Bacteria: Occasional        RADIOLOGY & ADDITIONAL TESTS:    Imaging Personally Reviewed:  cxr neg   EKG Personally Reviewed:  n/a  Consultant(s) Notes Reviewed:    n/a  Care Discussed with Consultants/Other Providers:  ED

## 2020-10-12 NOTE — H&P ADULT - PROBLEM SELECTOR PLAN 3
2/2 above  pain control w/ fentanyl patch, prn tylenol and IV morphine  if worsens, may need repeat para

## 2020-10-13 NOTE — CONSULT NOTE ADULT - PROBLEM SELECTOR RECOMMENDATION 9
Platinum resistant ovarian cancer; Progression; plan was salvage therapy or clinical trial/ she has opted out of trial option at this time/ will proceed with Doxil infusion during this admission

## 2020-10-13 NOTE — PROVIDER CONTACT NOTE (OTHER) - BACKGROUND
Patient has a history of Ovarian CA, peritoneal carcinoma and omental metastasis. Pt was admitted inpatient chemo. Pt is also on 60mg Lovenox daily, no current order made Dr. Mishra aware.

## 2020-10-13 NOTE — PROGRESS NOTE ADULT - ASSESSMENT
65F w/PMH ovarian CA, omental mets, peritoneal ca, presents to ED to start new inpt chemo as sent in by her oncologist, Dr Fish.    recently, paracentesis w/ 2.4L drained.  ++ diffuse abd pain that is constant, + b/l LE swelling which she's never had before.    +thrush     Problem/Plan - 1:  ·  Problem: Ovarian   onc cs to begin chemo  check AM labs  monitor vitals  pain control  prn antiemetics  consider palliative cs.      Problem/Plan - 2:  ·  Problem: Ascites, malignant.    -monitor, may need paracentesis.      Problem/Plan - 3:  ·  Problem: Generalized abdominal pain.  Plan:   2/2 above  pain control w/ fentanyl patch, prn tylenol and IV morphine  if worsens, may need repeat para.      Problem/Plan - 4:  ·  Problem: Thrush.  Plan:   will start on nystatin S&S  monitor for resolution  pt denies any odynophagia.     #4B- Hypokalemia  -replete now and check am labs.      Problem/Plan - 5:  ·  Problem: Leg swelling.  Plan:   likely 2/2 ovarian ca  pt already on lovenox 60mg, so less likely dvt, but given malignancy, will check b/l LE doppler to eval for dvt- pending       Problem/Plan - 6:  Problem: Prophylactic measure. Plan: on lovenox - c/w home dose. 65F w/PMH ovarian CA, omental mets, peritoneal ca, presents to ED to start new inpt chemo as sent in by her oncologist, Dr Fish.    recently, paracentesis w/ 2.4L drained.  ++ diffuse abd pain that is constant, + b/l LE swelling which she's never had before.    +thrush     Problem/Plan - 1:  ·  Problem: Ovarian  cancer  onc cs to begin chemo  check AM labs  monitor vitals  pain control  prn antiemetics  consider palliative cs.      Problem/Plan - 2:  ·  Problem: Ascites, malignant.    -monitor, may need paracentesis.      Problem/Plan - 3:  ·  Problem: Generalized abdominal pain.  Plan:   2/2 above  pain control w/ fentanyl patch, prn tylenol and IV morphine  if worsens, may need repeat para.      Problem/Plan - 4:  ·  Problem: Thrush.  Plan:   will start on nystatin S&S  monitor for resolution  pt denies any odynophagia.     #4B- Hypokalemia  -replete now and check am labs.      Problem/Plan - 5:  ·  Problem: Leg swelling.  Plan:   likely 2/2 ovarian ca  pt already on lovenox 60mg, so less likely dvt, but given malignancy, will check b/l LE doppler to eval for dvt- pending       Problem/Plan - 6:  Problem: Prophylactic measure. Plan: on lovenox - c/w home dose.

## 2020-10-13 NOTE — DIETITIAN INITIAL EVALUATION ADULT. - ENERGY NEEDS
Ht.  70    "        Wt.       68 kg               BMI  21.6                IBW    68   kg               Pt is at 100    %  IBW

## 2020-10-13 NOTE — DIETITIAN INITIAL EVALUATION ADULT. - ADD RECOMMEND
Record PO intake in EMR after each meal (nursing.) add MVI w minerals.  add gelatein bid.  Encourage PO intake.   Monitor PO intake, tolerance, labs and weight.

## 2020-10-13 NOTE — DIETITIAN INITIAL EVALUATION ADULT. - PERTINENT MEDS FT
MEDICATIONS  (STANDING):  enoxaparin Injectable 40 milliGRAM(s) SubCutaneous daily  fentaNYL   Patch  12 MICROgram(s)/Hr 1 Patch Transdermal every 72 hours  influenza   Vaccine 0.5 milliLiter(s) IntraMuscular once  nystatin    Suspension 377592 Unit(s) Oral two times a day  polyethylene glycol 3350 17 Gram(s) Oral daily    MEDICATIONS  (PRN):  acetaminophen   Tablet .. 650 milliGRAM(s) Oral every 4 hours PRN Mild Pain (1 - 3)  aluminum hydroxide/magnesium hydroxide/simethicone Suspension 30 milliLiter(s) Oral every 4 hours PRN Dyspepsia  morphine  - Injectable 2 milliGRAM(s) IV Push every 4 hours PRN Moderate Pain (4 - 6)  ondansetron Injectable 4 milliGRAM(s) IV Push every 6 hours PRN Nausea

## 2020-10-13 NOTE — DIETITIAN INITIAL EVALUATION ADULT. - PERTINENT LABORATORY DATA
10-12 Na139 mmol/L Glu 102 mg/dL<H> K+ 3.8 mmol/L Cr  0.64 mg/dL BUN 11 mg/dL Phos n/a   Alb 2.5 g/dL<L> PAB n/a

## 2020-10-13 NOTE — PROVIDER CONTACT NOTE (OTHER) - ASSESSMENT
Patient has chronic Left facial pain and is requesting pain medication. Pt has been receiving 5mg Oxycodone PRN, order fell off

## 2020-10-13 NOTE — CHART NOTE - NSCHARTNOTEFT_GEN_A_CORE
· Malnutrition  Pt meets criteria for severe protein-calorie malnutrition in context of chronic disease.    PO intake < 75% nutritional needs > one month (6 months).    NFPE reveals moderate muscle wasting (temples, thighs, calves)   further muscle wasting may be masked by edema in LE,  moderate/severe muscle wasting (clavicles, shoulders, scapulas.)    Moderate/severe fat wasting (buccal, triceps, ribs.)    Further wt loss may be masked by ascites, edema.  Edema 3+ right and left ankle, moving up to calves

## 2020-10-13 NOTE — CHART NOTE - TREATMENT: THE FOLLOWING DIET HAS BEEN RECOMMENDED
Diet, Regular (10-12-20 @ 20:27) [Active]  Add gelatein bid  Pt rejects Ensure  New weight,   weekly weights  add MVI w minerals

## 2020-10-13 NOTE — CONSULT NOTE ADULT - SUBJECTIVE AND OBJECTIVE BOX
HPI:  HPI:  65F w/PMH ovarian CA, omental mets, peritoneal ca, presents to ED to start new inpt chemo as sent in by her oncologist, Dr Fish.  She was recently at [facility] and had paracentesis w/ 2.4L drained.  She endorses diffuse abd pain that is constant for which she has fentanyl patch and takes tylenol.  Pain was 3/10 and currently 1/10 after taking tylenol.  She also endorses recent b/l LE swelling which she's never had before.  She is on lovenox 60mg Qdaily.  She denies any fever/chills, cp, sob, n/v/d, dysuria.      In ED, received 1L ivf.      PAST MEDICAL & SURGICAL HISTORY:  Ovarian cancer  Constipation  Peritoneal carcinoma  Omental metastasis    No significant past surgical history        Review of Systems:   CONSTITUTIONAL: No fever.  EYES: No eye pain or discharge.  ENMT:  No sinus or throat pain  NECK: No pain or stiffness  RESPIRATORY: No cough, wheezing, chills or hemoptysis; No shortness of breath  CARDIOVASCULAR: No chest pain, palpitations, dizziness, or leg swelling  GASTROINTESTINAL: ++ abdominal pain. No nausea, vomiting, or hematemesis; No diarrhea or constipation. No melena or hematochezia.  GENITOURINARY: No dysuria or incontinence  NEUROLOGICAL: No headaches, memory loss, loss of strength, numbness, or tremors  SKIN: No rashes.  MUSCULOSKELETAL: No joint pain or swelling; No muscle, back, or extremity pain  PSYCHIATRIC: ++ depression, No anxiety, mood swings, or difficulty sleeping      Allergies  codeine (Unknown)  mineral oil (Rash)  shellfish (Unknown)      Social History:   lives w/   ind ADLs  denies smoking/etoh    FAMILY HISTORY:  FH: heart attack        Home Medications:  acetaminophen 325 mg oral tablet: 2 tab(s) orally every 6 hours, As needed, Moderate Pain (4 - 6) (02 Mar 2020 10:03)  simethicone 80 mg oral tablet, chewable: 1 tab(s) orally every 8 hours, As needed, Gas (02 Mar 2020 10:03)      MEDICATIONS  (STANDING):  polyethylene glycol 3350 17 Gram(s) Oral daily    MEDICATIONS  (PRN):  acetaminophen   Tablet .. 650 milliGRAM(s) Oral every 4 hours PRN Mild Pain (1 - 3)  morphine  - Injectable 2 milliGRAM(s) IV Push every 4 hours PRN Moderate Pain (4 - 6)  ondansetron Injectable 4 milliGRAM(s) IV Push every 6 hours PRN Nausea      PHYSICAL EXAM:  Vital Signs Last 24 Hrs  T(C): 36.9 (12 Oct 2020 16:00), Max: 36.9 (12 Oct 2020 16:00)  T(F): 98.4 (12 Oct 2020 16:00), Max: 98.4 (12 Oct 2020 16:00)  HR: 93 (12 Oct 2020 16:00) (93 - 93)  BP: 126/72 (12 Oct 2020 16:00) (126/72 - 126/72)  BP(mean): --  RR: 17 (12 Oct 2020 16:00) (17 - 17)  SpO2: 96% (12 Oct 2020 16:00) (96% - 96%)  GENERAL: NAD, well-developed  HEAD:  Atraumatic, Normocephalic  EYES: EOMI, PERRLA, conjunctiva and sclera clear  ENT: normal hearing, no nasal discharge, throat clear, dentition normal, +THRUSH  NECK: Supple, No JVD, no LAD, no thyromegaly   CHEST/LUNG: Clear to auscultation bilaterally; No wheeze, respirations unlabored  HEART: Regular rate and rhythm; No murmurs, rubs, or gallops  ABDOMEN: Soft, +DIFFUSE TTP, MILDLY distended; Bowel sounds present,  EXTREMITIES:  2+ Peripheral Pulses, No clubbing, cyanosis, ++ LE nonpitting b/l edema  PSYCH: AAOx3, normal behavior  NEUROLOGY: non-focal, sensory and cn 2-12 intact, speech/language intact  SKIN: No visible rashes or lesions    LABS:                        10.7   5.13  )-----------( 115      ( 12 Oct 2020 16:59 )             32.0     10-12    139  |  104  |  11  ----------------------------<  102<H>  3.8   |  28  |  0.64    Ca    9.2      12 Oct 2020 16:59    TPro  6.5  /  Alb  2.5<L>  /  TBili  0.3  /  DBili  x   /  AST  17  /  ALT  12  /  AlkPhos  68  10-12    PT/INR - ( 12 Oct 2020 16:59 )   PT: 13.2 sec;   INR: 1.14 ratio     PTT - ( 12 Oct 2020 16:59 )  PTT:33.4 sec    Urinalysis Basic - ( 12 Oct 2020 17:15 )  Color: Yellow / Appearance: Clear / S.025 / pH: x  Gluc: x / Ketone: Large  / Bili: Small / Urobili: 1 mg/dL   Blood: x / Protein: 15 mg/dL / Nitrite: Negative   Leuk Esterase: Trace / RBC: 0-2 /HPF / WBC 0-2   Sq Epi: x / Non Sq Epi: Few / Bacteria: Occasional        RADIOLOGY & ADDITIONAL TESTS:    Imaging Personally Reviewed:  cxr neg   EKG Personally Reviewed:  n/a  Consultant(s) Notes Reviewed:    n/a  Care Discussed with Consultants/Other Providers:  ED (12 Oct 2020 20:28)      PAST MEDICAL & SURGICAL HISTORY:  Ovarian cancer    Constipation    Peritoneal carcinoma    Omental metastasis    No significant past surgical history        MEDICATIONS  (STANDING):  enoxaparin Injectable 40 milliGRAM(s) SubCutaneous daily  fentaNYL   Patch  12 MICROgram(s)/Hr 1 Patch Transdermal every 72 hours  FIRST- Mouthwash  BLM 10 milliLiter(s) Swish and Spit two times a day  influenza   Vaccine 0.5 milliLiter(s) IntraMuscular once  multivitamin/minerals 1 Tablet(s) Oral daily  pantoprazole    Tablet 40 milliGRAM(s) Oral before breakfast  polyethylene glycol 3350 17 Gram(s) Oral daily  potassium chloride   Powder 20 milliEquivalent(s) Oral every 4 hours    MEDICATIONS  (PRN):  acetaminophen   Tablet .. 650 milliGRAM(s) Oral every 4 hours PRN Mild Pain (1 - 3)  aluminum hydroxide/magnesium hydroxide/simethicone Suspension 30 milliLiter(s) Oral every 4 hours PRN Dyspepsia  morphine  - Injectable 2 milliGRAM(s) IV Push every 4 hours PRN Moderate Pain (4 - 6)  ondansetron Injectable 4 milliGRAM(s) IV Push every 6 hours PRN Nausea      Allergies    codeine (Unknown)  mineral oil (Rash)  shellfish (Unknown)    Intolerances        SOCIAL HISTORY:    FAMILY HISTORY:  FH: heart attack        Vital Signs Last 24 Hrs  T(C): 36.4 (13 Oct 2020 15:43), Max: 36.7 (12 Oct 2020 23:58)  T(F): 97.6 (13 Oct 2020 15:43), Max: 98 (12 Oct 2020 23:58)  HR: 79 (13 Oct 2020 15:43) (79 - 87)  BP: 127/74 (13 Oct 2020 15:43) (127/74 - 154/81)  BP(mean): 91 (13 Oct 2020 09:26) (91 - 96)  RR: 18 (13 Oct 2020 15:43) (17 - 18)  SpO2: 97% (13 Oct 2020 15:43) (96% - 99%)      LABS:                        10.0   4.02  )-----------( 105      ( 13 Oct 2020 06:56 )             30.6     10-13    138  |  105  |  8   ----------------------------<  87  3.3<L>   |  24  |  0.50    Ca    8.9      13 Oct 2020 06:56    TPro  6.5  /  Alb  2.5<L>  /  TBili  0.3  /  DBili  x   /  AST  17  /  ALT  12  /  AlkPhos  68  10-12    PT/INR - ( 12 Oct 2020 16:59 )   PT: 13.2 sec;   INR: 1.14 ratio         PTT - ( 12 Oct 2020 16:59 )  PTT:33.4 sec  Urinalysis Basic - ( 12 Oct 2020 17:15 )    Color: Yellow / Appearance: Clear / S.025 / pH: x  Gluc: x / Ketone: Large  / Bili: Small / Urobili: 1 mg/dL   Blood: x / Protein: 15 mg/dL / Nitrite: Negative   Leuk Esterase: Trace / RBC: 0-2 /HPF / WBC 0-2   Sq Epi: x / Non Sq Epi: Few / Bacteria: Occasional        RADIOLOGY & ADDITIONAL STUDIES: HPI:  HPI:  65F w/PMH ovarian CA III C s/p NACT [place] [facility] [place] of [place] chemotherapy/ has had a relapse ( omentum) and is Platinum  refractory Was being considered for a clinical [place] [facility] [place] has had a declining PS ; and she had been experiencing increasing abdominal pain, bloating, N/V that was recently alleviated by way of   paracentesis at [facility]  w/ 2.4L drained.  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  EYES: No eye pain or discharge.  ENMT:  No sinus or throat pain  NECK: No pain or stiffness  RESPIRATORY: No cough, wheezing, chills or hemoptysis; No shortness of breath  CARDIOVASCULAR: No chest pain, palpitations, dizziness, or leg swelling  GASTROINTESTINAL: ++ abdominal pain. No nausea, vomiting, or hematemesis; No diarrhea or constipation. No melena or hematochezia.  GENITOURINARY: No dysuria or incontinence  NEUROLOGICAL: No headaches, memory loss, loss of strength, numbness, or tremors  SKIN: No rashes.  MUSCULOSKELETAL: No joint pain or swelling; No muscle, back, or extremity pain  PSYCHIATRIC: ++ depression, No anxiety, mood swings, or difficulty sleeping      Allergies  codeine (Unknown)  mineral oil (Rash)  shellfish (Unknown)      Social History:   lives w/   ind ADLs  denies smoking/etoh    FAMILY HISTORY:  FH: heart attack        Home Medications:  acetaminophen 325 mg oral tablet: 2 tab(s) orally every 6 hours, As needed, Moderate Pain (4 - 6) (02 Mar 2020 10:03)  simethicone 80 mg oral tablet, chewable: 1 tab(s) orally every 8 hours, As needed, Gas (02 Mar 2020 10:03)      MEDICATIONS  (STANDING):  polyethylene glycol 3350 17 Gram(s) Oral daily    MEDICATIONS  (PRN):  acetaminophen   Tablet .. 650 milliGRAM(s) Oral every 4 hours PRN Mild Pain (1 - 3)  morphine  - Injectable 2 milliGRAM(s) IV Push every 4 hours PRN Moderate Pain (4 - 6)  ondansetron Injectable 4 milliGRAM(s) IV Push every 6 hours PRN Nausea      PHYSICAL EXAM:  Vital Signs Last 24 Hrs  T(C): 36.9 (12 Oct 2020 16:00), Max: 36.9 (12 Oct 2020 16:00)  T(F): 98.4 (12 Oct 2020 16:00), Max: 98.4 (12 Oct 2020 16:00)  HR: 93 (12 Oct 2020 16:00) (93 - 93)  BP: 126/72 (12 Oct 2020 16:00) (126/72 - 126/72)  BP(mean): --  RR: 17 (12 Oct 2020 16:00) (17 - 17)  SpO2: 96% (12 Oct 2020 16:00) (96% - 96%)  GENERAL: NAD, well-developed  HEAD:  Atraumatic, Normocephalic  EYES: EOMI, PERRLA, conjunctiva and sclera clear  ENT: normal hearing, no nasal discharge, throat clear, dentition normal, +THRUSH  NECK: Supple, No JVD, no LAD, no thyromegaly   CHEST/LUNG: Clear to auscultation bilaterally; No wheeze, respirations unlabored  HEART: Regular rate and rhythm; No murmurs, rubs, or gallops  ABDOMEN: Soft, +DIFFUSE TTP, MILDLY distended; Bowel sounds present,  EXTREMITIES:  2+ Peripheral Pulses, No clubbing, cyanosis, ++ LE nonpitting b/l edema  PSYCH: AAOx3, normal behavior  NEUROLOGY: non-focal, sensory and cn 2-12 intact, speech/language intact  SKIN: No visible rashes or lesions    LABS:                        10.7   5.13  )-----------( 115      ( 12 Oct 2020 16:59 )             32.0     10-12    139  |  104  |  11  ----------------------------<  102<H>  3.8   |  28  |  0.64    Ca    9.2      12 Oct 2020 16:59    TPro  6.5  /  Alb  2.5<L>  /  TBili  0.3  /  DBili  x   /  AST  17  /  ALT  12  /  AlkPhos  68  10-12    PT/INR - ( 12 Oct 2020 16:59 )   PT: 13.2 sec;   INR: 1.14 ratio     PTT - ( 12 Oct 2020 16:59 )  PTT:33.4 sec    Urinalysis Basic - ( 12 Oct 2020 17:15 )  Color: Yellow / Appearance: Clear / S.025 / pH: x  Gluc: x / Ketone: Large  / Bili: Small / Urobili: 1 mg/dL   Blood: x / Protein: 15 mg/dL / Nitrite: Negative   Leuk Esterase: Trace / RBC: 0-2 /HPF / WBC 0-2   Sq Epi: x / Non Sq Epi: Few / Bacteria: Occasional        RADIOLOGY & ADDITIONAL TESTS:    < from: CT Abdomen and Pelvis w/ Oral Cont and w/ IV Cont (20 @ 17:49) >  EXAM:  CT ABDOMEN AND PELVIS OC IC                            PROCEDURE DATE:  2020          INTERPRETATION:  CLINICAL INFORMATION: Abdominal pain. History of ovarian cancer.    COMPARISON: CT scan abdomen pelvis 3/3/2020.    PROCEDURE:  CT of the Abdomen and Pelvis was performed with intravenous contrast.  Intravenous contrast: 90 ml Omnipaque 350. 10 ml discarded.  Oral contrast: positive contrast was administered.  Sagittal and coronal reformats were performed.    FINDINGS:    LOWER CHEST: Focal pleural calcification left anterior cardiophrenic angle, stable in appearance.    LIVER: Thickening along the peritoneal surface of the right hepatic lobe compatible with metastatic disease.  BILE DUCTS: Normal caliber.  GALLBLADDER: Ascites extending into the posterior hepatic recesses displacing the gallbladder.  SPLEEN: Within normal limits.  PANCREAS: Within normal limits.  ADRENALS: Within normal limits.  KIDNEYS/URETERS: Left parapelvic renal cysts.  Cyst mid pole left kidney stable in appearance.  Tiny indeterminate hypodense lesion mid to upper pole right kidney.    BLADDER: Within normal limits.  REPRODUCTIVE ORGANS: Status post hysterectomy.    BOWEL: No bowel obstruction.  The appendix not adequately visualized on this exam.  PERITONEUM: There is a moderate volume of abdominal ascites with fluid extending into the lesser sac and left posterior hepatic space.  There is thickening and enhancement of the parietal peritoneum.  There is thickening of the omentum along the left abdomen.    There is a 9 mm soft tissue nodule right upper lobe, image 33, series 2; new from prior exam.  There is a 1.5 cm soft tissue nodule along the right posterior flank, image 60, series 2; new from prior exam.    VESSELS: Atherosclerotic calcification.  RETROPERITONEUM/LYMPH NODES: No enlarged retroperitoneal lymphadenopathy.  ABDOMINAL WALL: Periumbilical scarring with mild infiltration of the soft tissues of the anterior abdominal wall.    BONES: Degenerative changes.    IMPRESSION:    Moderate volume of abdominal ascites with evidence of peritoneal carcinomatosis as discussed above.    No bowel obstruction.    Status post hysterectomy.    Other findings as dis    < end of copied text >    PAST MEDICAL & SURGICAL HISTORY:  Ovarian cancer    Constipation    Peritoneal carcinoma    Omental metastasis    No significant past surgical history        MEDICATIONS  (STANDING):  enoxaparin Injectable 40 milliGRAM(s) SubCutaneous daily  fentaNYL   Patch  12 MICROgram(s)/Hr 1 Patch Transdermal every 72 hours  FIRST- Mouthwash  BLM 10 milliLiter(s) Swish and Spit two times a day  influenza   Vaccine 0.5 milliLiter(s) IntraMuscular once  multivitamin/minerals 1 Tablet(s) Oral daily  pantoprazole    Tablet 40 milliGRAM(s) Oral before breakfast  polyethylene glycol 3350 17 Gram(s) Oral daily  potassium chloride   Powder 20 milliEquivalent(s) Oral every 4 hours    MEDICATIONS  (PRN):  acetaminophen   Tablet .. 650 milliGRAM(s) Oral every 4 hours PRN Mild Pain (1 - 3)  aluminum hydroxide/magnesium hydroxide/simethicone Suspension 30 milliLiter(s) Oral every 4 hours PRN Dyspepsia  morphine  - Injectable 2 milliGRAM(s) IV Push every 4 hours PRN Moderate Pain (4 - 6)  ondansetron Injectable 4 milliGRAM(s) IV Push every 6 hours PRN Nausea      Allergies    codeine (Unknown)  mineral oil (Rash)  shellfish (Unknown)    Intolerances        SOCIAL HISTORY:    FAMILY HISTORY:  FH: heart attack        Vital Signs Last 24 Hrs  T(C): 36.4 (13 Oct 2020 15:43), Max: 36.7 (12 Oct 2020 23:58)  T(F): 97.6 (13 Oct 2020 15:43), Max: 98 (12 Oct 2020 23:58)  HR: 79 (13 Oct 2020 15:43) (79 - 87)  BP: 127/74 (13 Oct 2020 15:43) (127/74 - 154/81)  BP(mean): 91 (13 Oct 2020 09:26) (91 - 96)  RR: 18 (13 Oct 2020 15:43) (17 - 18)  SpO2: 97% (13 Oct 2020 15:43) (96% - 99%)      LABS:                        10.0   4.02  )-----------( 105      ( 13 Oct 2020 06:56 )             30.6     10-13    138  |  105  |  8   ----------------------------<  87  3.3<L>   |  24  |  0.50    Ca    8.9      13 Oct 2020 06:56    TPro  6.5  /  Alb  2.5<L>  /  TBili  0.3  /  DBili  x   /  AST  17  /  ALT  12  /  AlkPhos  68  10-12    PT/INR - ( 12 Oct 2020 16:59 )   PT: 13.2 sec;   INR: 1.14 ratio         PTT - ( 12 Oct 2020 16:59 )  PTT:33.4 sec  Urinalysis Basic - ( 12 Oct 2020 17:15 )    Color: Yellow / Appearance: Clear / S.025 / pH: x  Gluc: x / Ketone: Large  / Bili: Small / Urobili: 1 mg/dL   Blood: x / Protein: 15 mg/dL / Nitrite: Negative   Leuk Esterase: Trace / RBC: 0-2 /HPF / WBC 0-2   Sq Epi: x / Non Sq Epi: Few / Bacteria: Occasional         HPI:  HPI:  65F w/PMH ovarian CA III C s/p NACT followed by debulking surgery at [facility] [place] and 3 further cycles of adjuvant chemotherapy/ has had a relapse ( omentum) and is Platinum  refractory Was being considered for a clinical trial at [facility] [place] however has had a declining PS ; and she had been experiencing increasing abdominal pain, bloating, N/V that was recently alleviated by way of   paracentesis at [facility]  w/ 2.4L drained.  She was waiting to start chemotherapy at [facility] [place] hovwer has had  multiple  ongoing symptoms ( poor po intake/ pain/ decreased fluid intake therefore admitted to  for symptom management, hydration, and to start on previously planned chemotherapy    + History of secondary thrombophilia /on Lovenox     PAST MEDICAL & SURGICAL HISTORY:  Ovarian cancer  Constipation  Peritoneal carcinoma  Omental metastasis    PS Debulking surgery / ovarian cancer; [facility] [place]/ [place]     Review of Systems:   CONSTITUTIONAL: No fever.  EYES: No eye pain or discharge.  ENMT:  No sinus or throat pain  NECK: No pain or stiffness  RESPIRATORY: No cough, wheezing, chills or hemoptysis; No shortness of breath  CARDIOVASCULAR: No chest pain, palpitations, dizziness, or leg swelling  GASTROINTESTINAL: ++ abdominal pain. No nausea, vomiting, or hematemesis; No diarrhea or constipation. No melena or hematochezia.  GENITOURINARY: No dysuria or incontinence  NEUROLOGICAL: No headaches, memory loss, loss of strength, numbness, or tremors  SKIN: No rashes.  MUSCULOSKELETAL: No joint pain or swelling; No muscle, back, or extremity pain  PSYCHIATRIC: ++ depression, No anxiety, mood swings, or difficulty sleeping      Allergies  codeine (Unknown)  mineral oil (Rash)  shellfish (Unknown)      Social History:   lives w/   ind ADLs  denies smoking/etoh    FAMILY HISTORY:  FH: heart attack        Home Medications:  acetaminophen 325 mg oral tablet: 2 tab(s) orally every 6 hours, As needed, Moderate Pain (4 - 6) (02 Mar 2020 10:03)  simethicone 80 mg oral tablet, chewable: 1 tab(s) orally every 8 hours, As needed, Gas (02 Mar 2020 10:03)      MEDICATIONS  (STANDING):  polyethylene glycol 3350 17 Gram(s) Oral daily    MEDICATIONS  (PRN):  acetaminophen   Tablet .. 650 milliGRAM(s) Oral every 4 hours PRN Mild Pain (1 - 3)  morphine  - Injectable 2 milliGRAM(s) IV Push every 4 hours PRN Moderate Pain (4 - 6)  ondansetron Injectable 4 milliGRAM(s) IV Push every 6 hours PRN Nausea      PHYSICAL EXAM:  Vital Signs Last 24 Hrs  T(C): 36.9 (12 Oct 2020 16:00), Max: 36.9 (12 Oct 2020 16:00)  T(F): 98.4 (12 Oct 2020 16:00), Max: 98.4 (12 Oct 2020 16:00)  HR: 93 (12 Oct 2020 16:00) (93 - 93)  BP: 126/72 (12 Oct 2020 16:00) (126/72 - 126/72)  BP(mean): --  RR: 17 (12 Oct 2020 16:00) (17 - 17)  SpO2: 96% (12 Oct 2020 16:00) (96% - 96%)  GENERAL: NAD, well-developed  HEAD:  Atraumatic, Normocephalic  EYES: EOMI, PERRLA, conjunctiva and sclera clear  ENT: normal hearing, no nasal discharge, throat clear, dentition normal, +THRUSH  NECK: Supple, No JVD, no LAD, no thyromegaly   CHEST/LUNG: Clear to auscultation bilaterally; No wheeze, respirations unlabored  HEART: Regular rate and rhythm; No murmurs, rubs, or gallops  ABDOMEN: Soft, +DIFFUSE TTP, MILDLY distended; Bowel sounds present,  EXTREMITIES:  2+ Peripheral Pulses, No clubbing, cyanosis, ++ LE nonpitting b/l edema  PSYCH: AAOx3, normal behavior  NEUROLOGY: non-focal, sensory and cn 2-12 intact, speech/language intact  SKIN: No visible rashes or lesions    LABS:                        10.7   5.13  )-----------( 115      ( 12 Oct 2020 16:59 )             32.0     10-12    139  |  104  |  11  ----------------------------<  102<H>  3.8   |  28  |  0.64    Ca    9.2      12 Oct 2020 16:59    TPro  6.5  /  Alb  2.5<L>  /  TBili  0.3  /  DBili  x   /  AST  17  /  ALT  12  /  AlkPhos  68  10-12    PT/INR - ( 12 Oct 2020 16:59 )   PT: 13.2 sec;   INR: 1.14 ratio     PTT - ( 12 Oct 2020 16:59 )  PTT:33.4 sec    Urinalysis Basic - ( 12 Oct 2020 17:15 )  Color: Yellow / Appearance: Clear / S.025 / pH: x  Gluc: x / Ketone: Large  / Bili: Small / Urobili: 1 mg/dL   Blood: x / Protein: 15 mg/dL / Nitrite: Negative   Leuk Esterase: Trace / RBC: 0-2 /HPF / WBC 0-2   Sq Epi: x / Non Sq Epi: Few / Bacteria: Occasional        RADIOLOGY & ADDITIONAL TESTS:    < from: CT Abdomen and Pelvis w/ Oral Cont and w/ IV Cont (20 @ 17:49) >  EXAM:  CT ABDOMEN AND PELVIS OC IC                            PROCEDURE DATE:  2020          INTERPRETATION:  CLINICAL INFORMATION: Abdominal pain. History of ovarian cancer.    COMPARISON: CT scan abdomen pelvis 3/3/2020.    PROCEDURE:  CT of the Abdomen and Pelvis was performed with intravenous contrast.  Intravenous contrast: 90 ml Omnipaque 350. 10 ml discarded.  Oral contrast: positive contrast was administered.  Sagittal and coronal reformats were performed.    FINDINGS:    LOWER CHEST: Focal pleural calcification left anterior cardiophrenic angle, stable in appearance.    LIVER: Thickening along the peritoneal surface of the right hepatic lobe compatible with metastatic disease.  BILE DUCTS: Normal caliber.  GALLBLADDER: Ascites extending into the posterior hepatic recesses displacing the gallbladder.  SPLEEN: Within normal limits.  PANCREAS: Within normal limits.  ADRENALS: Within normal limits.  KIDNEYS/URETERS: Left parapelvic renal cysts.  Cyst mid pole left kidney stable in appearance.  Tiny indeterminate hypodense lesion mid to upper pole right kidney.    BLADDER: Within normal limits.  REPRODUCTIVE ORGANS: Status post hysterectomy.    BOWEL: No bowel obstruction.  The appendix not adequately visualized on this exam.  PERITONEUM: There is a moderate volume of abdominal ascites with fluid extending into the lesser sac and left posterior hepatic space.  There is thickening and enhancement of the parietal peritoneum.  There is thickening of the omentum along the left abdomen.    There is a 9 mm soft tissue nodule right upper lobe, image 33, series 2; new from prior exam.  There is a 1.5 cm soft tissue nodule along the right posterior flank, image 60, series 2; new from prior exam.    VESSELS: Atherosclerotic calcification.  RETROPERITONEUM/LYMPH NODES: No enlarged retroperitoneal lymphadenopathy.  ABDOMINAL WALL: Periumbilical scarring with mild infiltration of the soft tissues of the anterior abdominal wall.    BONES: Degenerative changes.    IMPRESSION:    Moderate volume of abdominal ascites with evidence of peritoneal carcinomatosis as discussed above.    No bowel obstruction.    Status post hysterectomy.    Other findings as dis    < end of copied text >    PAST MEDICAL & SURGICAL HISTORY:  Ovarian cancer    Constipation    Peritoneal carcinoma    Omental metastasis    No significant past surgical history        MEDICATIONS  (STANDING):  enoxaparin Injectable 40 milliGRAM(s) SubCutaneous daily  fentaNYL   Patch  12 MICROgram(s)/Hr 1 Patch Transdermal every 72 hours  FIRST- Mouthwash  BLM 10 milliLiter(s) Swish and Spit two times a day  influenza   Vaccine 0.5 milliLiter(s) IntraMuscular once  multivitamin/minerals 1 Tablet(s) Oral daily  pantoprazole    Tablet 40 milliGRAM(s) Oral before breakfast  polyethylene glycol 3350 17 Gram(s) Oral daily  potassium chloride   Powder 20 milliEquivalent(s) Oral every 4 hours    MEDICATIONS  (PRN):  acetaminophen   Tablet .. 650 milliGRAM(s) Oral every 4 hours PRN Mild Pain (1 - 3)  aluminum hydroxide/magnesium hydroxide/simethicone Suspension 30 milliLiter(s) Oral every 4 hours PRN Dyspepsia  morphine  - Injectable 2 milliGRAM(s) IV Push every 4 hours PRN Moderate Pain (4 - 6)  ondansetron Injectable 4 milliGRAM(s) IV Push every 6 hours PRN Nausea      Allergies    codeine (Unknown)  mineral oil (Rash)  shellfish (Unknown)    Intolerances        SOCIAL HISTORY:    FAMILY HISTORY:  FH: heart attack        Vital Signs Last 24 Hrs  T(C): 36.4 (13 Oct 2020 15:43), Max: 36.7 (12 Oct 2020 23:58)  T(F): 97.6 (13 Oct 2020 15:43), Max: 98 (12 Oct 2020 23:58)  HR: 79 (13 Oct 2020 15:43) (79 - 87)  BP: 127/74 (13 Oct 2020 15:43) (127/74 - 154/81)  BP(mean): 91 (13 Oct 2020 09:26) (91 - 96)  RR: 18 (13 Oct 2020 15:43) (17 - 18)  SpO2: 97% (13 Oct 2020 15:43) (96% - 99%)      LABS:                        10.0   4.02  )-----------( 105      ( 13 Oct 2020 06:56 )             30.6     10-13    138  |  105  |  8   ----------------------------<  87  3.3<L>   |  24  |  0.50    Ca    8.9      13 Oct 2020 06:56    TPro  6.5  /  Alb  2.5<L>  /  TBili  0.3  /  DBili  x   /  AST  17  /  ALT  12  /  AlkPhos  68  10-12    PT/INR - ( 12 Oct 2020 16:59 )   PT: 13.2 sec;   INR: 1.14 ratio         PTT - ( 12 Oct 2020 16:59 )  PTT:33.4 sec  Urinalysis Basic - ( 12 Oct 2020 17:15 )    Color: Yellow / Appearance: Clear / S.025 / pH: x  Gluc: x / Ketone: Large  / Bili: Small / Urobili: 1 mg/dL   Blood: x / Protein: 15 mg/dL / Nitrite: Negative   Leuk Esterase: Trace / RBC: 0-2 /HPF / WBC 0-2   Sq Epi: x / Non Sq Epi: Few / Bacteria: Occasional           HPI:  65F w/PMH ovarian CA III C s/p NACT followed by debulking surgery at [facility] and 3 further cycles of adjuvant chemotherapy/ has had a relapse ( omentum) and is Platinum  refractory Was being considered for a clinical trial at [facility] however has had a declining PS ; and she had been experiencing increasing abdominal pain, bloating, N/V that was recently alleviated by way of   paracentesis at [facility]  w/ 2.4L drained.  She was waiting to start chemotherapy at [facility] hovwer has had  multiple  ongoing symptoms ( poor po intake/ pain/ decreased fluid intake therefore admitted to  for symptom management, hydration, and to start on previously planned chemotherapy    + History of secondary thrombophilia /on Lovenox     PAST MEDICAL & SURGICAL HISTORY:  Ovarian cancer  Constipation  Peritoneal carcinoma  Omental metastasis    PS Debulking surgery / ovarian cancer; [facility]/ [place]     Review of Systems:   CONSTITUTIONAL: No fever.  EYES: No eye pain or discharge.  ENMT:  No sinus or throat pain  NECK: No pain or stiffness  RESPIRATORY: No cough, wheezing, chills or hemoptysis; No shortness of breath  CARDIOVASCULAR: No chest pain, palpitations, dizziness, or leg swelling  GASTROINTESTINAL: ++ abdominal pain. No nausea, vomiting, or hematemesis; No diarrhea or constipation. No melena or hematochezia.  GENITOURINARY: No dysuria or incontinence  NEUROLOGICAL: No headaches, memory loss, loss of strength, numbness, or tremors  SKIN: No rashes.  MUSCULOSKELETAL: No joint pain or swelling; No muscle, back, or extremity pain  PSYCHIATRIC: ++ depression, No anxiety, mood swings, or difficulty sleeping      Allergies  codeine (Unknown)  mineral oil (Rash)  shellfish (Unknown)      Social History:   lives w/   ind ADLs  denies smoking/etoh    FAMILY HISTORY:  FH: heart attack        Home Medications:  acetaminophen 325 mg oral tablet: 2 tab(s) orally every 6 hours, As needed, Moderate Pain (4 - 6) (02 Mar 2020 10:03)  simethicone 80 mg oral tablet, chewable: 1 tab(s) orally every 8 hours, As needed, Gas (02 Mar 2020 10:03)      MEDICATIONS  (STANDING):  polyethylene glycol 3350 17 Gram(s) Oral daily    MEDICATIONS  (PRN):  acetaminophen   Tablet .. 650 milliGRAM(s) Oral every 4 hours PRN Mild Pain (1 - 3)  morphine  - Injectable 2 milliGRAM(s) IV Push every 4 hours PRN Moderate Pain (4 - 6)  ondansetron Injectable 4 milliGRAM(s) IV Push every 6 hours PRN Nausea      PHYSICAL EXAM:  Vital Signs Last 24 Hrs  T(C): 36.9 (12 Oct 2020 16:00), Max: 36.9 (12 Oct 2020 16:00)  T(F): 98.4 (12 Oct 2020 16:00), Max: 98.4 (12 Oct 2020 16:00)  HR: 93 (12 Oct 2020 16:00) (93 - 93)  BP: 126/72 (12 Oct 2020 16:00) (126/72 - 126/72)  BP(mean): --  RR: 17 (12 Oct 2020 16:00) (17 - 17)  SpO2: 96% (12 Oct 2020 16:00) (96% - 96%)  GENERAL: NAD, well-developed  HEAD:  Atraumatic, Normocephalic  EYES: EOMI, PERRLA, conjunctiva and sclera clear  ENT: normal hearing, no nasal discharge, throat clear, dentition normal, +THRUSH  NECK: Supple, No JVD, no LAD, no thyromegaly   CHEST/LUNG: Clear to auscultation bilaterally; No wheeze, respirations unlabored  HEART: Regular rate and rhythm; No murmurs, rubs, or gallops  ABDOMEN: Soft, +DIFFUSE TTP, MILDLY distended; Bowel sounds present,  EXTREMITIES:  2+ Peripheral Pulses, No clubbing, cyanosis, ++ LE nonpitting b/l edema  PSYCH: AAOx3, normal behavior  NEUROLOGY: non-focal, sensory and cn 2-12 intact, speech/language intact  SKIN: No visible rashes or lesions    LABS:                        10.7   5.13  )-----------( 115      ( 12 Oct 2020 16:59 )             32.0     10-12    139  |  104  |  11  ----------------------------<  102<H>  3.8   |  28  |  0.64    Ca    9.2      12 Oct 2020 16:59    TPro  6.5  /  Alb  2.5<L>  /  TBili  0.3  /  DBili  x   /  AST  17  /  ALT  12  /  AlkPhos  68  10-12    PT/INR - ( 12 Oct 2020 16:59 )   PT: 13.2 sec;   INR: 1.14 ratio     PTT - ( 12 Oct 2020 16:59 )  PTT:33.4 sec    Urinalysis Basic - ( 12 Oct 2020 17:15 )  Color: Yellow / Appearance: Clear / S.025 / pH: x  Gluc: x / Ketone: Large  / Bili: Small / Urobili: 1 mg/dL   Blood: x / Protein: 15 mg/dL / Nitrite: Negative   Leuk Esterase: Trace / RBC: 0-2 /HPF / WBC 0-2   Sq Epi: x / Non Sq Epi: Few / Bacteria: Occasional        RADIOLOGY & ADDITIONAL TESTS:    < from: CT Abdomen and Pelvis w/ Oral Cont and w/ IV Cont (20 @ 17:49) >  EXAM:  CT ABDOMEN AND PELVIS OC IC                            PROCEDURE DATE:  2020          INTERPRETATION:  CLINICAL INFORMATION: Abdominal pain. History of ovarian cancer.    COMPARISON: CT scan abdomen pelvis 3/3/2020.    PROCEDURE:  CT of the Abdomen and Pelvis was performed with intravenous contrast.  Intravenous contrast: 90 ml Omnipaque 350. 10 ml discarded.  Oral contrast: positive contrast was administered.  Sagittal and coronal reformats were performed.    FINDINGS:    LOWER CHEST: Focal pleural calcification left anterior cardiophrenic angle, stable in appearance.    LIVER: Thickening along the peritoneal surface of the right hepatic lobe compatible with metastatic disease.  BILE DUCTS: Normal caliber.  GALLBLADDER: Ascites extending into the posterior hepatic recesses displacing the gallbladder.  SPLEEN: Within normal limits.  PANCREAS: Within normal limits.  ADRENALS: Within normal limits.  KIDNEYS/URETERS: Left parapelvic renal cysts.  Cyst mid pole left kidney stable in appearance.  Tiny indeterminate hypodense lesion mid to upper pole right kidney.    BLADDER: Within normal limits.  REPRODUCTIVE ORGANS: Status post hysterectomy.    BOWEL: No bowel obstruction.  The appendix not adequately visualized on this exam.  PERITONEUM: There is a moderate volume of abdominal ascites with fluid extending into the lesser sac and left posterior hepatic space.  There is thickening and enhancement of the parietal peritoneum.  There is thickening of the omentum along the left abdomen.    There is a 9 mm soft tissue nodule right upper lobe, image 33, series 2; new from prior exam.  There is a 1.5 cm soft tissue nodule along the right posterior flank, image 60, series 2; new from prior exam.    VESSELS: Atherosclerotic calcification.  RETROPERITONEUM/LYMPH NODES: No enlarged retroperitoneal lymphadenopathy.  ABDOMINAL WALL: Periumbilical scarring with mild infiltration of the soft tissues of the anterior abdominal wall.    BONES: Degenerative changes.    IMPRESSION:    Moderate volume of abdominal ascites with evidence of peritoneal carcinomatosis as discussed above.    No bowel obstruction.    Status post hysterectomy.    Other findings as dis    < end of copied text >    PAST MEDICAL & SURGICAL HISTORY:  Ovarian cancer    Constipation    Peritoneal carcinoma    Omental metastasis    No significant past surgical history        MEDICATIONS  (STANDING):  enoxaparin Injectable 40 milliGRAM(s) SubCutaneous daily  fentaNYL   Patch  12 MICROgram(s)/Hr 1 Patch Transdermal every 72 hours  FIRST- Mouthwash  BLM 10 milliLiter(s) Swish and Spit two times a day  influenza   Vaccine 0.5 milliLiter(s) IntraMuscular once  multivitamin/minerals 1 Tablet(s) Oral daily  pantoprazole    Tablet 40 milliGRAM(s) Oral before breakfast  polyethylene glycol 3350 17 Gram(s) Oral daily  potassium chloride   Powder 20 milliEquivalent(s) Oral every 4 hours    MEDICATIONS  (PRN):  acetaminophen   Tablet .. 650 milliGRAM(s) Oral every 4 hours PRN Mild Pain (1 - 3)  aluminum hydroxide/magnesium hydroxide/simethicone Suspension 30 milliLiter(s) Oral every 4 hours PRN Dyspepsia  morphine  - Injectable 2 milliGRAM(s) IV Push every 4 hours PRN Moderate Pain (4 - 6)  ondansetron Injectable 4 milliGRAM(s) IV Push every 6 hours PRN Nausea      Allergies    codeine (Unknown)  mineral oil (Rash)  shellfish (Unknown)    Intolerances        SOCIAL HISTORY:    FAMILY HISTORY:  FH: heart attack        Vital Signs Last 24 Hrs  T(C): 36.4 (13 Oct 2020 15:43), Max: 36.7 (12 Oct 2020 23:58)  T(F): 97.6 (13 Oct 2020 15:43), Max: 98 (12 Oct 2020 23:58)  HR: 79 (13 Oct 2020 15:43) (79 - 87)  BP: 127/74 (13 Oct 2020 15:43) (127/74 - 154/81)  BP(mean): 91 (13 Oct 2020 09:26) (91 - 96)  RR: 18 (13 Oct 2020 15:43) (17 - 18)  SpO2: 97% (13 Oct 2020 15:43) (96% - 99%)      LABS:                        10.0   4.02  )-----------( 105      ( 13 Oct 2020 06:56 )             30.6     10-13    138  |  105  |  8   ----------------------------<  87  3.3<L>   |  24  |  0.50    Ca    8.9      13 Oct 2020 06:56    TPro  6.5  /  Alb  2.5<L>  /  TBili  0.3  /  DBili  x   /  AST  17  /  ALT  12  /  AlkPhos  68  10-12    PT/INR - ( 12 Oct 2020 16:59 )   PT: 13.2 sec;   INR: 1.14 ratio         PTT - ( 12 Oct 2020 16:59 )  PTT:33.4 sec  Urinalysis Basic - ( 12 Oct 2020 17:15 )    Color: Yellow / Appearance: Clear / S.025 / pH: x  Gluc: x / Ketone: Large  / Bili: Small / Urobili: 1 mg/dL   Blood: x / Protein: 15 mg/dL / Nitrite: Negative   Leuk Esterase: Trace / RBC: 0-2 /HPF / WBC 0-2   Sq Epi: x / Non Sq Epi: Few / Bacteria: Occasional

## 2020-10-13 NOTE — CONSULT NOTE ADULT - PROBLEM SELECTOR RECOMMENDATION 3
Continue on Lovenox  Would recommend this be continued as out patient given multiple risk factors for thrombotic events

## 2020-10-13 NOTE — DIETITIAN INITIAL EVALUATION ADULT. - MALNUTRITION
Pt meets criteria for severe protein-calorie malnutrition in context of chronic disease.  PO intake < 75% nutritional needs > one month (6 months).  NFPE reveals moderate muscle wasting (temples, thighs, calves) further muscle wasting may be masked by edema in LE, moderate/severe muscle wasting (clavicles, shoulders, scapulas.)  Moderate/severe fat wasting (buccal, triceps, ribs.)  Further wt loss may be masked by ascites, edema.

## 2020-10-13 NOTE — PROGRESS NOTE ADULT - SUBJECTIVE AND OBJECTIVE BOX
[facility] Division of Hospital Medicine  Bala Rene MD  Pager (M-F, 8A-5P): [phone]  Other Times:  [phone]    Patient is a 65y old  Female who presents with a chief complaint of chemo (12 Oct 2020 20:28)      SUBJECTIVE / OVERNIGHT EVENTS:    MEDICATIONS  (STANDING):  enoxaparin Injectable 40 milliGRAM(s) SubCutaneous daily  fentaNYL   Patch  12 MICROgram(s)/Hr 1 Patch Transdermal every 72 hours  influenza   Vaccine 0.5 milliLiter(s) IntraMuscular once  multivitamin/minerals 1 Tablet(s) Oral daily  nystatin    Suspension 045084 Unit(s) Oral two times a day  pantoprazole    Tablet 40 milliGRAM(s) Oral before breakfast  polyethylene glycol 3350 17 Gram(s) Oral daily  potassium chloride    Tablet ER 20 milliEquivalent(s) Oral every 2 hours    MEDICATIONS  (PRN):  acetaminophen   Tablet .. 650 milliGRAM(s) Oral every 4 hours PRN Mild Pain (1 - 3)  aluminum hydroxide/magnesium hydroxide/simethicone Suspension 30 milliLiter(s) Oral every 4 hours PRN Dyspepsia  morphine  - Injectable 2 milliGRAM(s) IV Push every 4 hours PRN Moderate Pain (4 - 6)  ondansetron Injectable 4 milliGRAM(s) IV Push every 6 hours PRN Nausea      CAPILLARY BLOOD GLUCOSE        I&O's Summary      PHYSICAL EXAM:  Vital Signs Last 24 Hrs  T(C): 36.6 (13 Oct 2020 09:26), Max: 36.9 (12 Oct 2020 16:00)  T(F): 97.8 (13 Oct 2020 09:), Max: 98.4 (12 Oct 2020 16:00)  HR: 81 (13 Oct 2020 09:) (81 - 93)  BP: 139/77 (13 Oct 2020 09:) (126/72 - 154/81)  BP(mean): 91 (13 Oct 2020 09:) (91 - 96)  RR: 18 (13 Oct 2020 09:) (17 - 18)  SpO2: 96% (13 Oct 2020 09:) (96% - 99%)  GENERAL: NAD, well-developed  HEAD:  Atraumatic, Normocephalic  EYES: EOMI, PERRLA, conjunctiva and sclera clear  NECK: Supple, No JVD  CHEST/LUNG: Clear to auscultation bilaterally; No wheeze  HEART: Regular rate and rhythm; No murmurs, rubs, or gallops  ABDOMEN: Soft, Nontender, Nondistended; Bowel sounds present  EXTREMITIES:  2+ Peripheral Pulses, No clubbing, cyanosis, or edema  PSYCH: AAOx3  NEUROLOGY: non-focal  SKIN: No rashes or lesions    LABS:                        10.0   4.02  )-----------( 105      ( 13 Oct 2020 06:56 )             30.6     10-13    138  |  105  |  8   ----------------------------<  87  3.3<L>   |  24  |  0.50    Ca    8.9      13 Oct 2020 06:56    TPro  6.5  /  Alb  2.5<L>  /  TBili  0.3  /  DBili  x   /  AST  17  /  ALT  12  /  AlkPhos  68  10-12    PT/INR - ( 12 Oct 2020 16:59 )   PT: 13.2 sec;   INR: 1.14 ratio         PTT - ( 12 Oct 2020 16:59 )  PTT:33.4 sec      Urinalysis Basic - ( 12 Oct 2020 17:15 )    Color: Yellow / Appearance: Clear / S.025 / pH: x  Gluc: x / Ketone: Large  / Bili: Small / Urobili: 1 mg/dL   Blood: x / Protein: 15 mg/dL / Nitrite: Negative   Leuk Esterase: Trace / RBC: 0-2 /HPF / WBC 0-2   Sq Epi: x / Non Sq Epi: Few / Bacteria: Occasional          RADIOLOGY & ADDITIONAL TESTS:    Imaging Personally Reviewed:    Consultant(s) Notes Reviewed:      Care Discussed with Consultants/Other Providers:       Patient is a 65y old  Female who presents with a chief complaint of chemo (12 Oct 2020 20:28)  HPI:  65F w/PMH ovarian CA, omental mets, peritoneal ca, presents to ED to start new inpt chemo as sent in by her oncologist, Dr Fish.  She was recently at [facility] and had paracentesis w/ 2.4L drained.  She endorses diffuse abd pain that is constant for which she has fentanyl patch and takes tylenol.  Pain was 3/10 and currently 1/10 after taking tylenol.  She also endorses recent b/l LE swelling which she's never had before.  She is on lovenox 60mg Qdaily.  She denies any fever/chills, cp, sob, n/v/d, dysuria.      In ED, received 1L ivf.      SUBJECTIVE / OVERNIGHT EVENTS:  10/13: s/e at bedside, doing well, no new c/o or o/n events.  pain controlled.     MEDICATIONS  (STANDING):  enoxaparin Injectable 40 milliGRAM(s) SubCutaneous daily  fentaNYL   Patch  12 MICROgram(s)/Hr 1 Patch Transdermal every 72 hours  influenza   Vaccine 0.5 milliLiter(s) IntraMuscular once  multivitamin/minerals 1 Tablet(s) Oral daily  nystatin    Suspension 816652 Unit(s) Oral two times a day  pantoprazole    Tablet 40 milliGRAM(s) Oral before breakfast  polyethylene glycol 3350 17 Gram(s) Oral daily  potassium chloride    Tablet ER 20 milliEquivalent(s) Oral every 2 hours    MEDICATIONS  (PRN):  acetaminophen   Tablet .. 650 milliGRAM(s) Oral every 4 hours PRN Mild Pain (1 - 3)  aluminum hydroxide/magnesium hydroxide/simethicone Suspension 30 milliLiter(s) Oral every 4 hours PRN Dyspepsia  morphine  - Injectable 2 milliGRAM(s) IV Push every 4 hours PRN Moderate Pain (4 - 6)  ondansetron Injectable 4 milliGRAM(s) IV Push every 6 hours PRN Nausea      PHYSICAL EXAM:  Vital Signs Last 24 Hrs  T(C): 36.6 (13 Oct 2020 09:26), Max: 36.9 (12 Oct 2020 16:00)  T(F): 97.8 (13 Oct 2020 09:26), Max: 98.4 (12 Oct 2020 16:00)  HR: 81 (13 Oct 2020 09:26) (81 - 93)  BP: 139/77 (13 Oct 2020 09:26) (126/72 - 154/81)  BP(mean): 91 (13 Oct 2020 09:26) (91 - 96)  RR: 18 (13 Oct 2020 09:26) (17 - 18)  SpO2: 96% (13 Oct 2020 09:26) (96% - 99%)  GENERAL: NAD, well-developed  HEAD:  Atraumatic, Normocephalic  EYES: EOMI, PERRLA, conjunctiva and sclera clear  ENT: normal hearing, no nasal discharge, throat clear, dentition normal, +THRUSH  NECK: Supple, No JVD, no LAD, no thyromegaly   CHEST/LUNG: Clear to auscultation bilaterally; No wheeze, respirations unlabored  HEART: Regular rate and rhythm; No murmurs, rubs, or gallops  ABDOMEN: Soft, +DIFFUSE TTP, MILDLY distended; Bowel sounds present,  EXTREMITIES:  2+ Peripheral Pulses, No clubbing, cyanosis, ++ LE nonpitting b/l edema  PSYCH: AAOx3, normal behavior  NEUROLOGY: non-focal, sensory and cn 2-12 intact, speech/language intact  SKIN: No visible rashes or lesions    LABS:                        10.0   4.02  )-----------( 105      ( 13 Oct 2020 06:56 )             30.6     10-13    138  |  105  |  8   ----------------------------<  87  3.3<L>   |  24  |  0.50    Ca    8.9      13 Oct 2020 06:56    TPro  6.5  /  Alb  2.5<L>  /  TBili  0.3  /  DBili  x   /  AST  17  /  ALT  12  /  AlkPhos  68  10-12    PT/INR - ( 12 Oct 2020 16:59 )   PT: 13.2 sec;   INR: 1.14 ratio         PTT - ( 12 Oct 2020 16:59 )  PTT:33.4 sec      Urinalysis Basic - ( 12 Oct 2020 17:15 )    Color: Yellow / Appearance: Clear / S.025 / pH: x  Gluc: x / Ketone: Large  / Bili: Small / Urobili: 1 mg/dL   Blood: x / Protein: 15 mg/dL / Nitrite: Negative   Leuk Esterase: Trace / RBC: 0-2 /HPF / WBC 0-2   Sq Epi: x / Non Sq Epi: Few / Bacteria: Occasional          RADIOLOGY & ADDITIONAL TESTS:    Imaging Personally Reviewed:  n/a  Consultant(s) Notes Reviewed:    dietician  Care Discussed with Consultants/Other Providers:  RN

## 2020-10-13 NOTE — DIETITIAN INITIAL EVALUATION ADULT. - PHYSICAL APPEARANCE
other (specify)/Signs of muscle wasting, fat wasting, edema in lower legs Frantz 19  skin intact  Edema 3+ right and left ankle

## 2020-10-13 NOTE — DIETITIAN INITIAL EVALUATION ADULT. - OTHER INFO
65F w/PMH ovarian CA, omental mets, peritoneal ca, presents to ED to start new inpt chemo as sent in by her oncologist, Dr Fish.  She was recently at [facility] and had paracentesis w/ 2.4L drained.  She endorses diffuse abd pain that is constant for which she has fentanyl patch and takes tylenol.  Pain was 3/10 and currently 1/10 after taking tylenol.  She also endorses recent b/l LE swelling which she's never had before.  She is on lovenox 60mg Qdaily.  She denies any fever/chills, cp, sob, n/v/d, dysuria.  dx ascites, thrush, ovarian CA 65F w/PMH ovarian CA, omental mets, peritoneal ca, presents to ED to start new inpt chemo as sent in by her oncologist, Dr Fish.  She was recently at [facility] and had paracentesis w/ 2.4L drained.  She endorses diffuse abd pain that is constant for which she has fentanyl patch and takes tylenol.  Pain was 3/10 and currently 1/10 after taking tylenol.  She also endorses recent b/l LE swelling which she's never had before.  She is on lovenox 60mg Qdaily.  She denies any fever/chills, cp, sob, n/v/d, dysuria.  dx ascites, thrush, ovarian CA  Visited with pt, who recently has paracentesis.  Pt feels more comfortable eating at the moment.  Rejects ensure.  pt is somewhat nauseated.  Mouth feels fine.  Poor PO intake PTA.

## 2020-10-13 NOTE — CONSULT NOTE ADULT - PROBLEM SELECTOR RECOMMENDATION 2
S/P paracentesis with good palliate results; may need future paracentesis contingent on symptoms / recurrence of fluid

## 2020-10-14 NOTE — PROGRESS NOTE ADULT - SUBJECTIVE AND OBJECTIVE BOX
65F w/PMH ovarian CA, omental mets, peritoneal ca, presents to ED to start new inpt chemo as sent in by her oncologist, Dr Fish.  She was recently at [facility] and had paracentesis w/ 2.4L drained.  She endorses diffuse abd pain that is constant for which she has fentanyl patch and takes tylenol.  Pain was 3/10 and currently 1/10 after taking tylenol.  She also endorses recent b/l LE swelling which she's never had before.  She is on lovenox 60mg Qdaily.  She denies any fever/chills, cp, sob, n/v/d, dysuria.  In ED, received 1L ivf.      SUBJECTIVE / OVERNIGHT EVENTS:  10/13: s/e at bedside, doing well, no new c/o or o/n events.  pain controlled.   10/14 - no cp palps sob or abdo pain; last BM yesterday - passing gas     GENERAL: NAD, well-developed  HEAD:  Atraumatic, Normocephalic  EYES: EOMI, PERRLA, conjunctiva and sclera clear  ENT: normal hearing, no nasal discharge, throat clear, dentition normal, +THRUSH  NECK: Supple, No JVD, no LAD, no thyromegaly   CHEST/LUNG: Clear to auscultation bilaterally; No wheeze, respirations unlabored  HEART: Regular rate and rhythm; No murmurs, rubs, or gallops  ABDOMEN: Soft, +DIFFUSE TTP, MILDLY distended; Bowel sounds present,  EXTREMITIES:  2+ Peripheral Pulses, No clubbing, cyanosis, ++ LE nonpitting b/l edema  PSYCH: AAOx3, normal behavior  NEUROLOGY: non-focal, sensory and cn 2-12 intact, speech/language intact  SKIN: No visible rashes or lesions    LABS: All Labs Reviewed:                      10.3   4.13  )-----------( 92       ( 14 Oct 2020 06:42 )             32.2   137  |  103  |  8   ----------------------------<  88  4.2   |  26  |  0.52      acetaminophen   Tablet .. 650 milliGRAM(s) Oral every 4 hours PRN  aluminum hydroxide/magnesium hydroxide/simethicone Suspension 30 milliLiter(s) Oral every 4 hours PRN  diphenhydrAMINE   Injectable 25 milliGRAM(s) IV Push every 6 hours PRN  enoxaparin Injectable 40 milliGRAM(s) SubCutaneous daily  famotidine Injectable 20 milliGRAM(s) IV Push daily  fentaNYL   Patch  12 MICROgram(s)/Hr 1 Patch Transdermal every 72 hours  FIRST- Mouthwash  BLM 10 milliLiter(s) Swish and Spit two times a day  influenza   Vaccine 0.5 milliLiter(s) IntraMuscular once  morphine  - Injectable 2 milliGRAM(s) IV Push every 4 hours PRN  multivitamin/minerals 1 Tablet(s) Oral daily  ondansetron Injectable 4 milliGRAM(s) IV Push every 6 hours PRN  pantoprazole    Tablet 40 milliGRAM(s) Oral before breakfast  polyethylene glycol 3350 17 Gram(s) Oral daily  sodium chloride 0.9% with potassium chloride 20 mEq/L 1000 milliLiter(s) IV Continuous <Continuous>

## 2020-10-14 NOTE — PROGRESS NOTE ADULT - ASSESSMENT
65F w/PMH ovarian CA, omental mets, peritoneal ca, presents to ED to start new inpt chemo as sent in by her oncologist, Dr De La Rosa.    recently, paracentesis w/ 2.4L drained.  ++ diffuse abd pain that is constant, + b/l LE swelling which she's never had before.    +thrush     Problem/Plan - 1:  ·  Problem: Ovarian  cancer  10/14 - CHEMO TODAY - DECADRON/DOXORUBICIN     Problem/Plan - 2:  ·  Problem: Ascites, malignant.    -monitor, may need paracentesis.      Problem/Plan - 3:  ·  Problem: Generalized abdominal pain.  Plan:   2/2 above  pain control w/ fentanyl patch, prn tylenol and IV morphine  if worsens, may need repeat para.   10/14: for nausea, zofrn  AXR - no obstruction  add pepcid IV      Problem/Plan - 4:  ·  Problem: Thrush.  Plan:   will start on nystatin S&S  monitor for resolution  pt denies any odynophagia.      Problem/Plan - 5:  ·  Problem: Leg swelling.  Plan:   likely 2/2 ovarian ca  pt already on lovenox 60mg, so less likely dvt, but given malignancy, will check b/l LE doppler to eval for dvt- pending       Problem/Plan - 6:  Problem: H/O PE Plan: on lovenox - c/w home dose.  PLATELETS LESS THAN 100 so will use LMWH 40  when improved RESUME HOME DOSE LMWH 60       discussed with GI and HEME ONC Dr DE LA ROSA

## 2020-10-14 NOTE — PHARMACOTHERAPY INTERVENTION NOTE - COMMENTS
Medication reconciliation was completed after speaking to the patient and checking with Dr. Dickinson

## 2020-10-14 NOTE — PROGRESS NOTE ADULT - PROBLEM SELECTOR PLAN 2
S/P paracentesis at [facility]/ will monitor; may need future paracentesis; we may start Bevacizumab next cycle; may benefit in terms of fluid accumulation

## 2020-10-14 NOTE — PROGRESS NOTE ADULT - SUBJECTIVE AND OBJECTIVE BOX
INTERVAL HISTORY:        HPI:  65F w/PMH ovarian CA III C s/p NACT followed by debulking surgery at [facility] and 3 further cycles of adjuvant chemotherapy/ has had a relapse ( omentum) and is Platinum  refractory Was being considered for a clinical trial at [facility] however has had a declining PS ; and she had been experiencing increasing abdominal pain, bloating, N/V that was recently alleviated by way of   paracentesis at [facility]  w/ 2.4L drained.  She was waiting to start chemotherapy at [facility] hovwer has had  multiple  ongoing symptoms ( poor po intake/ pain/ decreased fluid intake therefore admitted to  for symptom management, hydration, and to start on previously planned chemotherapy/ has been hydrated overnight; this am has nausea ( has been having chronic nausea)  To start Doxil today        PAST MEDICAL & SURGICAL HISTORY:  Ovarian cancer  Constipation  Peritoneal carcinoma  Omental metastasis        REVIEW OF SYSTEMS:      Allergies    codeine (Unknown)  mineral oil (Rash)  shellfish (Unknown)    Intolerances        MEDICATIONS  (STANDING):  enoxaparin Injectable 40 milliGRAM(s) SubCutaneous daily  famotidine Injectable 20 milliGRAM(s) IV Push daily  fentaNYL   Patch  12 MICROgram(s)/Hr 1 Patch Transdermal every 72 hours  FIRST- Mouthwash  BLM 10 milliLiter(s) Swish and Spit two times a day  influenza   Vaccine 0.5 milliLiter(s) IntraMuscular once  multivitamin/minerals 1 Tablet(s) Oral daily  pantoprazole    Tablet 40 milliGRAM(s) Oral before breakfast  polyethylene glycol 3350 17 Gram(s) Oral daily  sodium chloride 0.9% with potassium chloride 20 mEq/L 1000 milliLiter(s) (60 mL/Hr) IV Continuous <Continuous>    MEDICATIONS  (PRN):  acetaminophen   Tablet .. 650 milliGRAM(s) Oral every 4 hours PRN Mild Pain (1 - 3)  aluminum hydroxide/magnesium hydroxide/simethicone Suspension 30 milliLiter(s) Oral every 4 hours PRN Dyspepsia  diphenhydrAMINE   Injectable 25 milliGRAM(s) IV Push every 6 hours PRN nausea and vomiting  morphine  - Injectable 2 milliGRAM(s) IV Push every 4 hours PRN Moderate Pain (4 - 6)  ondansetron Injectable 4 milliGRAM(s) IV Push every 6 hours PRN Nausea      Vital Signs Last 24 Hrs  T(C): 36.8 (14 Oct 2020 16:02), Max: 36.9 (13 Oct 2020 21:26)  T(F): 98.3 (14 Oct 2020 16:02), Max: 98.4 (13 Oct 2020 21:26)  HR: 90 (14 Oct 2020 16:02) (81 - 97)  BP: 136/88 (14 Oct 2020 16:02) (136/88 - 149/77)  BP(mean): --  RR: 18 (14 Oct 2020 16:02) (18 - 18)  SpO2: 96% (14 Oct 2020 16:02) (94% - 98%)    PHYSICAL EXAM:    GENERAL: NAD,   HEAD:  Atraumatic, Normocephalic  EYES: EOMI, PERRLA, conjunctiva and sclera clear    NECK: Supple, No JVD, Normal thyroid  NERVOUS SYSTEM:    CHEST/LUNG: Clear to percussion bilaterally; No rales, rhonchi,   HEART: Regular rate and rhythm;   ABDOMEN: firm, no fluid wave, decreased BS/ mild tenderness. no rebound  EXTREMITIES:   edema:-  LYMPH: No lymphadenopathy noted        LABS:                        10.3   4.13  )-----------( 92       ( 14 Oct 2020 06:42 )             32.2     10-14    137  |  103  |  8   ----------------------------<  88  4.2   |  26  |  0.52    Ca    9.2      14 Oct 2020 06:42

## 2020-10-14 NOTE — PROGRESS NOTE ADULT - PROBLEM SELECTOR PLAN 1
Recurrent and platinum resistant; omental metastases; For Doxil x 1; next therapy in 4-4 weeks  Unfortunately prognosis is guarded since relapse was on the heels of completing platinum based therapy; response rates are typically in the 20 % range.

## 2020-10-15 NOTE — PROVIDER CONTACT NOTE (OTHER) - BACKGROUND
Pt was sent in by Dr. Avalos for inpatient chemo. Pt has a hx of ovarian CA, peritoneal carcinoma and omental mets. Pt has a hx of ascites.

## 2020-10-15 NOTE — PROGRESS NOTE ADULT - SUBJECTIVE AND OBJECTIVE BOX
65-year-old female with recurrent ovarian cancer, carboplatinum resistant disease, carcinomatosis  Transferred care from [facility]  Given salvage Doxil chemotherapy Yesterday October 14  Persistent nausea/Intermittent vomiting  Vomited earlier this morning and had some relief after this    s/pParacentesis of 2.4 L Ascites    Thrush    Chronically on Lovenox 60 daily at home    Abdomen soft mid epigastric fullness tenderness bowel sounds present    PE Fatigued Looking 65-year-old female  Skin pale moist  Abdomen soft mid epigastric palpable mass tender no rebound or guarding bowel sounds present  Bilateral lower extremity edema      CBC Full  -  ( 15 Oct 2020 05:30 )  WBC Count : 5.24 K/uL  RBC Count : 3.03 M/uL  Hemoglobin : 10.5 g/dL  Hematocrit : 31.4 %  Platelet Count - Automated : 127 K/uL  Mean Cell Volume : 103.6 fl  Mean Cell Hemoglobin : 34.7 pg  Mean Cell Hemoglobin Concentration : 33.4 gm/dL  Auto Neutrophil # : 4.25 K/uL  Auto Lymphocyte # : 0.67 K/uL  Auto Monocyte # : 0.28 K/uL  Auto Eosinophil # : 0.00 K/uL  Auto Basophil # : 0.01 K/uL  Auto Neutrophil % : 81.1 %  Auto Lymphocyte % : 12.8 %  Auto Monocyte % : 5.3 %  Auto Eosinophil % : 0.0 %  Auto Basophil % : 0.2 %      10-15    136  |  101  |  9   ----------------------------<  108<H>  4.6   |  27  |  0.61    Ca    9.2      15 Oct 2020 05:30  Phos  3.0     10-15  Mg     1.6     10-15                Vital Signs Last 24 Hrs  T(C): 37.2 (15 Oct 2020 16:03), Max: 37.2 (15 Oct 2020 16:03)  T(F): 99 (15 Oct 2020 16:03), Max: 99 (15 Oct 2020 16:03)  HR: 100 (15 Oct 2020 16:03) (84 - 100)  BP: 127/74 (15 Oct 2020 16:03) (127/74 - 147/74)  BP(mean): --  RR: 18 (15 Oct 2020 16:03) (18 - 18)  SpO2: 96% (15 Oct 2020 16:03) (96% - 99%)

## 2020-10-15 NOTE — PROVIDER CONTACT NOTE (OTHER) - ASSESSMENT
Pt has been experiencing abdominal discomfort, nausea and vomiting which has caused a decrease in her sleep.

## 2020-10-15 NOTE — PROGRESS NOTE ADULT - SUBJECTIVE AND OBJECTIVE BOX
65F w/PMH ovarian CA, omental mets, peritoneal ca, presents to ED to start new inpt chemo as sent in by her oncologist, Dr Fish.  She was recently at [facility] and had paracentesis w/ 2.4L drained.  She endorses diffuse abd pain that is constant for which she has fentanyl patch and takes tylenol.  Pain was 3/10 and currently 1/10 after taking tylenol.  She also endorses recent b/l LE swelling which she's never had before.  She is on lovenox 60mg Qdaily.  She denies any fever/chills, cp, sob, n/v/d, dysuria.  In ED, received 1L ivf.      SUBJECTIVE / OVERNIGHT EVENTS:  10/13: s/e at bedside, doing well, no new c/o or o/n events.  pain controlled.   10/14 - no cp palps sob or abdo pain; last BM yesterday - passing gas   10/15 - no cp palps sob; one episode of vomiting this morning; is passing gas     GENERAL: NAD, well-developed  HEAD:  Atraumatic, Normocephalic  EYES: EOMI, PERRLA, conjunctiva and sclera clear  ENT: normal hearing, no nasal discharge, throat clear, dentition normal, +THRUSH  NECK: Supple, No JVD, no LAD, no thyromegaly   CHEST/LUNG: Clear to auscultation bilaterally; No wheeze, respirations unlabored  HEART: Regular rate and rhythm; No murmurs, rubs, or gallops  ABDOMEN: Soft, +DIFFUSE TTP, MILDLY distended; Bowel sounds present,  EXTREMITIES:  2+ Peripheral Pulses, No clubbing, cyanosis, ++ LE nonpitting b/l edema  PSYCH: AAOx3, normal behavior  NEUROLOGY: non-focal, sensory and cn 2-12 intact, speech/language intact  SKIN: No visible rashes or lesions    LABS: All Labs Reviewed:                      10.5 5.24  )-----------( 127      ( 15 Oct 2020 05:30 )             31.4   136  |  101  |  9   ----------------------------<  108<H>  4.6   |  27  |  0.61      RADIOLOGY/EKG:  < from: US Duplex Venous Lower Ext Complete, Bilateral (10.13.20 @ 09:33) >  No evidence of deep venous thrombosis in either lower extremity.  < from: Xray Abdomen 2 Views (10.14.20 @ 14:16) >  No acute intra-abdominal findings. No bowel obstruction.  Suspect ascites    acetaminophen   Tablet .. 650 milliGRAM(s) Oral every 4 hours PRN  aluminum hydroxide/magnesium hydroxide/simethicone Suspension 30 milliLiter(s) Oral every 4 hours PRN  enoxaparin Injectable 60 milliGRAM(s) SubCutaneous daily  famotidine Injectable 20 milliGRAM(s) IV Push daily  fentaNYL   Patch  12 MICROgram(s)/Hr 1 Patch Transdermal every 72 hours  FIRST- Mouthwash  BLM 10 milliLiter(s) Swish and Spit two times a day  influenza   Vaccine 0.5 milliLiter(s) IntraMuscular once  morphine  - Injectable 2 milliGRAM(s) IV Push every 4 hours PRN  multivitamin/minerals 1 Tablet(s) Oral daily  ondansetron Injectable 4 milliGRAM(s) IV Push every 6 hours PRN  polyethylene glycol 3350 17 Gram(s) Oral daily             65F w/PMH ovarian CA, omental mets, peritoneal ca, presents to ED to start new inpt chemo as sent in by her oncologist, Dr Fish.  She was recently at [facility] and had paracentesis w/ 2.4L drained.  She endorses diffuse abd pain that is constant for which she has fentanyl patch and takes tylenol.  Pain was 3/10 and currently 1/10 after taking tylenol.  She also endorses recent b/l LE swelling which she's never had before.  She is on lovenox 60mg Qdaily.  She denies any fever/chills, cp, sob, n/v/d, dysuria.  In ED, received 1L ivf.      SUBJECTIVE / OVERNIGHT EVENTS:  10/13: s/e at bedside, doing well, no new c/o or o/n events.  pain controlled.   10/14 - no cp palps sob or abdo pain; last BM yesterday - passing gas   10/15 - no cp palps sob; one episode of vomiting this morning; had lunch     GENERAL: NAD, well-developed  HEAD:  Atraumatic, Normocephalic  EYES: EOMI, PERRLA, conjunctiva and sclera clear  ENT: normal hearing, no nasal discharge, throat clear, dentition normal, +THRUSH  NECK: Supple, No JVD, no LAD, no thyromegaly   CHEST/LUNG: Clear to auscultation bilaterally; No wheeze, respirations unlabored  HEART: Regular rate and rhythm; No murmurs, rubs, or gallops  ABDOMEN: Soft, +DIFFUSE TTP, MILDLY distended; Bowel sounds present,  EXTREMITIES:  2+ Peripheral Pulses, No clubbing, cyanosis, ++ LE nonpitting b/l edema  PSYCH: AAOx3, normal behavior  NEUROLOGY: non-focal, sensory and cn 2-12 intact, speech/language intact  SKIN: No visible rashes or lesions    LABS: All Labs Reviewed:                      10.5 5.24  )-----------( 127      ( 15 Oct 2020 05:30 )             31.4   136  |  101  |  9   ----------------------------<  108<H>  4.6   |  27  |  0.61      RADIOLOGY/EKG:  < from: US Duplex Venous Lower Ext Complete, Bilateral (10.13.20 @ 09:33) >  No evidence of deep venous thrombosis in either lower extremity.  < from: Xray Abdomen 2 Views (10.14.20 @ 14:16) >  No acute intra-abdominal findings. No bowel obstruction.  Suspect ascites    acetaminophen   Tablet .. 650 milliGRAM(s) Oral every 4 hours PRN  aluminum hydroxide/magnesium hydroxide/simethicone Suspension 30 milliLiter(s) Oral every 4 hours PRN  enoxaparin Injectable 60 milliGRAM(s) SubCutaneous daily  famotidine Injectable 20 milliGRAM(s) IV Push daily  fentaNYL   Patch  12 MICROgram(s)/Hr 1 Patch Transdermal every 72 hours  FIRST- Mouthwash  BLM 10 milliLiter(s) Swish and Spit two times a day  influenza   Vaccine 0.5 milliLiter(s) IntraMuscular once  morphine  - Injectable 2 milliGRAM(s) IV Push every 4 hours PRN  multivitamin/minerals 1 Tablet(s) Oral daily  ondansetron Injectable 4 milliGRAM(s) IV Push every 6 hours PRN  polyethylene glycol 3350 17 Gram(s) Oral daily

## 2020-10-15 NOTE — PROGRESS NOTE ADULT - ASSESSMENT
65F w/PMH ovarian CA, omental mets, peritoneal ca, presents to ED to start new inpt chemo as sent in by her oncologist, Dr iFsh.    recently, paracentesis w/ 2.4L drained.  ++ diffuse abd pain that is constant, + b/l LE swelling which she's never had before.    +thrush     Problem/Plan - 1:  ·  Problem: Ovarian  cancer  10/14 - CHEMO TODAY - DECADRON/DOXORUBICIN     Problem/Plan - 2:  ·  Problem: Ascites, malignant.    -monitor, may need paracentesis.      Problem/Plan - 3:  ·  Problem: Generalized abdominal pain.  Plan:   2/2 above  pain control w/ fentanyl patch, prn tylenol and IV morphine  if worsens, may need repeat para.   10/14: for nausea, zofrn  AXR - no obstruction  add pepcid IV   10/15 - for nausea/vomiting - use ZOFRAN/ATIVAN, cont IV PEPCID      Problem/Plan - 4:  ·  Problem: Thrush.  Plan:   will start on nystatin S&S  monitor for resolution  pt denies any odynophagia.      Problem/Plan - 5:  ·  Problem: Leg swelling.  Plan:   likely 2/2 ovarian ca  pt already on lovenox 60mg, so less likely dvt, but given malignancy, will check b/l LE doppler to eval for dvt- NO DVT   10/15 - continue home dose Lovenox 60 (normally should get 90 daily but per patient has a lot of bruising)      Problem/Plan - 6:  Problem: H/O PE Plan: on lovenox - c/w home dose.  PLATELETS LESS THAN 100 so will use LMWH 40  10/15: platelets improved RESUME HOME DOSE LMWH 60       discussed with GI and HEME ONC Dr Hernandez 65F w/PMH ovarian CA, omental mets, peritoneal ca, presents to ED to start new inpt chemo as sent in by her oncologist, Dr Fish.    recently, paracentesis w/ 2.4L drained.  ++ diffuse abd pain that is constant, + b/l LE swelling which she's never had before.    +thrush     Problem/Plan - 1:  ·  Problem: Ovarian  cancer  10/14 - CHEMO TODAY - DECADRON/DOXORUBICIN     Problem/Plan - 2:  ·  Problem: Ascites, malignant.    -monitor, may need paracentesis.      Problem/Plan - 3:  ·  Problem: Generalized abdominal pain.  Plan:   2/2 above  pain control w/ fentanyl patch, prn tylenol and IV morphine  if worsens, may need repeat para.   10/14: for nausea, zofrn  AXR - no obstruction  add pepcid IV   10/15 - for nausea/vomiting - use ZOFRAN/ATIVAN, cont IV PEPCID      Problem/Plan - 4:  ·  Problem: Thrush.  Plan:   will start on nystatin S&S  monitor for resolution  pt denies any odynophagia.      Problem/Plan - 5:  ·  Problem: Leg swelling.  Plan:   likely 2/2 ovarian ca  pt already on lovenox 60mg, so less likely dvt, but given malignancy, will check b/l LE doppler to eval for dvt- NO DVT   10/15 - continue home dose Lovenox 60 (normally should get 90 daily but per patient has a lot of bruising)      Problem/Plan - 6:  Problem: H/O PE Plan: on lovenox - c/w home dose.  PLATELETS LESS THAN 100 so will use LMWH 40  10/15: platelets improved RESUME HOME DOSE LMWH 60       discussed with GI and HEME ONC Dr Hernandez     **on recheck this evening -pt reports nausea, not passing much gas - will get CT ABDO   Code status also confirmed with patient - DNR only, chart updated and MOLST signed

## 2020-10-15 NOTE — PROGRESS NOTE ADULT - SUBJECTIVE AND OBJECTIVE BOX
Patient is a 65y old  Female who presents with a chief complaint of chemo (14 Oct 2020 19:06)      HPI:  she remains nauseated and vomited this am    MEDICATIONS  (STANDING):  enoxaparin Injectable 40 milliGRAM(s) SubCutaneous daily  famotidine Injectable 20 milliGRAM(s) IV Push daily  fentaNYL   Patch  12 MICROgram(s)/Hr 1 Patch Transdermal every 72 hours  FIRST- Mouthwash  BLM 10 milliLiter(s) Swish and Spit two times a day  influenza   Vaccine 0.5 milliLiter(s) IntraMuscular once  LORazepam   Injectable 1 milliGRAM(s) IV Push once  multivitamin/minerals 1 Tablet(s) Oral daily  pantoprazole    Tablet 40 milliGRAM(s) Oral before breakfast  polyethylene glycol 3350 17 Gram(s) Oral daily  sodium chloride 0.9% with potassium chloride 20 mEq/L 1000 milliLiter(s) (60 mL/Hr) IV Continuous <Continuous>    MEDICATIONS  (PRN):  acetaminophen   Tablet .. 650 milliGRAM(s) Oral every 4 hours PRN Mild Pain (1 - 3)  aluminum hydroxide/magnesium hydroxide/simethicone Suspension 30 milliLiter(s) Oral every 4 hours PRN Dyspepsia  diphenhydrAMINE   Injectable 25 milliGRAM(s) IV Push every 6 hours PRN nausea and vomiting  morphine  - Injectable 2 milliGRAM(s) IV Push every 4 hours PRN Moderate Pain (4 - 6)  ondansetron Injectable 4 milliGRAM(s) IV Push every 6 hours PRN Nausea      Allergies    codeine (Unknown)  mineral oil (Rash)  shellfish (Unknown)    Intolerances        REVIEW OF SYSTEMS:    CONSTITUTIONAL: No weakness, fevers or chills  RESPIRATORY: No cough, wheezing, hemoptysis; No shortness of breath  CARDIOVASCULAR: No chest pain or palpitations  GASTROINTESTINAL: No abdominal or epigastric pain. No nausea, vomiting, or hematemesis; No diarrhea or constipation. No melena or hematochezia.  All other review of systems is negative unless indicated above.    Vital Signs Last 24 Hrs  T(C): 36.8 (14 Oct 2020 21:27), Max: 36.8 (14 Oct 2020 09:19)  T(F): 98.2 (14 Oct 2020 21:27), Max: 98.3 (14 Oct 2020 14:33)  HR: 84 (14 Oct 2020 21:27) (81 - 90)  BP: 147/74 (14 Oct 2020 21:27) (136/88 - 149/77)  BP(mean): --  RR: 18 (14 Oct 2020 21:27) (18 - 18)  SpO2: 98% (14 Oct 2020 21:27) (94% - 98%)    PHYSICAL EXAM:    Constitutional: NAD, appears chronically ill  Gastrointestinal: BS+, soft, tender      LABS:                        10.5   5.24  )-----------( 127      ( 15 Oct 2020 05:30 )             31.4     10-15    136  |  101  |  9   ----------------------------<  108<H>  4.6   |  27  |  0.61    Ca    9.2      15 Oct 2020 05:30  Phos  3.0     10-15  Mg     1.6     10-15            RADIOLOGY & ADDITIONAL STUDIES:

## 2020-10-15 NOTE — PROGRESS NOTE ADULT - ASSESSMENT
64yo female with omental carcinomatosis on chemo with severe nausea/vomiting  try ativan IV empirically  consider repeat ct scan though prior with no evidence of obstruction she is at risk

## 2020-10-15 NOTE — PROGRESS NOTE ADULT - ASSESSMENT
Plan Abdominal discomfort probably related to cancer  Despite paracentesis still with nausea  We will request GI evaluation to help with management as she is seen them in the past  Patient states she is taking sublingual Zofran and this has not been effective  Unclear what dose she had    Additional options include high-dose Zofran, Ativan, possible addition of Reglan ; Reglan  as per GI    Lower extremity edema, duplex Dopplers Negative  Multifactorial   Abdominal pelvic disease, hypoalbuminemia    Continue prophylactic Lovenox nutrition consult    Nystatin for thrush

## 2020-10-16 NOTE — DISCHARGE NOTE NURSING/CASE MANAGEMENT/SOCIAL WORK - NSDCCRNAME_GEN_ALL_CORE_FT
Yellow discharge planning folder provided with community resources, home care agency list, cancer resources [facility]

## 2020-10-16 NOTE — PHYSICAL THERAPY INITIAL EVALUATION ADULT - GAIT DISTANCE, PT EVAL
50 feet/distance ltd by fatigue (as pt was on feet ~10min washing out undergarment in bathrm just prior)

## 2020-10-16 NOTE — PROGRESS NOTE ADULT - ASSESSMENT
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  symptoms are multifactorial.  some relief with pepcid.   no significant relief with zofran, continue ativan and add reglan OTC.   adjust meds to timing of am nasuea.  would increase pepcid to BID, likely some gerd component to nausea?   consider repeat ct scan though prior with no evidence of obstruction she is at risk if worsening symptoms.   discussed at length with Dr. Kaur, Dr. Powell, pt. 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  symptoms are multifactorial.  some relief with pepcid.   no significant relief with zofran, continue ativan and add reglan OTC.   adjust meds to timing of am nasuea.  would increase pepcid to BID, likely some gerd component to nausea?   ct scan reviewed with Dr. Powell.   discussed at length with Dr. Kaur, Dr. Powell, pt.

## 2020-10-16 NOTE — DISCHARGE NOTE NURSING/CASE MANAGEMENT/SOCIAL WORK - PATIENT PORTAL LINK FT
You can access the FollowMyHealth Patient Portal offered by [facility] by registering at the following website: [email or web address]. By joining "[employer]’s FollowMyHealth portal, you will also be able to view your health information using other applications (apps) compatible with our system.

## 2020-10-16 NOTE — PROGRESS NOTE ADULT - SUBJECTIVE AND OBJECTIVE BOX
Patient is a 65y old  Female who presents with a chief complaint of chemo (15 Oct 2020 19:49)    HPI:  HPI:  65F w/PMH ovarian CA, omental mets, peritoneal ca, presents to ED to start new inpt chemo as sent in by her oncologist, Dr Fish.  She was recently at [facility] and had paracentesis w/ 2.4L drained.  She endorses diffuse abd pain that is constant for which she has fentanyl patch and takes tylenol.  Pain was 3/10 and currently 1/10 after taking tylenol.  She also endorses recent b/l LE swelling which she's never had before.  She is on lovenox 60mg Qdaily.  She denies any fever/chills, cp, sob, n/v/d, dysuria.      In ED, received 1L ivf.      10/16/2020-  Pt nausea in AM and about 3-4 hours post meal.  + acid, but pepcid does help.   Abdominal discomfort due to malignancy.     PAST MEDICAL & SURGICAL HISTORY:  Ovarian cancer    Constipation    Peritoneal carcinoma    Omental metastasis    No significant past surgical history      MEDICATIONS  (STANDING):  enoxaparin Injectable 60 milliGRAM(s) SubCutaneous daily  famotidine Injectable 20 milliGRAM(s) IV Push every 12 hours  famotidine Injectable 20 milliGRAM(s) IV Push daily  fentaNYL   Patch  12 MICROgram(s)/Hr 1 Patch Transdermal every 72 hours  FIRST- Mouthwash  BLM 10 milliLiter(s) Swish and Spit two times a day  influenza   Vaccine 0.5 milliLiter(s) IntraMuscular once  multivitamin/minerals 1 Tablet(s) Oral daily  polyethylene glycol 3350 17 Gram(s) Oral daily    MEDICATIONS  (PRN):  acetaminophen   Tablet .. 650 milliGRAM(s) Oral every 4 hours PRN Mild Pain (1 - 3)  aluminum hydroxide/magnesium hydroxide/simethicone Suspension 30 milliLiter(s) Oral every 4 hours PRN Dyspepsia  morphine  - Injectable 2 milliGRAM(s) IV Push every 4 hours PRN Moderate Pain (4 - 6)  ondansetron Injectable 4 milliGRAM(s) IV Push every 6 hours PRN Nausea    Allergies    codeine (Unknown)  mineral oil (Rash)  shellfish (Unknown)    SOCIAL HISTORY:  FAMILY HISTORY:  FH: heart attack      REVIEW OF SYSTEMS:  CONSTITUTIONAL: No weakness, fevers or chills  RESPIRATORY: No cough, wheezing, hemoptysis; No shortness of breath  CARDIOVASCULAR: No chest pain or palpitations  GASTROINTESTINAL: Per HPI.     Vital Signs Last 24 Hrs  T(C): 37 (15 Oct 2020 22:13), Max: 37.2 (15 Oct 2020 16:03)  T(F): 98.6 (15 Oct 2020 22:13), Max: 99 (15 Oct 2020 16:03)  HR: 81 (15 Oct 2020 22:13) (81 - 100)  BP: 132/73 (15 Oct 2020 22:13) (127/74 - 139/79)  BP(mean): --  RR: 18 (15 Oct 2020 22:13) (18 - 18)  SpO2: 97% (15 Oct 2020 22:13) (96% - 99%)    PHYSICAL EXAM:  Constitutional: Middle aged female, appears weak in nad.   Respiratory: CTAB  Cardiovascular: S1 and S2, RRR, no M/G/R  Gastrointestinal: Distended, not tense, +BS    LABS:                        10.4   6.10  )-----------( 125      ( 16 Oct 2020 06:30 )             31.6     10-16    135  |  99  |  12  ----------------------------<  89  4.2   |  30  |  0.65    Ca    9.2      16 Oct 2020 06:30  Phos  3.0     10-15  Mg     2.0     10-16            RADIOLOGY & ADDITIONAL STUDIES:

## 2020-10-16 NOTE — PROGRESS NOTE ADULT - ASSESSMENT
65F w/PMH ovarian CA, omental mets, peritoneal ca, presents to ED to start new inpt chemo as sent in by her oncologist, Dr Fish.    recently, paracentesis w/ 2.4L drained.  ++ diffuse abd pain that is constant, + b/l LE swelling which she's never had before.    +thrush     Problem/Plan - 1:  ·  Problem: Ovarian  cancer  10/14 - CHEMO TODAY - DECADRON/DOXORUBICIN     Problem/Plan - 2:  ·  Problem: Ascites, malignant.    -monitor, may need paracentesis.   10/16 - stable ascites on imaging, no plans for para at this time     Problem/Plan - 3:  ·  Problem: Generalized abdominal pain.  Plan:   2/2 above  pain control w/ fentanyl patch, prn tylenol and IV morphine  if worsens, may need repeat para.   10/14: for nausea, zofrn  AXR - no obstruction  add pepcid IV   10/15 - for nausea/vomiting - use ZOFRAN/ATIVAN, cont IV PEPCID   10/16 - increase pepcid bid, cont zofran, ativan, add reglan atc   no plans for EGD at this time      Problem/Plan - 4:  ·  Problem: Thrush.  Plan:   will start on nystatin S&S  monitor for resolution  pt denies any odynophagia.      Problem/Plan - 5:  ·  Problem: Leg swelling.  Plan:   likely 2/2 ovarian ca  pt already on lovenox 60mg, so less likely dvt, but given malignancy, will check b/l LE doppler to eval for dvt- NO DVT   10/15 - continue home dose Lovenox 60 (normally should get 90 daily but per patient has a lot of bruising)      Problem/Plan - 6:  Problem: H/O PE Plan: on lovenox - c/w home dose.  PLATELETS LESS THAN 100 so will use LMWH 40  10/15: platelets improved RESUME HOME DOSE LMWH 60       discussed with GI and HEME ONC Dr Fish   Code status also confirmed with patient - DNR only, chart updated and MOLST signed     DISPO 10/16 - pt smokes MJ at home which generally helps her  Hopefully does well with current regimen for nausea and can dc home where she can cont MJ   which will aslo help with her appetite  hope to avoid PPN etc   PT EVAL - home versus rehab

## 2020-10-16 NOTE — PROGRESS NOTE ADULT - ASSESSMENT
Locally advanced ovarian cancer   cisplatin resistant  Omental metastases  Secondary thrombophilia  Anemia  Nausea    Plan  Next chemotherapy due in about 3 weeks.  Assuming she has a response to current therapy might add bevacizumab  Currently on multidrug therapy for nausea.  Agree with GI need for MRI brain with gadolinium should her nausea persist to ensure no central cause for the anemia  Continue Lovenox  Encourage ambulation

## 2020-10-16 NOTE — PHYSICAL THERAPY INITIAL EVALUATION ADULT - PERTINENT HX OF CURRENT PROBLEM, REHAB EVAL
ovarian CA, omental mets, peritoneal ca, presents to ED to start new inpt chemo as sent in by her oncologist, Dr Fish.  She was recently at [facility] and had paracentesis w/ 2.4L drained.

## 2020-10-16 NOTE — PROGRESS NOTE ADULT - SUBJECTIVE AND OBJECTIVE BOX
65F w/PMH ovarian CA, omental mets, peritoneal ca, presents to ED to start new inpt chemo as sent in by her oncologist, Dr Fish.  She was recently at [facility] and had paracentesis w/ 2.4L drained.  She endorses diffuse abd pain that is constant for which she has fentanyl patch and takes tylenol.  Pain was 3/10 and currently 1/10 after taking tylenol.  She also endorses recent b/l LE swelling which she's never had before.  She is on lovenox 60mg Qdaily.  She denies any fever/chills, cp, sob, n/v/d, dysuria.  In ED, received 1L ivf.      SUBJECTIVE / OVERNIGHT EVENTS:  10/13: s/e at bedside, doing well, no new c/o or o/n events.  pain controlled.   10/14 - no cp palps sob or abdo pain; last BM yesterday - passing gas   10/15 - no cp palps sob; one episode of vomiting this morning; had lunch   10/16 - persistent nausea, vomited again this morning     GENERAL: NAD, well-developed  HEAD:  Atraumatic, Normocephalic  EYES: EOMI, PERRLA, conjunctiva and sclera clear  ENT: normal hearing, no nasal discharge, throat clear, dentition normal, +THRUSH  NECK: Supple, No JVD, no LAD, no thyromegaly   CHEST/LUNG: Clear to auscultation bilaterally; No wheeze, respirations unlabored  HEART: Regular rate and rhythm; No murmurs, rubs, or gallops  ABDOMEN: Soft, +DIFFUSE TTP, MILDLY distended; Bowel sounds present,  EXTREMITIES:  2+ Peripheral Pulses, No clubbing, cyanosis, ++ LE nonpitting b/l edema  NEUROLOGY: non-focal, sensory and cn 2-12 intact, speech/language intact    RADIOLOGY/EKG:  < from: US Duplex Venous Lower Ext Complete, Bilateral (10.13.20 @ 09:33) >  No evidence of deep venous thrombosis in either lower extremity.  < from: Xray Abdomen 2 Views (10.14.20 @ 14:16) >  No acute intra-abdominal findings. No bowel obstruction.  Suspect ascites    < from: CT Abdomen and Pelvis w/ Oral Cont and w/ IV Cont (10.15.20 @ 20:34) >  No bowel obstruction.  Moderate ascites, minimally increased.      LABS: All Labs Reviewed:                      10.4   6.10  )-----------( 125      ( 16 Oct 2020 06:30 )             31.6     135  |  99  |  12  ----------------------------<  89  4.2   |  30  |  0.65        acetaminophen   Tablet .. 650 milliGRAM(s) Oral every 4 hours PRN  aluminum hydroxide/magnesium hydroxide/simethicone Suspension 30 milliLiter(s) Oral every 4 hours PRN  enoxaparin Injectable 60 milliGRAM(s) SubCutaneous daily  famotidine Injectable 20 milliGRAM(s) IV Push every 12 hours  fentaNYL   Patch  12 MICROgram(s)/Hr 1 Patch Transdermal every 72 hours  FIRST- Mouthwash  BLM 10 milliLiter(s) Swish and Spit two times a day  influenza   Vaccine 0.5 milliLiter(s) IntraMuscular once  LORazepam   Injectable 1 milliGRAM(s) IV Push three times a day  metoclopramide Injectable 10 milliGRAM(s) IV Push three times a day  morphine  - Injectable 2 milliGRAM(s) IV Push every 4 hours PRN  multivitamin/minerals 1 Tablet(s) Oral daily  ondansetron Injectable 4 milliGRAM(s) IV Push every 6 hours PRN  polyethylene glycol 3350 17 Gram(s) Oral two times a day

## 2020-10-16 NOTE — PROGRESS NOTE ADULT - SUBJECTIVE AND OBJECTIVE BOX
INTERVAL HISTORY:    Patient has a history of advanced ovarian cancer, cisplatin resistant, admitted for abdominal discomfort, nausea.  She was started on Doxil which she tolerated very well.  Unfortunately continues to have nausea.  Seen by GI.  Ativan and Reglan added to her regimen.  If the latter is not effective they recommended MRI of the brain which is perfectly reasonable    She was unable to keep anything down yesterday.  Did have some breakfast this morning and so far has not had any emesis    She informs me that the nausea is not new and was relieved while at home on marijuana.    Allergies    codeine (Unknown)  mineral oil (Rash)  shellfish (Unknown)    Intolerances        MEDICATIONS  (STANDING):  enoxaparin Injectable 60 milliGRAM(s) SubCutaneous daily  famotidine Injectable 20 milliGRAM(s) IV Push every 12 hours  fentaNYL   Patch  12 MICROgram(s)/Hr 1 Patch Transdermal every 72 hours  FIRST- Mouthwash  BLM 10 milliLiter(s) Swish and Spit two times a day  influenza   Vaccine 0.5 milliLiter(s) IntraMuscular once  LORazepam   Injectable 1 milliGRAM(s) IV Push three times a day  metoclopramide Injectable 10 milliGRAM(s) IV Push three times a day  multivitamin/minerals 1 Tablet(s) Oral daily  polyethylene glycol 3350 17 Gram(s) Oral two times a day    MEDICATIONS  (PRN):  acetaminophen   Tablet .. 650 milliGRAM(s) Oral every 4 hours PRN Mild Pain (1 - 3)  aluminum hydroxide/magnesium hydroxide/simethicone Suspension 30 milliLiter(s) Oral every 4 hours PRN Dyspepsia  morphine  - Injectable 2 milliGRAM(s) IV Push every 4 hours PRN Moderate Pain (4 - 6)  ondansetron Injectable 4 milliGRAM(s) IV Push every 6 hours PRN Nausea      Vital Signs Last 24 Hrs  T(C): 36.8 (16 Oct 2020 17:13), Max: 37 (15 Oct 2020 22:13)  T(F): 98.2 (16 Oct 2020 17:13), Max: 98.6 (15 Oct 2020 22:13)  HR: 88 (16 Oct 2020 17:13) (81 - 88)  BP: 126/69 (16 Oct 2020 17:13) (126/69 - 132/81)  BP(mean): --  RR: 18 (16 Oct 2020 17:13) (18 - 18)  SpO2: 97% (16 Oct 2020 17:13) (96% - 97%)    PHYSICAL EXAM:    GENERAL: NAD,   HEAD:  Atraumatic, Normocephalic  EYES: EOMI, PERRLA, conjunctiva and sclera clear    NECK: Supple, No JVD, Normal thyroid  NERVOUS SYSTEM:    CHEST/LUNG: Clear to percussion bilaterally; No rales, rhonchi,   HEART: Regular rate and rhythm;   ABDOMEN: Soft, Nontender.  EXTREMITIES:   edema:-  LYMPH: No lymphadenopathy noted      LABS:                        10.4   6.10  )-----------( 125      ( 16 Oct 2020 06:30 )             31.6     10-16    135  |  99  |  12  ----------------------------<  89  4.2   |  30  |  0.65    Ca    9.2      16 Oct 2020 06:30  Phos  3.0     10-15  Mg     2.0     10-16

## 2020-10-17 NOTE — PROGRESS NOTE ADULT - ASSESSMENT
66yo female with ovarian cancer with n/v    ativan, reglan seem to be helping  if continues improvement, ?possible d/c tomorrow on oral ativan and metoclopramide

## 2020-10-17 NOTE — PROGRESS NOTE ADULT - ASSESSMENT
65F w/PMH ovarian CA, omental mets, peritoneal ca, presents to ED to start new inpt chemo as sent in by her oncologist, Dr Fish.    recently, paracentesis w/ 2.4L drained.  ++ diffuse abd pain that is constant, + b/l LE swelling which she's never had before.    +thrush     Ovarian  cancer  10/14 - CHEMO TODAY - DECADRON/DOXORUBICIN    Ascites, malignant.    -monitor, may need paracentesis.   10/16 - stable ascites on imaging, no plans for para at this time    Generalized abdominal pain.  Plan:   2/2 above  pain control w/ fentanyl patch, prn tylenol and IV morphine  if worsens, may need repeat para.   10/14: for nausea, zofrn  AXR - no obstruction  add pepcid IV   10/15 - for nausea/vomiting - use ZOFRAN/ATIVAN, cont IV PEPCID   10/16 - increase pepcid bid, cont zofran, ativan, add reglan atc   no plans for EGD at this time     Thrush.  Plan:   will start on nystatin S&S  monitor for resolution  pt denies any odynophagia.      Leg swelling.  Plan:   likely 2/2 ovarian ca  pt already on lovenox 60mg, so less likely dvt, but given malignancy, will check b/l LE doppler to eval for dvt- NO DVT   10/15 - continue home dose Lovenox 60 (normally should get 90 daily but per patient has a lot of bruising)      H/O PE Plan: on lovenox - c/w home dose.  PLATELETS LESS THAN 100 so will use LMWH 40  10/15: platelets improved RESUME HOME DOSE LMWH 60     10/17 - Hypoxic Resp Failure  got CT chest r/o PE   due to contrast use placed on IVfs  CT negative for PE but shows dilated fluid-filled esophagus and consolidations suggestive of Aspiration PNA  called and discussed findings with pt - will start GN and anerobic coverage  Pt should be sitting upright  will discuss with GI Dr Altamirano in the morning  Pt denies having any previous issues iwht the esphagus - no diln/stents etc       discussed with GI and HEME ONC Dr Fish   Code status also confirmed with patient - DNR only, chart updated and MOLST signed     DISPO 10/16 - pt smokes MJ at home which generally helps her  Hopefully does well with current regimen for nausea and can dc home where she can cont MJ   which will aslo help with her appetite  hope to avoid PPN etc   PT EVAL - home versus rehab   DISPO 10/17 - Hypoxic resp failure, ASP PNA, start ABX, noted dilated ESOPHAGUS - f/u with GI

## 2020-10-17 NOTE — PROGRESS NOTE ADULT - SUBJECTIVE AND OBJECTIVE BOX
Left mess for pt about mess below   Patient is a 65y old  Female who presents with a chief complaint of chemo (16 Oct 2020 19:27)      HPI:  pt feeling a little better with less nausea    PAST MEDICAL & SURGICAL HISTORY:  Ovarian cancer    Constipation    Peritoneal carcinoma    Omental metastasis    No significant past surgical history        MEDICATIONS  (STANDING):  enoxaparin Injectable 60 milliGRAM(s) SubCutaneous daily  famotidine Injectable 20 milliGRAM(s) IV Push every 12 hours  fentaNYL   Patch  12 MICROgram(s)/Hr 1 Patch Transdermal every 72 hours  FIRST- Mouthwash  BLM 10 milliLiter(s) Swish and Spit two times a day  influenza   Vaccine 0.5 milliLiter(s) IntraMuscular once  LORazepam   Injectable 1 milliGRAM(s) IV Push three times a day  metoclopramide Injectable 10 milliGRAM(s) IV Push three times a day  multivitamin/minerals 1 Tablet(s) Oral daily  polyethylene glycol 3350 17 Gram(s) Oral two times a day    MEDICATIONS  (PRN):  acetaminophen   Tablet .. 650 milliGRAM(s) Oral every 4 hours PRN Mild Pain (1 - 3)  aluminum hydroxide/magnesium hydroxide/simethicone Suspension 30 milliLiter(s) Oral every 4 hours PRN Dyspepsia  morphine  - Injectable 2 milliGRAM(s) IV Push every 4 hours PRN Moderate Pain (4 - 6)  ondansetron Injectable 4 milliGRAM(s) IV Push every 6 hours PRN Nausea      Allergies    codeine (Unknown)  mineral oil (Rash)  shellfish (Unknown)    Intolerances        REVIEW OF SYSTEMS:    CONSTITUTIONAL: weakness  RESPIRATORY: No cough, wheezing, hemoptysis; No shortness of breath  CARDIOVASCULAR: No chest pain or palpitations  GASTROINTESTINAL: as above  All other review of systems is negative unless indicated above.    Vital Signs Last 24 Hrs  T(C): 36.6 (17 Oct 2020 08:40), Max: 36.8 (16 Oct 2020 17:13)  T(F): 97.9 (17 Oct 2020 08:40), Max: 98.2 (16 Oct 2020 17:13)  HR: 95 (17 Oct 2020 08:45) (88 - 103)  BP: 125/74 (17 Oct 2020 08:40) (122/76 - 126/69)  BP(mean): --  RR: 18 (17 Oct 2020 08:56) (18 - 18)  SpO2: 93% (17 Oct 2020 08:56) (88% - 97%)    PHYSICAL EXAM:  Constitutional: appears chronically ill  Gastrointestinal: BS+, soft, NT/ND      LABS:                        10.4   6.10  )-----------( 125      ( 16 Oct 2020 06:30 )             31.6     10-17    136  |  101  |  14  ----------------------------<  86  4.2   |  29  |  0.75    Ca    8.8      17 Oct 2020 06:53  Phos  3.1     10-17  Mg     1.9     10-17            RADIOLOGY & ADDITIONAL STUDIES:

## 2020-10-17 NOTE — PROGRESS NOTE ADULT - SUBJECTIVE AND OBJECTIVE BOX
65F w/PMH ovarian CA, omental mets, peritoneal ca, presents to ED to start new inpt chemo as sent in by her oncologist, Dr Fish.  She was recently at [facility] and had paracentesis w/ 2.4L drained.  She endorses diffuse abd pain that is constant for which she has fentanyl patch and takes tylenol.  Pain was 3/10 and currently 1/10 after taking tylenol.  She also endorses recent b/l LE swelling which she's never had before.  She is on lovenox 60mg Qdaily.  She denies any fever/chills, cp, sob, n/v/d, dysuria.  In ED, received 1L ivf.      SUBJECTIVE / OVERNIGHT EVENTS:  10/13: s/e at bedside, doing well, no new c/o or o/n events.  pain controlled.   10/14 - no cp palps sob or abdo pain; last BM yesterday - passing gas   10/15 - no cp palps sob; one episode of vomiting this morning; had lunch   10/16 - persistent nausea, vomited again this morning   10/17 - no cp palps sob; some cough, hypoxia, got CT chest     GENERAL: NAD, well-developed  HEAD:  Atraumatic, Normocephalic  EYES: EOMI, PERRLA, conjunctiva and sclera clear  ENT: normal hearing, no nasal discharge, throat clear, dentition normal, +THRUSH  NECK: Supple, No JVD, no LAD, no thyromegaly   CHEST/LUNG: Clear to auscultation bilaterally; No wheeze, respirations unlabored  HEART: Regular rate and rhythm; No murmurs, rubs, or gallops  ABDOMEN: Soft, +DIFFUSE TTP, MILDLY distended; Bowel sounds present,  EXTREMITIES:  2+ Peripheral Pulses, No clubbing, cyanosis, ++ LE nonpitting b/l edema  NEUROLOGY: non-focal, sensory and cn 2-12 intact, speech/language intact    RADIOLOGY/EKG:  < from: US Duplex Venous Lower Ext Complete, Bilateral (10.13.20 @ 09:33) >  No evidence of deep venous thrombosis in either lower extremity.  < from: Xray Abdomen 2 Views (10.14.20 @ 14:16) >  No acute intra-abdominal findings. No bowel obstruction.  Suspect ascites    < from: CT Abdomen and Pelvis w/ Oral Cont and w/ IV Cont (10.15.20 @ 20:34) >  No bowel obstruction.  Moderate ascites, minimally increased.    LABS: All Labs Reviewed:                     10.1   5.51  )-----------( 112      ( 17 Oct 2020 06:53 )             30.5   136  |  101  |  14  ----------------------------<  86  4.2   |  29  |  0.75    RADIOLOGY/EKG:  < from: CT Angio Chest PE Protocol w/ IV Cont (10.17.20 @ 14:37) >  No pulmonary embolus.    Patchy peribronchial vascular consolidations ofthe lung bases. The concurrent finding of a dilated and fluid-filled esophagus suggests that aspiration may be a likely etiology for this finding.    < end of copied text >  < from: CT Angio Chest PE Protocol w/ IV Cont (10.17.20 @ 14:37) >  No pulmonary embolus.  Patchy peribronchial vascular consolidations ofthe lung bases. The concurrent finding of a dilated and fluid-filled esophagus suggests that aspiration may be a likely etiology for this finding.          acetaminophen   Tablet .. 650 milliGRAM(s) Oral every 4 hours PRN  aluminum hydroxide/magnesium hydroxide/simethicone Suspension 30 milliLiter(s) Oral every 4 hours PRN  enoxaparin Injectable 60 milliGRAM(s) SubCutaneous daily  famotidine Injectable 20 milliGRAM(s) IV Push every 12 hours  fentaNYL   Patch  12 MICROgram(s)/Hr 1 Patch Transdermal every 72 hours  FIRST- Mouthwash  BLM 10 milliLiter(s) Swish and Spit two times a day  influenza   Vaccine 0.5 milliLiter(s) IntraMuscular once  LORazepam   Injectable 1 milliGRAM(s) IV Push three times a day  metoclopramide Injectable 10 milliGRAM(s) IV Push three times a day  morphine  - Injectable 2 milliGRAM(s) IV Push every 4 hours PRN  multivitamin/minerals 1 Tablet(s) Oral daily  ondansetron Injectable 4 milliGRAM(s) IV Push every 6 hours PRN  polyethylene glycol 3350 17 Gram(s) Oral two times a day  sodium chloride 0.45%. 1000 milliLiter(s) IV Continuous <Continuous>

## 2020-10-18 NOTE — PROGRESS NOTE ADULT - SUBJECTIVE AND OBJECTIVE BOX
INTERVAL HISTORY:   66 yo with recurrent ovarian cancer ; s/p Doxil 50 mg/ m2 last week; has not been able to go home as a result of nausea, poor po tolerance; seen by  GI and on antacids/ motility agents; Recent CT lower esophogeal dilation/ pooling; possible a source of suspected aspiration on antibiotics  Discussed above with Dr Pena; patient now scheduled for a Barium swallow.     Allergies    codeine (Unknown)  mineral oil (Rash)  shellfish (Unknown)    Intolerances        MEDICATIONS  (STANDING):  enoxaparin Injectable 90 milliGRAM(s) SubCutaneous daily  famotidine Injectable 20 milliGRAM(s) IV Push every 12 hours  fentaNYL   Patch  12 MICROgram(s)/Hr 1 Patch Transdermal every 72 hours  FIRST- Mouthwash  BLM 10 milliLiter(s) Swish and Spit two times a day  influenza   Vaccine 0.5 milliLiter(s) IntraMuscular once  LORazepam   Injectable 1 milliGRAM(s) IV Push three times a day  metoclopramide Injectable 10 milliGRAM(s) IV Push three times a day  multivitamin/minerals 1 Tablet(s) Oral daily  piperacillin/tazobactam IVPB.. 3.375 Gram(s) IV Intermittent every 8 hours  polyethylene glycol 3350 17 Gram(s) Oral daily  polyethylene glycol 3350 17 Gram(s) Oral two times a day  sodium chloride 0.45%. 1000 milliLiter(s) (60 mL/Hr) IV Continuous <Continuous>    MEDICATIONS  (PRN):  acetaminophen   Tablet .. 650 milliGRAM(s) Oral every 4 hours PRN Mild Pain (1 - 3)  aluminum hydroxide/magnesium hydroxide/simethicone Suspension 30 milliLiter(s) Oral every 4 hours PRN Dyspepsia  melatonin 5 milliGRAM(s) Oral at bedtime PRN Insomnia  morphine  - Injectable 2 milliGRAM(s) IV Push every 4 hours PRN Moderate Pain (4 - 6)  ondansetron Injectable 4 milliGRAM(s) IV Push every 6 hours PRN Nausea  simethicone 80 milliGRAM(s) Chew three times a day PRN Indigestion      Vital Signs Last 24 Hrs  T(C): 36.7 (18 Oct 2020 09:16), Max: 36.9 (17 Oct 2020 15:59)  T(F): 98 (18 Oct 2020 09:16), Max: 98.4 (17 Oct 2020 15:59)  HR: 86 (18 Oct 2020 09:16) (86 - 111)  BP: 128/79 (18 Oct 2020 09:16) (123/72 - 128/79)  BP(mean): --  RR: 18 (18 Oct 2020 09:16) (18 - 18)  SpO2: 97% (18 Oct 2020 09:16) (95% - 97%)    PHYSICAL EXAM:    GENERAL: NAD,   HEAD:  Atraumatic, Normocephalic  EYES: EOMI, PERRLA, conjunctiva and sclera clear    NECK: Supple, No JVD, Normal thyroid  NERVOUS SYSTEM:    CHEST/LUNG: Clear to percussion bilaterally; No rales, rhonchi,   HEART: Regular rate and rhythm;   ABDOMEN: Soft, Nontender.  EXTREMITIES:   edema:-  LYMPH: No lymphadenopathy noted        LABS:                        9.5    6.13  )-----------( x        ( 18 Oct 2020 08:12 )             29.4     10-18    135  |  99  |  14  ----------------------------<  91  3.9   |  29  |  0.72    Ca    8.8      18 Oct 2020 08:12  Phos  3.2     10-18  Mg     2.1     10-18      RADIOLOGY & ADDITIONAL STUDIES:    < from: CT Angio Chest PE Protocol w/ IV Cont (10.17.20 @ 14:37) >  EXAM:  CTA CHEST PE PROTOCOL (W)AW IC                            PROCEDURE DATE:  10/17/2020          INTERPRETATION:  Reason for Exam: Shortness of breath. chest pain.    CTA of the chest was performed from the thoracic inlet to the level of the adrenal glands following IV contrast injection of  80 cc of Omnipaque 350. No immediate complications were reported.  MIP images were also created and reviewed.    Comparison: Abdominal CT dated September 17, 2020    Tubes/Lines: None    Mediastinum and Heart: Aorta and pulmonary arteries are normal in size. Thyroid gland is unremarkable. No lymphadenopathy. No pericardial effusion. The esophagus is dilated.    Lungs, Pleura, and Airways: There is no pulmonary embolus. Patchy bibasilar peribronchial vascular consolidations are new since previous exam    Visualized Abdomen: Loculated ascites is unchanged since prior    Bones and soft tissues: Unremarkable.    IMPRESSION:    No pulmonary embolus.    Patchy peribronchial vascular consolidations ofthe lung bases. The concurrent finding of a dilated and fluid-filled esophagus suggests that aspiration may be a likely etiology for this finding.                ALICIA WAN M.D., ATTENDING RADIOLOGIST  This document has been electron    < end of copied text >

## 2020-10-18 NOTE — PROGRESS NOTE ADULT - ASSESSMENT
65F w/PMH ovarian CA, omental mets, peritoneal ca, presents to ED to start new inpt chemo as sent in by her oncologist, Dr Fish.    recently, paracentesis w/ 2.4L drained.  ++ diffuse abd pain that is constant, + b/l LE swelling which she's never had before.    +thrush     Ovarian  cancer  10/14 - CHEMO TODAY - DECADRON/DOXORUBICIN    Ascites, malignant.    -monitor, may need paracentesis.   10/16 - stable ascites on imaging, no plans for para at this time    Generalized abdominal pain  AXR - no obstruction   10/15 - for nausea/vomiting - use ZOFRAN/ATIVAN, cont IV PEPCID   10/16 - increase pepcid bid, cont zofran, ativan, add reglan atc     Thrush.  Plan:   will start on nystatin S&S     H/O PE  on lovenox should be on 90 (home dose of 60 daily is undertreating her)       10/17 - Hypoxic Resp Failure 2/2 ASP PNA   got CT chest r/o PE as pt was undertreated with home dose of lovenox 60   CT negative for PE but shows dilated fluid-filled esophagus and consolidations suggestive of Aspiration PNA  called and discussed findings with pt - will start GN and anerobic coverage  Pt should be sitting upright    10/18 - Fluid-filled esophagus/freq regurgitation/aspiration  discussed with GI - will get barium esophagram  will also get USG ABDO to assess for fluid to tap   Pls call Dr Chidi Garcia's office in  - [facility] - [phone]       discussed with GI and HEME ONC Dr Fish   Code status - DNR only

## 2020-10-18 NOTE — PROGRESS NOTE ADULT - ASSESSMENT
Recurrent ovarian cancer / S/P doxil last week  Anemia / chronic  Thrombophilia/ secondary  Dilated esophageus; unclear etiology; Barium ordered  Malnutrition: Consider PPN etc/ unless esophogeal motility issue can be resolved  ? role for PEG etc

## 2020-10-18 NOTE — PROGRESS NOTE ADULT - SUBJECTIVE AND OBJECTIVE BOX
65F w/PMH ovarian CA, omental mets, peritoneal ca, presents to ED to start new inpt chemo as sent in by her oncologist, Dr Fish.  She was recently at [facility] and had paracentesis w/ 2.4L drained.  She endorses diffuse abd pain that is constant for which she has fentanyl patch and takes tylenol.  Pain was 3/10 and currently 1/10 after taking tylenol.  She also endorses recent b/l LE swelling which she's never had before.  She is on lovenox 60mg Qdaily.  She denies any fever/chills, cp, sob, n/v/d, dysuria.  In ED, received 1L ivf.      SUBJECTIVE / OVERNIGHT EVENTS:  10/13: s/e at bedside, doing well, no new c/o or o/n events.  pain controlled.   10/14 - no cp palps sob or abdo pain; last BM yesterday - passing gas   10/15 - no cp palps sob; one episode of vomiting this morning; had lunch   10/16 - persistent nausea, vomited again this morning   10/17 - no cp palps sob; some cough, hypoxia, got CT chest   10/18 - no cp palps sob; nausea, lot of regurg    GENERAL: NAD, well-developed  HEAD:  Atraumatic, Normocephalic  EYES: EOMI, PERRLA, conjunctiva and sclera clear  ENT: normal hearing, no nasal discharge, throat clear, dentition normal, +THRUSH  NECK: Supple, No JVD, no LAD, no thyromegaly   CHEST/LUNG: Clear to auscultation bilaterally; No wheeze, respirations unlabored  HEART: Regular rate and rhythm; No murmurs, rubs, or gallops  ABDOMEN: Soft, +DIFFUSE TTP, MILDLY distended; Bowel sounds present,  EXTREMITIES:  2+ Peripheral Pulses, No clubbing, cyanosis, ++ LE nonpitting b/l edema  NEUROLOGY: non-focal, sensory and cn 2-12 intact, speech/language intact    RADIOLOGY/EKG:  < from: US Duplex Venous Lower Ext Complete, Bilateral (10.13.20 @ 09:33) >  No evidence of deep venous thrombosis in either lower extremity.  < from: Xray Abdomen 2 Views (10.14.20 @ 14:16) >  No acute intra-abdominal findings. No bowel obstruction.  Suspect ascites    < from: CT Abdomen and Pelvis w/ Oral Cont and w/ IV Cont (10.15.20 @ 20:34) >  No bowel obstruction.  Moderate ascites, minimally increased.    RADIOLOGY/EKG:  < from: CT Angio Chest PE Protocol w/ IV Cont (10.17.20 @ 14:37) >  No pulmonary embolus.  Patchy peribronchial vascular consolidations ofthe lung bases. The concurrent finding of a dilated and fluid-filled esophagus suggests that aspiration may be a likely etiology for this finding.    LABS: All Labs Reviewed:                        9.5    6.13  )-----------( 99       ( 18 Oct 2020 08:12 )             29.4     10-18    135  |  99  |  14  ----------------------------<  91  3.9   |  29  |  0.72      acetaminophen   Tablet .. 650 milliGRAM(s) Oral every 4 hours PRN  aluminum hydroxide/magnesium hydroxide/simethicone Suspension 30 milliLiter(s) Oral every 4 hours PRN  enoxaparin Injectable 90 milliGRAM(s) SubCutaneous daily  famotidine Injectable 20 milliGRAM(s) IV Push every 12 hours  fentaNYL   Patch  12 MICROgram(s)/Hr 1 Patch Transdermal every 72 hours  FIRST- Mouthwash  BLM 10 milliLiter(s) Swish and Spit two times a day  influenza   Vaccine 0.5 milliLiter(s) IntraMuscular once  LORazepam   Injectable 1 milliGRAM(s) IV Push three times a day  melatonin 5 milliGRAM(s) Oral at bedtime PRN  metoclopramide Injectable 10 milliGRAM(s) IV Push three times a day  morphine  - Injectable 2 milliGRAM(s) IV Push every 4 hours PRN  multivitamin/minerals 1 Tablet(s) Oral daily  ondansetron Injectable 4 milliGRAM(s) IV Push every 6 hours PRN  piperacillin/tazobactam IVPB.. 3.375 Gram(s) IV Intermittent every 8 hours  polyethylene glycol 3350 17 Gram(s) Oral daily  polyethylene glycol 3350 17 Gram(s) Oral two times a day  simethicone 80 milliGRAM(s) Chew three times a day PRN  sodium chloride 0.45%. 1000 milliLiter(s) IV Continuous <Continuous>

## 2020-10-19 NOTE — PROGRESS NOTE ADULT - SUBJECTIVE AND OBJECTIVE BOX
cc -nausea, vomiting , abdominal pain       65 female  w/PMH ovarian CA, omental mets, peritoneal ca, presents to ED on 10/12/20 to start new inpt chemo as sent in by her oncologist, Dr Fish.  She was recently at [facility] and had paracentesis w/ 2.4L drained.  She endorses diffuse abd pain that is constant for which she has fentanyl patch and takes tylenol.  Pain was 3/10 and currently 1/10 after taking tylenol.  She also endorses recent b/l LE swelling which she's never had before.  She is on lovenox 60mg Qdaily.  She denies any fever/chills, cp, sob, n/v/d, dysuria.  In ED, received 1L ivf.      SUBJECTIVE / OVERNIGHT EVENTS:  10/13: s/e at bedside, doing well, no new c/o or o/n events.  pain controlled.   10/14 - no cp palps sob or abdo pain; last BM yesterday - passing gas   10/15 - no cp palps sob; one episode of vomiting this morning; had lunch   10/16 - persistent nausea, vomited again this morning   10/17 - no cp palps sob; some cough, hypoxia, got CT chest   10/18 - no cp palps sob; nausea, lot of regurg  10/19 - pt seen and examined, + nausea, vomiting, poor po intake  Review of system- Rest of the review of system are negative except mentioned in HPI  T(C): 36.6 (10-19-20 @ 08:29), Max: 36.8 (10-18-20 @ 22:38)  T(F): 97.8 (10-19-20 @ 08:29), Max: 98.2 (10-18-20 @ 22:38)  HR: 86 (10-19-20 @ 08:29) (86 - 94)  BP: 125/75 (10-19-20 @ 08:29) (125/75 - 127/75)  RR: 16 (10-19-20 @ 08:29) (16 - 16)  SpO2: 97% (10-19-20 @ 08:29) (97% - 98%)  Wt(kg): --    GENERAL: NAD, well-developed  HEAD:  Atraumatic, Normocephalic  EYES: EOMI, PERRLA, conjunctiva and sclera clear  ENT: normal hearing, no nasal discharge, throat clear, dentition normal, +THRUSH  NECK: Supple, No JVD, no LAD, no thyromegaly   CHEST/LUNG: Clear to auscultation bilaterally; No wheeze, respirations unlabored  HEART: Regular rate and rhythm; No murmurs, rubs, or gallops  ABDOMEN: Soft, +DIFFUSE TTP, MILDLY distended; Bowel sounds present,  EXTREMITIES:  2+ Peripheral Pulses, No clubbing, cyanosis, ++ LE nonpitting b/l edema  NEUROLOGY: non-focal, sensory and cn 2-12 intact, speech/language intact    RADIOLOGY/EKG:  < from: 12 Lead ECG (10.12.20 @ 16:18) >    Ventricular Rate 84 BPM    Atrial Rate 84 BPM    P-R Interval 118 ms    QRS Duration 142 ms    Q-T Interval 394 ms    QTC Calculation(Bazett) 465 ms    P Axis 26 degrees    R Axis -65 degrees    T Axis 93 degrees    Diagnosis Line Normal sinus rhythm  Left axis deviation  Left bundle branch block  Abnormal ECG  When compared with ECG of 06-FEB-2020 07:18,  No significant change was found    < end of copied text >    < from: US Duplex Venous Lower Ext Complete, Bilateral (10.13.20 @ 09:33) >  No evidence of deep venous thrombosis in either lower extremity.  < from: Xray Abdomen 2 Views (10.14.20 @ 14:16) >  No acute intra-abdominal findings. No bowel obstruction.  Suspect ascites    < from: CT Abdomen and Pelvis w/ Oral Cont and w/ IV Cont (10.15.20 @ 20:34) >  No bowel obstruction.  Moderate ascites, minimally increased.  < from: US Abdomen Limited (10.18.20 @ 16:34) >  IMPRESSION:    Moderate ascites.    < end of copied text >  < from: CT Abdomen and Pelvis w/ Oral Cont and w/ IV Cont (10.15.20 @ 20:34) >  FINDINGS:  LOWER CHEST: Right lower lobe nodular opacities, possibly infectious or inflammatory. Left pleural plaque. Small left pleural effusion.    LIVER: Subcapsular fluid along the left hepatic lobe. 6 mm hepatic implant near the falciform ligament.  BILE DUCTS: Normal caliber.  GALLBLADDER: Within normal limits.  SPLEEN: Within normal limits.  PANCREAS: Within normal limits.  ADRENALS: Mild thickening.  KIDNEYS/URETERS: Subcentimeter renal hypodensities, too small to further characterize. Left parapelvic renal cysts. No hydronephrosis.    BLADDER: Minimally distended.  REPRODUCTIVE ORGANS: Hysterectomy.    BOWEL: Gastric wall thickening. No bowel obstruction. Appendix is not definitively identified. Right lower quadrant soft tissue density inseparable from the cecum and small bowel.  PERITONEUM: Moderate peritonealfluid, minimally increased. 1.5 cm right flank soft tissue implant.  VESSELS: Atherosclerotic changes.  RETROPERITONEUM/LYMPH NODES: Prominent retroperitoneal lymph nodes. Prominent mesenteric lymph nodes.  ABDOMINAL WALL: Postsurgical changes.  BONES: Degenerative changes.    IMPRESSION:  No bowel obstruction.  Moderate ascites, minimally increased.    < end of copied text >    RADIOLOGY/EKG:  < from: Xray UGI Single Contrast Study (10.19.20 @ 11:53) >    IMPRESSION: decreased motility of the thoracic esophagus. There was passage of barium into the stomach. The patient then proceeded to vomit the barium could not proceed with the examination..    < end of copied text >    < from: CT Angio Chest PE Protocol w/ IV Cont (10.17.20 @ 14:37) >  No pulmonary embolus.  Patchy peribronchial vascular consolidations ofthe lung bases. The concurrent finding of a dilated and fluid-filled esophagus suggests that aspiration may be a likely etiology for this finding.    LABS: All Labs Reviewed:                        9.5    6.13  )-----------( 99       ( 18 Oct 2020 08:12 )             29.4     10-18    135  |  99  |  14  ----------------------------<  91  3.9   |  29  |  0.72      MEDICATIONS  (STANDING):  dextrose 5% + sodium chloride 0.9%. 1000 milliLiter(s) (50 mL/Hr) IV Continuous <Continuous>  enoxaparin Injectable 90 milliGRAM(s) SubCutaneous daily  famotidine Injectable 20 milliGRAM(s) IV Push every 12 hours  FIRST- Mouthwash  BLM 10 milliLiter(s) Swish and Spit two times a day  influenza   Vaccine 0.5 milliLiter(s) IntraMuscular once  LORazepam   Injectable 1 milliGRAM(s) IV Push three times a day  metoclopramide Injectable 10 milliGRAM(s) IV Push three times a day  multivitamin/minerals 1 Tablet(s) Oral daily  piperacillin/tazobactam IVPB.. 3.375 Gram(s) IV Intermittent every 8 hours  polyethylene glycol 3350 17 Gram(s) Oral daily

## 2020-10-19 NOTE — PROGRESS NOTE ADULT - ASSESSMENT
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  symptoms persist without any relief?   will check esophagram/small bowel series now-spoke with radiology as patient is not improving.   can't r/o worsening disease? sbo? motility?   would consider mri of head if no improvement? brain mets?  Dr. Stanton obtaining records from [facility]/Dr. Kemp as recent esophagram was normal per patient.   discussed with Dr. Stanton, Dr. Altamirano, patient.

## 2020-10-19 NOTE — PROGRESS NOTE ADULT - ASSESSMENT
65F w/PMH ovarian CA, omental mets, peritoneal ca, presents to ED on 10/12/20 to start new inpt chemo as sent in by her oncologist, Dr Fish.   recently, paracentesis w/ 2.4L drained.  ++ diffuse abd pain that is constant, + b/l LE swelling which she's never had before.    +thrush    Recurrent Ovarian  cancer  s/p doxil   Malignant ascites Generalized abdominal pain  - s/p doxorubicin/decadron 10/14  -monitor, may need paracentesis.   10/18 - us abd - moderate ascites   Nausea, vomiting, GERD suspected esophageal dysmotility vs progression of carcinomatosis, vs SBO ( CT density next to SBO and cecum)   AXR - no obstruction   10/15 - for nausea/vomiting - use ZOFRAN/ATIVAN, cont IV PEPCID   10/16 - increase pepcid bid, cont zofran, ativan, add reglan atc   Oral thrush -  on nystatin S&S  H/O PE  10/17 - Hypoxic Resp Failure suspected aspirational  PNA  ruled out PE   got CT chest r/o PE as pt was undertreated with home dose of lovenox 60 , on lovenox should be on 90    CT negative for PE but shows dilated fluid-filled esophagus and consolidations suggestive of Aspiration PNA  called and discussed findings with pt - will start GN and anerobic coverage, c/w IV zosyn     Severe protein-calorie malnutrition. - supplements , glucerna, can not tolerate ensure    discussed with GI and [facility] ONC Dr Fish   Code status - DNR only    Dr Chidi Garcia's office in  - [facility] - [phone]

## 2020-10-19 NOTE — PROGRESS NOTE ADULT - SUBJECTIVE AND OBJECTIVE BOX
Notes/records were obtained from [place].  Copies forwarded to 1 N. and to Dr. Garsia's office.  Specifically patient had an esophagram which demonstrated normal esophagus, patency and no motility disorders.  Therefore a stent was not placed.  She did undergo a paracentesis.  I discussed her case with Dr. Chino Weiner from [facility] who are also recently seen the patient and in fact had referred her to [facility].  Their imaging studies suggested fluid-filled lower esophagus hence referral to [facility].  Etiology of her nausea etc. still remains somewhat unclear and will defer to GI. Notes/records were obtained from [place].  Copies forwarded to 1 N. and to Dr. Garsia's office.  Specifically patient had an esophagram which demonstrated normal esophagus, patency and no motility disorders.  Therefore a stent was not placed.  She did undergo a paracentesis.  I discussed her case with Dr. Chino Weiner from [facility] who are also recently seen the patient and in fact had referred her to [place].  Their imaging studies suggested fluid-filled lower esophagus hence referral to [place].  Etiology of her nausea etc.  unclear and will defer to GI.    Addendum    As per Dr Pena the Endoscopy revealed patent esophagus/ + motility problem  We  reviewed scans in cancer conference: There is thickening of the bowel ( small bowel)   and ascites  with possible mass effect on bowel; therefore bowel motility problems are  multifactorial and ultimately related to the cancer; Unclear if PEG will help alleviate  symptoms ( since issue may be distal to stomach) . Discussed  this with ;   He would like to ensure that comfort care is optimized; It is clear that chemotherapy will  not palliate her symptoms; will start Sandostain and defer PEG etc to GI

## 2020-10-19 NOTE — PROGRESS NOTE ADULT - SUBJECTIVE AND OBJECTIVE BOX
Patient is a 65y old  Female who presents with a chief complaint of chemo (18 Oct 2020 19:18)    HPI:  HPI:  65F w/PMH ovarian CA, omental mets, peritoneal ca, presents to ED to start new inpt chemo as sent in by her oncologist, Dr Fish.  She was recently at [facility] and had paracentesis w/ 2.4L drained.  She endorses diffuse abd pain that is constant for which she has fentanyl patch and takes tylenol.  Pain was 3/10 and currently 1/10 after taking tylenol.  She also endorses recent b/l LE swelling which she's never had before.  She is on lovenox 60mg Qdaily.  She denies any fever/chills, cp, sob, n/v/d, dysuria.      In ED, received 1L ivf.      10/19/2020-  No changes, still nauseous.     PAST MEDICAL & SURGICAL HISTORY:  Ovarian cancer  Constipation  Peritoneal carcinoma  Omental metastasis    PAST MEDICAL & SURGICAL HISTORY:  Ovarian cancer    Constipation    Peritoneal carcinoma    Omental metastasis    No significant past surgical history      MEDICATIONS  (STANDING):  enoxaparin Injectable 90 milliGRAM(s) SubCutaneous daily  famotidine Injectable 20 milliGRAM(s) IV Push every 12 hours  FIRST- Mouthwash  BLM 10 milliLiter(s) Swish and Spit two times a day  influenza   Vaccine 0.5 milliLiter(s) IntraMuscular once  LORazepam   Injectable 1 milliGRAM(s) IV Push three times a day  metoclopramide Injectable 10 milliGRAM(s) IV Push three times a day  multivitamin/minerals 1 Tablet(s) Oral daily  piperacillin/tazobactam IVPB.. 3.375 Gram(s) IV Intermittent every 8 hours  polyethylene glycol 3350 17 Gram(s) Oral daily  sodium chloride 0.45%. 1000 milliLiter(s) (60 mL/Hr) IV Continuous <Continuous>    MEDICATIONS  (PRN):  acetaminophen   Tablet .. 650 milliGRAM(s) Oral every 4 hours PRN Mild Pain (1 - 3)  aluminum hydroxide/magnesium hydroxide/simethicone Suspension 30 milliLiter(s) Oral every 4 hours PRN Dyspepsia  melatonin 5 milliGRAM(s) Oral at bedtime PRN Insomnia  morphine  - Injectable 2 milliGRAM(s) IV Push every 4 hours PRN Moderate Pain (4 - 6)  ondansetron Injectable 4 milliGRAM(s) IV Push every 6 hours PRN Nausea  simethicone 80 milliGRAM(s) Chew three times a day PRN Indigestion    Allergies    codeine (Unknown)  mineral oil (Rash)  shellfish (Unknown)    Intolerances    SOCIAL HISTORY:  FAMILY HISTORY:  FH: heart attack    REVIEW OF SYSTEMS:  CONSTITUTIONAL: + weakness, no fevers or chills  RESPIRATORY: No cough, wheezing, hemoptysis; No shortness of breath  CARDIOVASCULAR: No chest pain or palpitations  GASTROINTESTINAL: Per HPI    Vital Signs Last 24 Hrs  T(C): 36.6 (19 Oct 2020 08:29), Max: 36.8 (18 Oct 2020 22:38)  T(F): 97.8 (19 Oct 2020 08:29), Max: 98.2 (18 Oct 2020 22:38)  HR: 86 (19 Oct 2020 08:29) (86 - 94)  BP: 125/75 (19 Oct 2020 08:29) (119/75 - 127/75)  BP(mean): 86 (19 Oct 2020 08:29) (86 - 86)  RR: 16 (19 Oct 2020 08:29) (16 - 16)  SpO2: 97% (19 Oct 2020 08:29) (97% - 98%)    PHYSICAL EXAM:  Constitutional: Middle aged female, appears thin and frail, in NAD.   Respiratory: CTAB  Cardiovascular: S1 and S2, RRR, no M/G/R  Gastrointestinal: Distended/Firm, NT, +BS.     LABS:                        10.0   5.60  )-----------( 112      ( 19 Oct 2020 08:29 )             31.2     10-19    137  |  100  |  15  ----------------------------<  92  4.1   |  29  |  0.83    Ca    8.8      19 Oct 2020 08:29  Phos  3.2     10-18  Mg     2.0     10-19            RADIOLOGY & ADDITIONAL STUDIES:

## 2020-10-20 NOTE — PROVIDER CONTACT NOTE (OTHER) - BACKGROUND
Pt has a hx of ascites and has been experiencing nausea, vomiting and gastric reflux this admission. CT Angio showed fluid filled esophagus and patch consolidation of the lungs. Pt refused maalox.

## 2020-10-20 NOTE — PROGRESS NOTE ADULT - SUBJECTIVE AND OBJECTIVE BOX
Patient is a 65y old  Female who presents with a chief complaint of chemo (19 Oct 2020 18:45)    HPI:  HPI:  65F w/PMH ovarian CA, omental mets, peritoneal ca, presents to ED to start new inpt chemo as sent in by her oncologist, Dr Fish.  She was recently at [facility] and had paracentesis w/ 2.4L drained.  She endorses diffuse abd pain that is constant for which she has fentanyl patch and takes tylenol.  Pain was 3/10 and currently 1/10 after taking tylenol.  She also endorses recent b/l LE swelling which she's never had before.  She is on lovenox 60mg Qdaily.  She denies any fever/chills, cp, sob, n/v/d, dysuria.    In ED, received 1L ivf.      10/20/2020-  Pt feels mildly better this AM.  + BM last night.   But did not eat breakfast yet.   States she thinks she has an ulcer.   UGI noted decreased motility.     PAST MEDICAL & SURGICAL HISTORY:  Ovarian cancer    Constipation    Peritoneal carcinoma    Omental metastasis    No significant past surgical history      MEDICATIONS  (STANDING):  dextrose 5% + sodium chloride 0.9%. 1000 milliLiter(s) (50 mL/Hr) IV Continuous <Continuous>  enoxaparin Injectable 90 milliGRAM(s) SubCutaneous daily  famotidine Injectable 20 milliGRAM(s) IV Push every 12 hours  FIRST- Mouthwash  BLM 10 milliLiter(s) Swish and Spit two times a day  influenza   Vaccine 0.5 milliLiter(s) IntraMuscular once  LORazepam   Injectable 1 milliGRAM(s) IV Push three times a day  metoclopramide Injectable 10 milliGRAM(s) IV Push three times a day  multivitamin/minerals 1 Tablet(s) Oral daily  pantoprazole  Injectable 40 milliGRAM(s) IV Push daily  piperacillin/tazobactam IVPB.. 3.375 Gram(s) IV Intermittent every 8 hours  polyethylene glycol 3350 17 Gram(s) Oral daily    MEDICATIONS  (PRN):  acetaminophen   Tablet .. 650 milliGRAM(s) Oral every 4 hours PRN Mild Pain (1 - 3)  aluminum hydroxide/magnesium hydroxide/simethicone Suspension 30 milliLiter(s) Oral every 4 hours PRN Dyspepsia  melatonin 5 milliGRAM(s) Oral at bedtime PRN Insomnia  ondansetron Injectable 4 milliGRAM(s) IV Push every 6 hours PRN Nausea  simethicone 80 milliGRAM(s) Chew three times a day PRN Indigestion    Allergies    codeine (Unknown)  mineral oil (Rash)  shellfish (Unknown)    Intolerances      SOCIAL HISTORY:  FAMILY HISTORY:  FH: heart attack      REVIEW OF SYSTEMS:  CONSTITUTIONAL: + weakness/fatigue. no fevers or chills  RESPIRATORY: No cough, wheezing, hemoptysis; No shortness of breath  CARDIOVASCULAR: No chest pain or palpitations  GASTROINTESTINAL: Per HPI    Vital Signs Last 24 Hrs  T(C): 36.6 (19 Oct 2020 21:21), Max: 36.6 (19 Oct 2020 21:21)  T(F): 97.9 (19 Oct 2020 21:21), Max: 97.9 (19 Oct 2020 21:21)  HR: 91 (19 Oct 2020 21:21) (91 - 91)  BP: 126/75 (19 Oct 2020 21:21) (126/75 - 126/75)  BP(mean): --  RR: 17 (19 Oct 2020 21:21) (17 - 17)  SpO2: 98% (19 Oct 2020 21:21) (98% - 98%)    PHYSICAL EXAM:  Constitutional: Middle aged female in nad, appears frail, thin and weak.   Respiratory: CTAB  Cardiovascular: S1 and S2, RRR, no M/G/R  Gastrointestinal: Distended and firm, not tense, nt, +BS  LABS:                        9.1    4.74  )-----------( 85       ( 20 Oct 2020 07:09 )             27.7     10-20    137  |  102  |  13  ----------------------------<  109<H>  3.8   |  29  |  0.76    Ca    8.7      20 Oct 2020 07:09  Mg     2.0     10-19    TPro  5.9<L>  /  Alb  2.2<L>  /  TBili  0.3  /  DBili  0.1  /  AST  15  /  ALT  9<L>  /  AlkPhos  64  10-20      LIVER FUNCTIONS - ( 20 Oct 2020 07:09 )  Alb: 2.2 g/dL / Pro: 5.9 gm/dL / ALK PHOS: 64 U/L / ALT: 9 U/L / AST: 15 U/L / GGT: x             RADIOLOGY & ADDITIONAL STUDIES:

## 2020-10-20 NOTE — PROGRESS NOTE ADULT - ASSESSMENT
65F w/PMH ovarian CA, omental mets, peritoneal ca, presents to ED on 10/12/20 to start new inpt chemo as sent in by her oncologist, Dr Fish.   recently, paracentesis w/ 2.4L drained.  ++ diffuse abd pain that is constant, + b/l LE swelling which she's never had before.    +thrush  Nausea, vomiting, GERD suspected esophageal dysmotility vs progression of carcinomatosis, vs SBO ( CT density next to SBO and cecum)   AXR - no obstruction   10/15 - for nausea/vomiting - use ZOFRAN/ATIVAN, cont IV PEPCID   10/16 - increase pepcid bid, cont zofran, ativan, add reglan atc   GI consult - will plan EGD, add PPI   Recurrent Ovarian  cancer  s/p doxil   Malignant ascites Generalized abdominal pain  - s/p doxorubicin/decadron 10/14  -monitor, may need paracentesis.   10/18 - us abd - moderate ascites   Oral thrush -  on nystatin S&S  H/O PE, 10/17 - Hypoxic Resp Failure suspected aspirational  PNA  ruled out PE   got CT chest r/o PE as pt was undertreated with home dose of lovenox 60 , on lovenox should be on 90    CT negative for PE but shows dilated fluid-filled esophagus and consolidations suggestive of Aspiration PNA  called and discussed findings with pt - will start GN and anerobic coverage, c/w IV zosyn   Vitamin D deficiency - high dose   Severe protein-calorie malnutrition. - supplements , glucerna, can not tolerate ensure, gelatin     discussed with GI and HEM[facility] ONC Dr Fish   Code status - DNR only    Dr Chidi Garcia's office in  - [facility] - [phone]      updated 10/20   65F w/PMH ovarian CA, omental mets, peritoneal ca, presents to ED on 10/12/20 to start new inpt chemo as sent in by her oncologist, Dr Fish.   recently, paracentesis w/ 2.4L drained.  ++ diffuse abd pain that is constant, + b/l LE swelling which she's never had before.    +thrush  Nausea, vomiting, GERD suspected esophageal dysmotility vs progression of carcinomatosis, vs SBO ( CT density next to SBO and cecum)   AXR - no obstruction   10/15 - for nausea/vomiting - use ZOFRAN/ATIVAN, cont IV PEPCID   10/16 - increase pepcid bid, cont zofran, ativan, add reglan atc   MR brain - no metastasis, cerebrovascular disease with lacunar infarctions  GI consult - will plan EGD, add PPI   Recurrent Ovarian  cancer  s/p doxil   Malignant ascites Generalized abdominal pain  - s/p doxorubicin/decadron 10/14  -monitor, may need paracentesis.   10/18 - us abd - moderate ascites   Oral thrush -  on nystatin S&S  H/O PE, 10/17 - Hypoxic Resp Failure suspected aspirational  PNA  ruled out PE   got CT chest r/o PE as pt was undertreated with home dose of lovenox 60 , on lovenox should be on 90    CT negative for PE but shows dilated fluid-filled esophagus and consolidations suggestive of Aspiration PNA  called and discussed findings with pt - will start GN and anerobic coverage, c/w IV zosyn   Vitamin D deficiency - high dose   Severe protein-calorie malnutrition. 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

## 2020-10-20 NOTE — PROGRESS NOTE ADULT - SUBJECTIVE AND OBJECTIVE BOX
cc -nausea, vomiting , abdominal pain       65 female  w/PMH ovarian CA, omental mets, peritoneal ca, presents to ED on 10/12/20 to start new inpt chemo as sent in by her oncologist, Dr Fish.  She was recently at [facility] and had paracentesis w/ 2.4L drained.  She endorses diffuse abd pain that is constant for which she has fentanyl patch and takes tylenol.  Pain was 3/10 and currently 1/10 after taking tylenol.  She also endorses recent b/l LE swelling which she's never had before.  She is on lovenox 60mg Qdaily.  She denies any fever/chills, cp, sob, n/v/d, dysuria.  In ED, received 1L ivf.      SUBJECTIVE / OVERNIGHT EVENTS:  10/13: s/e at bedside, doing well, no new c/o or o/n events.  pain controlled.   10/14 - no cp palps sob or abdo pain; last BM yesterday - passing gas   10/15 - no cp palps sob; one episode of vomiting this morning; had lunch   10/16 - persistent nausea, vomited again this morning   10/17 - no cp palps sob; some cough, hypoxia, got CT chest   10/18 - no cp palps sob; nausea, lot of regurg  10/19 - pt seen and examined, + nausea, vomiting, poor po intake  10/20 - pt seen and examined, + nausea, + vomiting, + epigastric pain, afebrile, POC discussed   Review of system- Rest of the review of system are negative except mentioned in HPI    T(C): 36.5 (10-20-20 @ 17:23), Max: 36.7 (10-20-20 @ 14:46)  T(F): 97.7 (10-20-20 @ 17:23), Max: 98.1 (10-20-20 @ 14:46)  HR: 83 (10-20-20 @ 17:23) (83 - 107)  BP: 123/74 (10-20-20 @ 17:23) (120/91 - 126/75)  RR: 16 (10-20-20 @ 17:23) (16 - 17)  SpO2: 96% (10-20-20 @ 17:23) (93% - 98%)  Wt(kg): --    GENERAL: NAD, well-developed  HEAD:  Atraumatic, Normocephalic  EYES: EOMI, PERRLA, conjunctiva and sclera clear  ENT: normal hearing, no nasal discharge, throat clear, dentition normal, +THRUSH  NECK: Supple, No JVD, no LAD, no thyromegaly   CHEST/LUNG: Clear to auscultation bilaterally; No wheeze, respirations unlabored  HEART: Regular rate and rhythm; No murmurs, rubs, or gallops  ABDOMEN: Soft, +DIFFUSE TTP, MILDLY distended; Bowel sounds present,  EXTREMITIES:  2+ Peripheral Pulses, No clubbing, cyanosis, ++ LE nonpitting b/l edema  NEUROLOGY: non-focal, sensory and cn 2-12 intact, speech/language intact    RADIOLOGY/EKG:  < from: 12 Lead ECG (10.12.20 @ 16:18) >    Ventricular Rate 84 BPM    Atrial Rate 84 BPM    P-R Interval 118 ms    QRS Duration 142 ms    Q-T Interval 394 ms    QTC Calculation(Bazett) 465 ms    P Axis 26 degrees    R Axis -65 degrees    T Axis 93 degrees    Diagnosis Line Normal sinus rhythm  Left axis deviation  Left bundle branch block  Abnormal ECG  When compared with ECG of 06-FEB-2020 07:18,  No significant change was found    < end of copied text >    < from: US Duplex Venous Lower Ext Complete, Bilateral (10.13.20 @ 09:33) >  No evidence of deep venous thrombosis in either lower extremity.  < from: Xray Abdomen 2 Views (10.14.20 @ 14:16) >  No acute intra-abdominal findings. No bowel obstruction.  Suspect ascites    < from: CT Abdomen and Pelvis w/ Oral Cont and w/ IV Cont (10.15.20 @ 20:34) >  No bowel obstruction.  Moderate ascites, minimally increased.  < from: US Abdomen Limited (10.18.20 @ 16:34) >  IMPRESSION:    Moderate ascites.    < end of copied text >  < from: CT Abdomen and Pelvis w/ Oral Cont and w/ IV Cont (10.15.20 @ 20:34) >  FINDINGS:  LOWER CHEST: Right lower lobe nodular opacities, possibly infectious or inflammatory. Left pleural plaque. Small left pleural effusion.    LIVER: Subcapsular fluid along the left hepatic lobe. 6 mm hepatic implant near the falciform ligament.  BILE DUCTS: Normal caliber.  GALLBLADDER: Within normal limits.  SPLEEN: Within normal limits.  PANCREAS: Within normal limits.  ADRENALS: Mild thickening.  KIDNEYS/URETERS: Subcentimeter renal hypodensities, too small to further characterize. Left parapelvic renal cysts. No hydronephrosis.    BLADDER: Minimally distended.  REPRODUCTIVE ORGANS: Hysterectomy.    BOWEL: Gastric wall thickening. No bowel obstruction. Appendix is not definitively identified. Right lower quadrant soft tissue density inseparable from the cecum and small bowel.  PERITONEUM: Moderate peritonealfluid, minimally increased. 1.5 cm right flank soft tissue implant.  VESSELS: Atherosclerotic changes.  RETROPERITONEUM/LYMPH NODES: Prominent retroperitoneal lymph nodes. Prominent mesenteric lymph nodes.  ABDOMINAL WALL: Postsurgical changes.  BONES: Degenerative changes.    IMPRESSION:  No bowel obstruction.  Moderate ascites, minimally increased.    < end of copied text >    RADIOLOGY/EKG:  < from: Xray UGI Single Contrast Study (10.19.20 @ 11:53) >    IMPRESSION: decreased motility of the thoracic esophagus. There was passage of barium into the stomach. The patient then proceeded to vomit the barium could not proceed with the examination..    < end of copied text >    < from: CT Angio Chest PE Protocol w/ IV Cont (10.17.20 @ 14:37) >  No pulmonary embolus.  Patchy peribronchial vascular consolidations ofthe lung bases. The concurrent finding of a dilated and fluid-filled esophagus suggests that aspiration may be a likely etiology for this finding.    LABS: All Labs Reviewed:  10-20    137  |  102  |  13  ----------------------------<  109<H>  3.8   |  29  |  0.76    Ca    8.7      20 Oct 2020 07:09  Mg     2.0     10-19    TPro  5.9<L>  /  Alb  2.2<L>  /  TBili  0.3  /  DBili  0.1  /  AST  15  /  ALT  9<L>  /  AlkPhos  64  10-20                            9.1    4.74  )-----------( 85       ( 20 Oct 2020 07:09 )             27.7             LIVER FUNCTIONS - ( 20 Oct 2020 07:09 )  Alb: 2.2 g/dL / Pro: 5.9 gm/dL / ALK PHOS: 64 U/L / ALT: 9 U/L / AST: 15 U/L / GGT: x                                   9.5    6.13  )-----------( 99       ( 18 Oct 2020 08:12 )             29.4     10-18    135  |  99  |  14  ----------------------------<  91  3.9   |  29  |  0.72      MEDICATIONS  (STANDING):  dextrose 5% + sodium chloride 0.9%. 1000 milliLiter(s) (50 mL/Hr) IV Continuous <Continuous>  enoxaparin Injectable 90 milliGRAM(s) SubCutaneous daily  famotidine Injectable 20 milliGRAM(s) IV Push every 12 hours  FIRST- Mouthwash  BLM 10 milliLiter(s) Swish and Spit two times a day  influenza   Vaccine 0.5 milliLiter(s) IntraMuscular once  LORazepam   Injectable 1 milliGRAM(s) IV Push three times a day  metoclopramide Injectable 10 milliGRAM(s) IV Push three times a day  multivitamin/minerals 1 Tablet(s) Oral daily  piperacillin/tazobactam IVPB.. 3.375 Gram(s) IV Intermittent every 8 hours  polyethylene glycol 3350 17 Gram(s) Oral daily                               cc -nausea, vomiting , abdominal pain       65 female  w/PMH ovarian CA, omental mets, peritoneal ca, presents to ED on 10/12/20 to start new inpt chemo as sent in by her oncologist, Dr Fish.  She was recently at [facility] and had paracentesis w/ 2.4L drained.  She endorses diffuse abd pain that is constant for which she has fentanyl patch and takes tylenol.  Pain was 3/10 and currently 1/10 after taking tylenol.  She also endorses recent b/l LE swelling which she's never had before.  She is on lovenox 60mg Qdaily.  She denies any fever/chills, cp, sob, n/v/d, dysuria.  In ED, received 1L ivf.      SUBJECTIVE / OVERNIGHT EVENTS:  10/13: s/e at bedside, doing well, no new c/o or o/n events.  pain controlled.   10/14 - no cp palps sob or abdo pain; last BM yesterday - passing gas   10/15 - no cp palps sob; one episode of vomiting this morning; had lunch   10/16 - persistent nausea, vomited again this morning   10/17 - no cp palps sob; some cough, hypoxia, got CT chest   10/18 - no cp palps sob; nausea, lot of regurg  10/19 - pt seen and examined, + nausea, vomiting, poor po intake  10/20 - pt seen and examined, + nausea, + vomiting, + epigastric pain, afebrile, POC discussed   Review of system- Rest of the review of system are negative except mentioned in HPI    T(C): 36.5 (10-20-20 @ 17:23), Max: 36.7 (10-20-20 @ 14:46)  T(F): 97.7 (10-20-20 @ 17:23), Max: 98.1 (10-20-20 @ 14:46)  HR: 83 (10-20-20 @ 17:23) (83 - 107)  BP: 123/74 (10-20-20 @ 17:23) (120/91 - 126/75)  RR: 16 (10-20-20 @ 17:23) (16 - 17)  SpO2: 96% (10-20-20 @ 17:23) (93% - 98%)  Wt(kg): --    GENERAL: NAD, well-developed  HEAD:  Atraumatic, Normocephalic  EYES: EOMI, PERRLA, conjunctiva and sclera clear  ENT: normal hearing, no nasal discharge, throat clear, dentition normal, +THRUSH  NECK: Supple, No JVD, no LAD, no thyromegaly   CHEST/LUNG: Clear to auscultation bilaterally; No wheeze, respirations unlabored  HEART: Regular rate and rhythm; No murmurs, rubs, or gallops  ABDOMEN: Soft, +DIFFUSE TTP, MILDLY distended; Bowel sounds present,  EXTREMITIES:  2+ Peripheral Pulses, No clubbing, cyanosis, ++ LE nonpitting b/l edema  NEUROLOGY: non-focal, sensory and cn 2-12 intact, speech/language intact    RADIOLOGY/EKG:  < from: 12 Lead ECG (10.12.20 @ 16:18) >    Ventricular Rate 84 BPM    Atrial Rate 84 BPM    P-R Interval 118 ms    QRS Duration 142 ms    Q-T Interval 394 ms    QTC Calculation(Bazett) 465 ms    P Axis 26 degrees    R Axis -65 degrees    T Axis 93 degrees    Diagnosis Line Normal sinus rhythm  Left axis deviation  Left bundle branch block  Abnormal ECG  When compared with ECG of 06-FEB-2020 07:18,  No significant change was found    < end of copied text >    < from: MR Head w/wo IV Cont (10.20.20 @ 15:50) >  No acute infarct or intracranial hemorrhage is identified.    There is afocus of mild enhancement involving the left luis with associated T2/FLAIR hyperintensity (19-8). There is no associated reduced diffusion or susceptibility related signal loss.    Chronic lacunar infarcts are noted involving the midbrain and scattered FLAIR hyperintense white matter foci are noted, especially within the luis.    Developmental venous anomalies noted in the right temporal-occipital and left parietal regions.    No hydrocephalus. No extra-axial fluid collections. The skull base flow voids are present.    The visualized intraorbital contents are normal. The imaged portions of the paranasal sinuses are clear. The mastoid air cells are clear.    IMPRESSION:    Focus of mild enhancement in the left luis most compatible with subacute infarct in the setting of multiple chronic brainstem lacunar infarcts and microvascular ischemic changes. Nonetheless, given cancer history, a follow-up in 4-6 weeks is recommended to ensure resolution.    < end of copied text >  < from: US Duplex Venous Lower Ext Complete, Bilateral (10.13.20 @ 09:33) >  No evidence of deep venous thrombosis in either lower extremity.  < from: Xray Abdomen 2 Views (10.14.20 @ 14:16) >  No acute intra-abdominal findings. No bowel obstruction.  Suspect ascites    < from: CT Abdomen and Pelvis w/ Oral Cont and w/ IV Cont (10.15.20 @ 20:34) >  No bowel obstruction.  Moderate ascites, minimally increased.  < from: US Abdomen Limited (10.18.20 @ 16:34) >  IMPRESSION:    Moderate ascites.    < end of copied text >  < from: CT Abdomen and Pelvis w/ Oral Cont and w/ IV Cont (10.15.20 @ 20:34) >  FINDINGS:  LOWER CHEST: Right lower lobe nodular opacities, possibly infectious or inflammatory. Left pleural plaque. Small left pleural effusion.    LIVER: Subcapsular fluid along the left hepatic lobe. 6 mm hepatic implant near the falciform ligament.  BILE DUCTS: Normal caliber.  GALLBLADDER: Within normal limits.  SPLEEN: Within normal limits.  PANCREAS: Within normal limits.  ADRENALS: Mild thickening.  KIDNEYS/URETERS: Subcentimeter renal hypodensities, too small to further characterize. Left parapelvic renal cysts. No hydronephrosis.    BLADDER: Minimally distended.  REPRODUCTIVE ORGANS: Hysterectomy.    BOWEL: Gastric wall thickening. No bowel obstruction. Appendix is not definitively identified. Right lower quadrant soft tissue density inseparable from the cecum and small bowel.  PERITONEUM: Moderate peritonealfluid, minimally increased. 1.5 cm right flank soft tissue implant.  VESSELS: Atherosclerotic changes.  RETROPERITONEUM/LYMPH NODES: Prominent retroperitoneal lymph nodes. Prominent mesenteric lymph nodes.  ABDOMINAL WALL: Postsurgical changes.  BONES: Degenerative changes.    IMPRESSION:  No bowel obstruction.  Moderate ascites, minimally increased.    < end of copied text >    RADIOLOGY/EKG:  < from: Xray UGI Single Contrast Study (10.19.20 @ 11:53) >    IMPRESSION: decreased motility of the thoracic esophagus. There was passage of barium into the stomach. The patient then proceeded to vomit the barium could not proceed with the examination..    < end of copied text >    < from: CT Angio Chest PE Protocol w/ IV Cont (10.17.20 @ 14:37) >  No pulmonary embolus.  Patchy peribronchial vascular consolidations ofthe lung bases. The concurrent finding of a dilated and fluid-filled esophagus suggests that aspiration may be a likely etiology for this finding.    LABS: All Labs Reviewed:  10-20    137  |  102  |  13  ----------------------------<  109<H>  3.8   |  29  |  0.76    Ca    8.7      20 Oct 2020 07:09  Mg     2.0     10-19    TPro  5.9<L>  /  Alb  2.2<L>  /  TBili  0.3  /  DBili  0.1  /  AST  15  /  ALT  9<L>  /  AlkPhos  64  10-20                            9.1    4.74  )-----------( 85       ( 20 Oct 2020 07:09 )             27.7             LIVER FUNCTIONS - ( 20 Oct 2020 07:09 )  Alb: 2.2 g/dL / Pro: 5.9 gm/dL / ALK PHOS: 64 U/L / ALT: 9 U/L / AST: 15 U/L / GGT: x                                   9.5    6.13  )-----------( 99       ( 18 Oct 2020 08:12 )             29.4     10-18    135  |  99  |  14  ----------------------------<  91  3.9   |  29  |  0.72      MEDICATIONS  (STANDING):  dextrose 5% + sodium chloride 0.9%. 1000 milliLiter(s) (50 mL/Hr) IV Continuous <Continuous>  enoxaparin Injectable 90 milliGRAM(s) SubCutaneous daily  famotidine Injectable 20 milliGRAM(s) IV Push every 12 hours  FIRST- Mouthwash  BLM 10 milliLiter(s) Swish and Spit two times a day  influenza   Vaccine 0.5 milliLiter(s) IntraMuscular once  LORazepam   Injectable 1 milliGRAM(s) IV Push three times a day  metoclopramide Injectable 10 milliGRAM(s) IV Push three times a day  multivitamin/minerals 1 Tablet(s) Oral daily  piperacillin/tazobactam IVPB.. 3.375 Gram(s) IV Intermittent every 8 hours  polyethylene glycol 3350 17 Gram(s) Oral daily

## 2020-10-20 NOTE — PROGRESS NOTE ADULT - ASSESSMENT
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  symptoms persist without any relief? occasionally pepcid helps for indigestion.   will add pantoprazole now, pt believes she has an ulcer?   UGI series/small bowel study was terminated due to n/v but did note decreased motility.    Would consider mri of head if no improvement? brain mets?  Reviewed records from [facility]- esophagram x1-2 weeks ago was negative.   Discussed with Dr. Stanton, Dr. Altamirano, patient.

## 2020-10-20 NOTE — PROGRESS NOTE ADULT - ATTENDING COMMENTS
She continues to have persistent n/v  I have reviewed all studies - unclear if motility issue vs partial obstruction from disease  plan image brain   will check endoscopy  - timing to be determined
She did not tolerate contrast as came right back up - I will speak with dr camejo at [place] regarding this findings, imaging and plans    Unclear that stent would help as likely extrinsic and at risk of migration  ?motility issue but also unclear as to what may help
See NP note for details. Will use Reglan and increased Ativan. If no improvement, consider MRI of brain.

## 2020-10-21 NOTE — PROVIDER CONTACT NOTE (OTHER) - BACKGROUND
Pt has a history of ascites, recently taped at [facility] on 10/1, recent scans showed that the ascites is increasing. CT Angio showed dilated fluid-filled esophagus and consolidations in the lungs.

## 2020-10-21 NOTE — PROGRESS NOTE ADULT - SUBJECTIVE AND OBJECTIVE BOX
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  She is status post Doxil 50 mg per metered squared and due for a second cycle in about 2 to 3 weeks.  She has been unable to keep down food.  She is being evaluated by a GI.      REVIEW OF SYSTEMS:      Allergies    codeine (Unknown)  mineral oil (Rash)  shellfish (Unknown)    Intolerances        MEDICATIONS  (STANDING):  bisacodyl 5 milliGRAM(s) Oral at bedtime  enoxaparin Injectable 90 milliGRAM(s) SubCutaneous daily  ergocalciferol 19919 Unit(s) Oral <User Schedule>  famotidine Injectable 20 milliGRAM(s) IV Push every 12 hours  FIRST- Mouthwash  BLM 10 milliLiter(s) Swish and Spit two times a day  guaiFENesin   Syrup  (Sugar-Free) 200 milliGRAM(s) Oral every 6 hours  influenza   Vaccine 0.5 milliLiter(s) IntraMuscular once  melatonin 3 milliGRAM(s) Oral at bedtime  metoclopramide Injectable 10 milliGRAM(s) IV Push three times a day  mirtazapine 7.5 milliGRAM(s) Oral at bedtime  multivitamin/minerals 1 Tablet(s) Oral daily  pantoprazole  Injectable 40 milliGRAM(s) IV Push daily  piperacillin/tazobactam IVPB.. 3.375 Gram(s) IV Intermittent every 8 hours    MEDICATIONS  (PRN):  acetaminophen   Tablet .. 650 milliGRAM(s) Oral every 4 hours PRN Mild Pain (1 - 3)  aluminum hydroxide/magnesium hydroxide/simethicone Suspension 30 milliLiter(s) Oral every 4 hours PRN Dyspepsia  LORazepam   Injectable 0.5 milliGRAM(s) IV Push every 6 hours PRN for nausea if reglan not effective  polyethylene glycol 3350 17 Gram(s) Oral daily PRN if no BM for 2 days  simethicone 80 milliGRAM(s) Chew three times a day PRN Indigestion      Vital Signs Last 24 Hrs  T(C): 36.6 (21 Oct 2020 15:56), Max: 36.6 (20 Oct 2020 20:44)  T(F): 97.9 (21 Oct 2020 15:56), Max: 97.9 (20 Oct 2020 20:44)  HR: 90 (21 Oct 2020 15:56) (81 - 95)  BP: 130/74 (21 Oct 2020 15:56) (122/68 - 134/67)  BP(mean): --  RR: 16 (21 Oct 2020 15:56) (16 - 16)  SpO2: 94% (21 Oct 2020 15:56) (90% - 95%)    PHYSICAL EXAM:    GENERAL: NAD,   HEAD:  Atraumatic, Normocephalic  EYES: EOMI, PERRLA, conjunctiva and sclera clear    NECK: Supple, No JVD, Normal thyroid  NERVOUS SYSTEM:    CHEST/LUNG: Clear to percussion bilaterally; No rales, rhonchi,   HEART: Regular rate and rhythm;   ABDOMEN: Soft, Nontender.  EXTREMITIES:   edema:-  LYMPH: No lymphadenopathy noted        LABS:                        9.0    6.28  )-----------( 79       ( 21 Oct 2020 10:45 )             27.3     10-21    140  |  107  |  13  ----------------------------<  118<H>  3.6   |  28  |  0.78    Ca    8.7      21 Oct 2020 10:45    TPro  5.9<L>  /  Alb  2.3<L>  /  TBili  0.5  /  DBili  x   /  AST  18  /  ALT  9<L>  /  AlkPhos  71  10-21            RADIOLOGY & ADDITIONAL STUDIES:    < from: Xray UGI Single Contrast Study (10.19.20 @ 11:53) >  EXAM:  XR UGI SNGL CON STDY                            PROCEDURE DATE:  10/19/2020          INTERPRETATION:  Esophagram single contrast    CLINICAL INFORMATION: Nausea vomiting    TECHNIQUE:  A  radiograph was obtained.  Dilute barium was given by mouth.   Evaluation was performed with fluoroscopy and overhead radiographs.  Total time of fluoroscopic exposure for the patient during this examination was 0.9 minutes.    FINDINGS:   No prior exams are available for review.    There was decreased motility of the thoracic esophagus. There was passage of barium into the stomach. The patient then proceeded to vomit the barium could not proceed with the examination..    The gastroesophageal junction appeared intact.  No hernia was recognized.    IMPRESSION: decreased motility of the thoracic esophagus. There was passage of barium into the stomach. The patient then proceeded to vomit the barium could not proceed with the examination..        < from: CT Abdomen and Pelvis w/ Oral Cont and w/ IV Cont (10.15.20 @ 20:34) >    EXAM:  CT ABDOMEN AND PELVIS OC IC                            PROCEDURE DATE:  10/15/2020          INTERPRETATION:  CLINICAL INFORMATION: Vomiting    COMPARISON: 9/17/2020    PROCEDURE:  CT of the Abdomen and Pelvis was performed with intravenous contrast.  Intravenous contrast: 90 ml Omnipaque 350. 10 ml discarded.  Oral contrast: positive contrast was administered.  Sagittal and coronal reformats were performed.    FINDINGS:  LOWER CHEST: Right lower lobe nodular opacities, possibly infectious or inflammatory. Left pleural plaque. Small left pleural effusion.    LIVER: Subcapsular fluid along the left hepatic lobe. 6 mm hepatic implant near the falciform ligament.  BILE DUCTS: Normal caliber.  GALLBLADDER: Within normal limits.  SPLEEN: Within normal limits.  PANCREAS: Within normal limits.  ADRENALS: Mild thickening.  KIDNEYS/URETERS: Subcentimeter renal hypodensities, too small to further characterize. Left parapelvic renal cysts. No hydronephrosis.    BLADDER: Minimally distended.  REPRODUCTIVE ORGANS: Hysterectomy.    BOWEL: Gastric wall thickening. No bowel obstruction. Appendix is not definitively identified. Right lower quadrant soft tissue density inseparable from the cecum and small bowel.  PERITONEUM: Moderate peritonealfluid, minimally increased. 1.5 cm right flank soft tissue implant.  VESSELS: Atherosclerotic changes.  RETROPERITONEUM/LYMPH NODES: Prominent retroperitoneal lymph nodes. Prominent mesenteric lymph nodes.  ABDOMINAL WALL: Postsurgical changes.  BONES: Degenerative changes.    IMPRESSION:  No bowel obstruction.  Moderate ascites, minimally increased.      < end of copied text >

## 2020-10-21 NOTE — PROGRESS NOTE ADULT - ASSESSMENT
65F w/PMH ovarian CA, omental mets, peritoneal ca, presents to ED on 10/12/20 to start new inpt chemo as sent in by her oncologist, Dr Fish.   recently, paracentesis w/ 2.4L drained.  ++ diffuse abd pain that is constant, + b/l LE swelling which she's never had before.    +thrush  Nausea, vomiting, GERD suspected esophageal dysmotility with  possible  progression of carcinomatosis, small bowel involvement    ( CT density next to SBO and cecum)   antiemetics - stop zofran - pt feels worse after zofran , more mucus, c/w lower dose of ativan 0.5 q6h prn and reglan around the clock   cont IV pepcid and PPI   MR brain - no metastasis, cerebrovascular disease with lacunar infarctions  GI consult Dr. 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   10/18 - us abd - moderate ascites      h/o PE on lovenox , 10/17 - Hypoxic Resp Failure suspected aspirational  PNA  ruled out PE   CTA - neg for PE , c/w  lovenox 90  , CT negative for PE   called and discussed findings with pt - will start GN and anerobic coverage, c/w IV zosyn   add robitusin , CXR in am, incentive spirometry  Left Sari subacute infarction with chronic cerebrovascular disease  - start ASA 81 and statins, LDL 75  Vitamin D deficiency - high dose replacement  Severe protein-calorie malnutrition- supplements , glucerna, can not tolerate ensure, gelatin   add remeron 7.5 mg hs, 10/21/20    Advanced directives   - MOLST form completed DNR/DNI, trial of feeding tube 17 min spend  -poor prognosis - d/w Dr. Fish     discussed with GI and HEME ONC Dr Abe Garcia's office in AM - [facility] - [phone]      updated 10/20, 10/21

## 2020-10-21 NOTE — PROGRESS NOTE ADULT - ASSESSMENT
Recurrent ovarian cancer that is platinum resistant  Patient had a very early relapse following platinum therefore prognosis is guarded  Has received systemic chemotherapy with Doxil x1.  The latter is dosed every 3 to 4 weeks.  She would be due in about 2 to 3 weeks  No clinical improvement since admission  Patient has ongoing issues related to keeping her food down.  Apparently has esophageal motility issues.  In addition review of scans suggest small bowel thickening/metastases.      Plan    In terms of systemic therapy for ovarian cancer the next dose is due in the next 2 to 3 weeks.  She does not improve with her first cycle of therapy and it is not clear that she will derive meaningful benefit from further chemotherapy.  She has ongoing bowel issues most likely related to metastatic disease.  Esophageal dysmotility may be due to splenic/diaphragmatic involvement resulting in esophageal dysmotility (this was discussed in tumor board with surgery).  In addition it seems she has small bowel involvement/omental involvement making the problem more distal.    Recommendations  Prognosis is guarded to poor  Unclear whether further chemotherapy will be of any meaningful benefit  GI follow-up to discuss nutritional support if warranted.  We did discuss whether a PEG tube would benefit however if the motility issues are beyond the stomach then a PEG tube may not be of limited use  Would continue maximizing palliative care

## 2020-10-21 NOTE — PROGRESS NOTE ADULT - SUBJECTIVE AND OBJECTIVE BOX
cc -nausea, vomiting , abdominal pain       65 female  w/PMH ovarian CA, omental mets, peritoneal ca, presents to ED on 10/12/20 to start new inpt chemo as sent in by her oncologist, Dr Fish.  She was recently at [facility] and had paracentesis w/ 2.4L drained.  She endorses diffuse abd pain that is constant for which she has fentanyl patch and takes tylenol.  Pain was 3/10 and currently 1/10 after taking tylenol.  She also endorses recent b/l LE swelling which she's never had before.  She is on lovenox 60mg Qdaily.  She denies any fever/chills, cp, sob, n/v/d, dysuria.  In ED, received 1L ivf.      SUBJECTIVE / OVERNIGHT EVENTS:  10/13: s/e at bedside, doing well, no new c/o or o/n events.  pain controlled.   10/14 - no cp palps sob or abdo pain; last BM yesterday - passing gas   10/15 - no cp palps sob; one episode of vomiting this morning; had lunch   10/16 - persistent nausea, vomited again this morning   10/17 - no cp palps sob; some cough, hypoxia, got CT chest   10/18 - no cp palps sob; nausea, lot of regurg  10/19 - pt seen and examined, + nausea, vomiting, poor po intake  10/20 - pt seen and examined, + nausea, + vomiting, + epigastric pain, afebrile, POC discussed   10/21 - pt seen and examined, + secretions, + cough, + nausea, denies cp, + some abdominal discomfort, POC discussed   Review of system- Rest of the review of system are negative except mentioned in HPI    T(C): 36.6 (10-21-20 @ 15:56), Max: 36.6 (10-20-20 @ 20:44)  T(F): 97.9 (10-21-20 @ 15:56), Max: 97.9 (10-20-20 @ 20:44)  HR: 90 (10-21-20 @ 15:56) (81 - 95)  BP: 130/74 (10-21-20 @ 15:56) (122/68 - 134/67)  RR: 16 (10-21-20 @ 15:56) (16 - 16)  SpO2: 94% (10-21-20 @ 15:56) (90% - 95%)  Wt(kg): --      GENERAL: NAD, well-developed  HEAD:  Atraumatic, Normocephalic  EYES: EOMI, PERRLA, conjunctiva and sclera clear  ENT: normal hearing, no nasal discharge, throat clear, dentition normal, +THRUSH  NECK: Supple, No JVD, no LAD, no thyromegaly   CHEST/LUNG: Clear to auscultation bilaterally; No wheeze, respirations unlabored  HEART: Regular rate and rhythm; No murmurs, rubs, or gallops  ABDOMEN: Soft, +DIFFUSE TTP, MILDLY distended; Bowel sounds present,  EXTREMITIES:  2+ Peripheral Pulses, No clubbing, cyanosis, ++ LE nonpitting b/l edema  NEUROLOGY: non-focal, sensory and cn 2-12 intact, speech/language intact    RADIOLOGY/EKG:  < from: 12 Lead ECG (10.12.20 @ 16:18) >    Ventricular Rate 84 BPM    Atrial Rate 84 BPM    P-R Interval 118 ms    QRS Duration 142 ms    Q-T Interval 394 ms    QTC Calculation(Bazett) 465 ms    P Axis 26 degrees    R Axis -65 degrees    T Axis 93 degrees    Diagnosis Line Normal sinus rhythm  Left axis deviation  Left bundle branch block  Abnormal ECG  When compared with ECG of 06-FEB-2020 07:18,  No significant change was found    < end of copied text >  < from: Upper Endoscopy (10.21.20 @ 07:13) >  FINDING Findings:    FINDING      Fluid was found in the entire esophagus.    FINDING      A small hiatal hernia was present.    FINDING      The examined duodenum was normal.    FINDING      The lumen of the esophagus was moderately dilated.    FINDING      Dilated fluid and debris filled esophagus without sign of obstruction    COMPLIC Complications:       No immediate complications.    IMPRESS Impression:          - Fluid in the esophagus.    IMPRESS                      - Small hiatal hernia.    IMPRESS                      - Normal examined duodenum.    IMPRESS                      - Dilation in the entire esophagus.    IMPRESS                      - No specimens collected.      < end of copied text >    < from: MR Head w/wo IV Cont (10.20.20 @ 15:50) >  No acute infarct or intracranial hemorrhage is identified.    There is afocus of mild enhancement involving the left luis with associated T2/FLAIR hyperintensity (19-8). There is no associated reduced diffusion or susceptibility related signal loss.    Chronic lacunar infarcts are noted involving the midbrain and scattered FLAIR hyperintense white matter foci are noted, especially within the luis.    Developmental venous anomalies noted in the right temporal-occipital and left parietal regions.    No hydrocephalus. No extra-axial fluid collections. The skull base flow voids are present.    The visualized intraorbital contents are normal. The imaged portions of the paranasal sinuses are clear. The mastoid air cells are clear.    IMPRESSION:    Focus of mild enhancement in the left luis most compatible with subacute infarct in the setting of multiple chronic brainstem lacunar infarcts and microvascular ischemic changes. Nonetheless, given cancer history, a follow-up in 4-6 weeks is recommended to ensure resolution.    < end of copied text >  < from: US Duplex Venous Lower Ext Complete, Bilateral (10.13.20 @ 09:33) >  No evidence of deep venous thrombosis in either lower extremity.  < from: Xray Abdomen 2 Views (10.14.20 @ 14:16) >  No acute intra-abdominal findings. No bowel obstruction.  Suspect ascites    < from: CT Abdomen and Pelvis w/ Oral Cont and w/ IV Cont (10.15.20 @ 20:34) >  No bowel obstruction.  Moderate ascites, minimally increased.  < from: US Abdomen Limited (10.18.20 @ 16:34) >  IMPRESSION:    Moderate ascites.    < end of copied text >  < from: CT Abdomen and Pelvis w/ Oral Cont and w/ IV Cont (10.15.20 @ 20:34) >  FINDINGS:  LOWER CHEST: Right lower lobe nodular opacities, possibly infectious or inflammatory. Left pleural plaque. Small left pleural effusion.    LIVER: Subcapsular fluid along the left hepatic lobe. 6 mm hepatic implant near the falciform ligament.  BILE DUCTS: Normal caliber.  GALLBLADDER: Within normal limits.  SPLEEN: Within normal limits.  PANCREAS: Within normal limits.  ADRENALS: Mild thickening.  KIDNEYS/URETERS: Subcentimeter renal hypodensities, too small to further characterize. Left parapelvic renal cysts. No hydronephrosis.    BLADDER: Minimally distended.  REPRODUCTIVE ORGANS: Hysterectomy.    BOWEL: Gastric wall thickening. No bowel obstruction. Appendix is not definitively identified. Right lower quadrant soft tissue density inseparable from the cecum and small bowel.  PERITONEUM: Moderate peritonealfluid, minimally increased. 1.5 cm right flank soft tissue implant.  VESSELS: Atherosclerotic changes.  RETROPERITONEUM/LYMPH NODES: Prominent retroperitoneal lymph nodes. Prominent mesenteric lymph nodes.  ABDOMINAL WALL: Postsurgical changes.  BONES: Degenerative changes.    IMPRESSION:  No bowel obstruction.  Moderate ascites, minimally increased.    < end of copied text >    RADIOLOGY/EKG:  < from: Xray UGI Single Contrast Study (10.19.20 @ 11:53) >    IMPRESSION: decreased motility of the thoracic esophagus. There was passage of barium into the stomach. The patient then proceeded to vomit the barium could not proceed with the examination..    < end of copied text >    < from: CT Angio Chest PE Protocol w/ IV Cont (10.17.20 @ 14:37) >  No pulmonary embolus.  Patchy peribronchial vascular consolidations ofthe lung bases. The concurrent finding of a dilated and fluid-filled esophagus suggests that aspiration may be a likely etiology for this finding.    LABS: All Labs Reviewed:  10-21    140  |  107  |  13  ----------------------------<  118<H>  3.6   |  28  |  0.78    Ca    8.7      21 Oct 2020 10:45    TPro  5.9<L>  /  Alb  2.3<L>  /  TBili  0.5  /  DBili  x   /  AST  18  /  ALT  9<L>  /  AlkPhos  71  10-21                         9.0    6.28  )-----------( 79       ( 21 Oct 2020 10:45 )             27.3       LIVER FUNCTIONS - ( 21 Oct 2020 10:45 )  Alb: 2.3 g/dL / Pro: 5.9 gm/dL / ALK PHOS: 71 U/L / ALT: 9 U/L / AST: 18 U/L / GGT: x             10-20    137  |  102  |  13  ----------------------------<  109<H>  3.8   |  29  |  0.76    Ca    8.7      20 Oct 2020 07:09  Mg     2.0     10-19    TPro  5.9<L>  /  Alb  2.2<L>  /  TBili  0.3  /  DBili  0.1  /  AST  15  /  ALT  9<L>  /  AlkPhos  64  10-20                            9.1    4.74  )-----------( 85       ( 20 Oct 2020 07:09 )             27.7             LIVER FUNCTIONS - ( 20 Oct 2020 07:09 )  Alb: 2.2 g/dL / Pro: 5.9 gm/dL / ALK PHOS: 64 U/L / ALT: 9 U/L / AST: 15 U/L / GGT: x                                   9.5    6.13  )-----------( 99       ( 18 Oct 2020 08:12 )             29.4     10-18    135  |  99  |  14  ----------------------------<  91  3.9   |  29  |  0.72    MEDICATIONS  (STANDING):  bisacodyl 5 milliGRAM(s) Oral at bedtime  enoxaparin Injectable 90 milliGRAM(s) SubCutaneous daily  ergocalciferol 35694 Unit(s) Oral <User Schedule>  famotidine Injectable 20 milliGRAM(s) IV Push every 12 hours  FIRST- Mouthwash  BLM 10 milliLiter(s) Swish and Spit two times a day  guaiFENesin   Syrup  (Sugar-Free) 200 milliGRAM(s) Oral every 6 hours  influenza   Vaccine 0.5 milliLiter(s) IntraMuscular once  melatonin 3 milliGRAM(s) Oral at bedtime  metoclopramide Injectable 10 milliGRAM(s) IV Push three times a day  mirtazapine 7.5 milliGRAM(s) Oral at bedtime  multivitamin/minerals 1 Tablet(s) Oral daily  pantoprazole  Injectable 40 milliGRAM(s) IV Push daily  piperacillin/tazobactam IVPB.. 3.375 Gram(s) IV Intermittent every 8 hours

## 2020-10-22 NOTE — PROVIDER CONTACT NOTE (OTHER) - SITUATION
spoke with 
Notified Dr. Aragon about patient complaining of abdominal discomfort. Pt has a fluid-filled esophagus and is having difficulty tolerate PO medications due to pain after taking them.
Notified Dr. Aragon of patient complaining of an increase in sputum.
Notified Dr. Aragon that patient is experiencing gastric reflux. This has been an issue for her for a few days, unable to tolerate PO well and unable to do x-ray small bowel series.
Notified Dr. Aragon that patient is requesting Benadryl. Pt has been experiencing nausea and vomiting which has been affecting her sleep.
Notified Dr. Danica Hayes about patient experiencing gastric reflux.
Patient is complaining of pain and requesting pain medication.
spoke with Audrey DIAZ) regarding consult
Home

## 2020-10-22 NOTE — CONSULT NOTE ADULT - ASSESSMENT
65y old Female coming from home with hx of  ovarian CA (Dx January 2020, w/ recurrent omental mets, peritoneal ca), 3rd admission this year, last in February for bowel obstruction. Pt now admitted on 10/12 to start new inpt chemo as sent in by her oncologist, Dr Fish, also complaining of inc abd pain and LE swelling. Of note, She was recently at [facility] and had paracentesis w/ 2.4L drained. Found to have GI dysmotility, likely effected by mets, also with ascites, and issues with swallowing here. Course further c/b PNA. Palliative Care consulted to assist with establishing GOC and for help with sx management.     1) Pain  - nociceptive pain 2/2 to progressive ovarian ca with known omental mets  - on fentanyl patch 12mcg, c/w this  - added few doses of IVT Tylenol PRN all levels of pain since this is effective thus far  - also added small dose of IV morphine 1mg q4h prn for breakthrough pain  - will continue to monitor and adjust accordingly    2) Esophageal Dysmotility leading to N/V  - imaging and endoscopy appreciated showing esophageal dysmotility that is likely caused by underlying progressive cancer  - GI notes appreciated  - Dr. Altamirano had conversation with pt and  informing them that the risks of PEG outweigh the benefits and recommending hospice  - pt remains on IV Reglan ATC  - c/w ativan 0.5 mg IV prn q6h for breakthrough nausea  - also c/w octreotide subq as ordered    3) Recurrent Ovarian Ca with mets  - dx in January, follows with Dr. Fish  - onc notes appreciated   - recently on treatment but did not improve with this, making it "unclear if she will have meaningful benefit from further therapy"  - guarded to poor prognosis documented  - now opting for hospice    4) Hypoxic respiratory failure 2/2 to PNA  - CTA done ruling out PE  - on supplemental O2 for dyspnea  - IV abx for PNA  - IV morphine can also be given for air hunger if needed    5) Stroke   - CTH showing lacunar infarcts  - no neurological compromise    6) Debility  - PPS<40% and waning at home 2/2 to sx  - nutrition notes appreciated- severe protein calorie malnutrition noted  - PT notes appreciated- home with PT recommended, but new plan emerged since rec made    7) Prognosis  - poor  - in setting of recurrent metastatic ovarian cancer with significant complications including interruption of normal bowel transit, resulting sx dependent on IV sx management, cerebral infarct, respiratory compromise, severe protein calorie malnutrition without option of PEG feeding, PPS<40%, albumin 2.3, pt fits criteria for hospice. Inpt hospice remains our recommendation given sx management needs.     8) GOC/Advanced Directives  - pt has capacity for decision making  - her  is serving as her surrogate decision maker as needed: Navdeep Escobar [phone]  [facility], [facility], D[facility], send, trial of feeding tube (none now bc not being offered), trial of IVF, determine use of abx  - GOC meeting held today- VNS inpt hospice referral already sent. Spoke with Cuong (NP at [facility]) clarifying appropriateness for inpt as pt remains on IV sx management for nausea and pain since oral intake exacerbates n/v, pt accepted but no bed today. Floor SW and RN aware. See abovementioned GOC portion of note for further details.   Thank you for including us in Mrs. Escobar's care. Will continue to follow with you.    Alyx Estrada MD  Palliative Care Attending

## 2020-10-22 NOTE — CONSULT NOTE ADULT - TREATMENT GUIDELINE COMMENT
full comfort measures as outlined above    * Spent 50 minutes discussing GOC with family including Advance care planning, explanation and discussion of advance directives, reviewed all treatment/dispo options, and MOLST.

## 2020-10-22 NOTE — PROVIDER CONTACT NOTE (OTHER) - ACTION/TREATMENT ORDERED:
OK to give 600mg of mucinex as per Dr. Aragon, will continue to monitor patient.
OK to give IV Tylenol as per Dr. Aragon.
OK to give IV protonix as per Dr. Aragon, will continue to monitor patient.
OK to give one time dose of maalox. Will continue to monitor patient.
OK to give 5mg of Oxycodone for pain. Will continue to monitor patient.
OK to give Benadryl as per Dr. Aragon.

## 2020-10-22 NOTE — PROGRESS NOTE ADULT - NUTRITIONAL ASSESSMENT
This patient has been assessed with a concern for Malnutrition and has been determined to have a diagnosis/diagnoses of Severe protein-calorie malnutrition.    This patient is being managed with:   Diet Full Liquid-  Supplement Feeding Modality:  Oral  Glucerna Shake Cans or Servings Per Day:  3       Frequency:  Three Times a day  Entered: Oct 19 2020  5:01PM    
This patient has been assessed with a concern for Malnutrition and has been determined to have a diagnosis/diagnoses of Severe protein-calorie malnutrition.    This patient is being managed with:   Diet NPO after Midnight-     NPO Start Date: 18-Oct-2020   NPO Start Time: 23:59  Entered: Oct 18 2020  7:29PM    Diet Soft-  Entered: Oct 18 2020  3:09PM    
This patient has been assessed with a concern for Malnutrition and has been determined to have a diagnosis/diagnoses of Severe protein-calorie malnutrition.    This patient is being managed with:   Diet, Regular (10-12-20 @ 20:27) [Active]  Add gelatein bid  Pt rejects Ensure  New weight,   weekly weights  add MVI w minerals
This patient has been assessed with a concern for Malnutrition and has been determined to have a diagnosis/diagnoses of Severe protein-calorie malnutrition.    This patient is being managed with:   Diet Full Liquid-  Supplement Feeding Modality:  Oral  Glucerna Shake Cans or Servings Per Day:  3       Frequency:  Three Times a day  Entered: Oct 19 2020  5:01PM    
This patient has been assessed with a concern for Malnutrition and has been determined to have a diagnosis/diagnoses of Severe protein-calorie malnutrition.    This patient is being managed with:   Diet Full Liquid-  Supplement Feeding Modality:  Oral  Glucerna Shake Cans or Servings Per Day:  3       Frequency:  Three Times a day  Entered: Oct 19 2020  5:01PM    
This patient has been assessed with a concern for Malnutrition and has been determined to have a diagnosis/diagnoses of Severe protein-calorie malnutrition.    This patient is being managed with:   Diet Mechanical Soft-  Supplement Feeding Modality:  Oral  Glucerna Shake Cans or Servings Per Day:  3       Frequency:  Three Times a day  Entered: Oct 20 2020  5:06PM    
This patient has been assessed with a concern for Malnutrition and has been determined to have a diagnosis/diagnoses of Severe protein-calorie malnutrition.    This patient is being managed with:   Diet NPO-  Except Medications  With Ice Chips/Sips of Water  Entered: Oct 17 2020  7:33PM    
This patient has been assessed with a concern for Malnutrition and has been determined to have a diagnosis/diagnoses of Severe protein-calorie malnutrition.    This patient is being managed with:   Diet Regular-     Qty per Day:  bid  Entered: Oct 13 2020  9:31AM    
This patient has been assessed with a concern for Malnutrition and has been determined to have a diagnosis/diagnoses of Severe protein-calorie malnutrition.    This patient is being managed with:   Diet Regular-     Qty per Day:  bid  Entered: Oct 13 2020  9:31AM    
This patient has been assessed with a concern for Malnutrition and has been determined to have a diagnosis/diagnoses of Severe protein-calorie malnutrition.    This patient is being managed with:   Diet Regular-  Prosource Gelatein Plus     Qty per Day:  TID  Entered: Oct 16 2020 10:54AM    
This patient has been assessed with a concern for Malnutrition and has been determined to have a diagnosis/diagnoses of Severe protein-calorie malnutrition.    This patient is being managed with:   Diet Regular-  Prosource Gelatein Plus     Qty per Day:  TID  Entered: Oct 16 2020 10:54AM    
This patient has been assessed with a concern for Malnutrition and has been determined to have a diagnosis/diagnoses of Severe protein-calorie malnutrition.    This patient is being managed with:   Diet Soft-  Entered: Oct 18 2020  3:09PM    
This patient has been assessed with a concern for Malnutrition and has been determined to have a diagnosis/diagnoses of Severe protein-calorie malnutrition.    This patient is being managed with:   Diet Mechanical Soft-  Supplement Feeding Modality:  Oral  Glucerna Shake Cans or Servings Per Day:  3       Frequency:  Three Times a day  Entered: Oct 20 2020  5:06PM    
This patient has been assessed with a concern for Malnutrition and has been determined to have a diagnosis/diagnoses of Severe protein-calorie malnutrition.    This patient is being managed with:   Diet Regular-     Qty per Day:  bid  Entered: Oct 13 2020  9:31AM    
This patient has been assessed with a concern for Malnutrition and has been determined to have a diagnosis/diagnoses of Severe protein-calorie malnutrition.    This patient is being managed with:   Diet Regular-  Prosource Gelatein Plus     Qty per Day:  TID  Entered: Oct 16 2020 10:54AM    
This patient has been assessed with a concern for Malnutrition and has been determined to have a diagnosis/diagnoses of Severe protein-calorie malnutrition.    This patient is being managed with:   Diet Regular-  Prosource Gelatein Plus     Qty per Day:  TID  Entered: Oct 16 2020 10:54AM    
This patient has been assessed with a concern for Malnutrition and has been determined to have a diagnosis/diagnoses of Severe protein-calorie malnutrition.    This patient is being managed with:   Diet Regular-     Qty per Day:  bid  Entered: Oct 13 2020  9:31AM

## 2020-10-22 NOTE — PROGRESS NOTE ADULT - SUBJECTIVE AND OBJECTIVE BOX
cc -nausea, vomiting , abdominal pain       65 female  w/PMH ovarian CA, omental mets, peritoneal ca, presents to ED on 10/12/20 to start new inpt chemo as sent in by her oncologist, Dr Fish.  She was recently at [facility] and had paracentesis w/ 2.4L drained.  She endorses diffuse abd pain that is constant for which she has fentanyl patch and takes tylenol.  Pain was 3/10 and currently 1/10 after taking tylenol.  She also endorses recent b/l LE swelling which she's never had before.  She is on lovenox 60mg Qdaily.  She denies any fever/chills, cp, sob, n/v/d, dysuria.  In ED, received 1L ivf.      SUBJECTIVE / OVERNIGHT EVENTS:  10/13: s/e at bedside, doing well, no new c/o or o/n events.  pain controlled.   10/14 - no cp palps sob or abdo pain; last BM yesterday - passing gas   10/15 - no cp palps sob; one episode of vomiting this morning; had lunch   10/16 - persistent nausea, vomited again this morning   10/17 - no cp palps sob; some cough, hypoxia, got CT chest   10/18 - no cp palps sob; nausea, lot of regurg  10/19 - pt seen and examined, + nausea, vomiting, poor po intake  10/20 - pt seen and examined, + nausea, + vomiting, + epigastric pain, afebrile, POC discussed   10/21 - pt seen and examined, + secretions, + cough, + nausea, denies cp, + some abdominal discomfort, POC discussed   10/22 -  pt seen and examined, feels better, less secretions, denies cp, + abdominal discomfort    Review of system- Rest of the review of system are negative except mentioned in HPI    T(C): 36.6 (10-22-20 @ 15:04), Max: 36.7 (10-22-20 @ 08:34)  T(F): 97.8 (10-22-20 @ 15:04), Max: 98.1 (10-22-20 @ 08:34)  HR: 76 (10-22-20 @ 15:04) (71 - 89)  BP: 129/79 (10-22-20 @ 15:04) (129/68 - 137/78)  RR: 16 (10-22-20 @ 15:04) (16 - 18)  SpO2: 97% (10-22-20 @ 15:04) (97% - 97%)  Wt(kg): --      GENERAL: NAD, well-developed  HEAD:  Atraumatic, Normocephalic  EYES: EOMI, PERRLA, conjunctiva and sclera clear  ENT: normal hearing, no nasal discharge, throat clear, dentition normal, +THRUSH  NECK: Supple, No JVD, no LAD, no thyromegaly   CHEST/LUNG: Clear to auscultation bilaterally; No wheeze, respirations unlabored  HEART: Regular rate and rhythm; No murmurs, rubs, or gallops  ABDOMEN: Soft, +DIFFUSE TTP, MILDLY distended; Bowel sounds present,  EXTREMITIES:  2+ Peripheral Pulses, No clubbing, cyanosis, ++ LE nonpitting b/l edema  NEUROLOGY: non-focal, sensory and cn 2-12 intact, speech/language intact    RADIOLOGY/EKG:  < from: 12 Lead ECG (10.12.20 @ 16:18) >    Ventricular Rate 84 BPM    Atrial Rate 84 BPM    P-R Interval 118 ms    QRS Duration 142 ms    Q-T Interval 394 ms    QTC Calculation(Bazett) 465 ms    P Axis 26 degrees    R Axis -65 degrees    T Axis 93 degrees    Diagnosis Line Normal sinus rhythm  Left axis deviation  Left bundle branch block  Abnormal ECG  When compared with ECG of 06-FEB-2020 07:18,  No significant change was found    < end of copied text >  < from: Upper Endoscopy (10.21.20 @ 07:13) >  FINDING Findings:    FINDING      Fluid was found in the entire esophagus.    FINDING      A small hiatal hernia was present.    FINDING      The examined duodenum was normal.    FINDING      The lumen of the esophagus was moderately dilated.    FINDING      Dilated fluid and debris filled esophagus without sign of obstruction    COMPLIC Complications:       No immediate complications.    IMPRESS Impression:          - Fluid in the esophagus.    IMPRESS                      - Small hiatal hernia.    IMPRESS                      - Normal examined duodenum.    IMPRESS                      - Dilation in the entire esophagus.    IMPRESS                      - No specimens collected.      < end of copied text >    < from: MR Head w/wo IV Cont (10.20.20 @ 15:50) >  No acute infarct or intracranial hemorrhage is identified.    There is afocus of mild enhancement involving the left luis with associated T2/FLAIR hyperintensity (19-8). There is no associated reduced diffusion or susceptibility related signal loss.    Chronic lacunar infarcts are noted involving the midbrain and scattered FLAIR hyperintense white matter foci are noted, especially within the luis.    Developmental venous anomalies noted in the right temporal-occipital and left parietal regions.    No hydrocephalus. No extra-axial fluid collections. The skull base flow voids are present.    The visualized intraorbital contents are normal. The imaged portions of the paranasal sinuses are clear. The mastoid air cells are clear.    IMPRESSION:    Focus of mild enhancement in the left luis most compatible with subacute infarct in the setting of multiple chronic brainstem lacunar infarcts and microvascular ischemic changes. Nonetheless, given cancer history, a follow-up in 4-6 weeks is recommended to ensure resolution.    < end of copied text >  < from: US Duplex Venous Lower Ext Complete, Bilateral (10.13.20 @ 09:33) >  No evidence of deep venous thrombosis in either lower extremity.  < from: Xray Abdomen 2 Views (10.14.20 @ 14:16) >  No acute intra-abdominal findings. No bowel obstruction.  Suspect ascites    < from: CT Abdomen and Pelvis w/ Oral Cont and w/ IV Cont (10.15.20 @ 20:34) >  No bowel obstruction.  Moderate ascites, minimally increased.  < from: US Abdomen Limited (10.18.20 @ 16:34) >  IMPRESSION:    Moderate ascites.    < end of copied text >  < from: CT Abdomen and Pelvis w/ Oral Cont and w/ IV Cont (10.15.20 @ 20:34) >  FINDINGS:  LOWER CHEST: Right lower lobe nodular opacities, possibly infectious or inflammatory. Left pleural plaque. Small left pleural effusion.    LIVER: Subcapsular fluid along the left hepatic lobe. 6 mm hepatic implant near the falciform ligament.  BILE DUCTS: Normal caliber.  GALLBLADDER: Within normal limits.  SPLEEN: Within normal limits.  PANCREAS: Within normal limits.  ADRENALS: Mild thickening.  KIDNEYS/URETERS: Subcentimeter renal hypodensities, too small to further characterize. Left parapelvic renal cysts. No hydronephrosis.    BLADDER: Minimally distended.  REPRODUCTIVE ORGANS: Hysterectomy.    BOWEL: Gastric wall thickening. No bowel obstruction. Appendix is not definitively identified. Right lower quadrant soft tissue density inseparable from the cecum and small bowel.  PERITONEUM: Moderate peritonealfluid, minimally increased. 1.5 cm right flank soft tissue implant.  VESSELS: Atherosclerotic changes.  RETROPERITONEUM/LYMPH NODES: Prominent retroperitoneal lymph nodes. Prominent mesenteric lymph nodes.  ABDOMINAL WALL: Postsurgical changes.  BONES: Degenerative changes.    IMPRESSION:  No bowel obstruction.  Moderate ascites, minimally increased.    < end of copied text >    RADIOLOGY/EKG:  < from: Xray UGI Single Contrast Study (10.19.20 @ 11:53) >    IMPRESSION: decreased motility of the thoracic esophagus. There was passage of barium into the stomach. The patient then proceeded to vomit the barium could not proceed with the examination..    < end of copied text >    < from: CT Angio Chest PE Protocol w/ IV Cont (10.17.20 @ 14:37) >  No pulmonary embolus.  Patchy peribronchial vascular consolidations ofthe lung bases. The concurrent finding of a dilated and fluid-filled esophagus suggests that aspiration may be a likely etiology for this finding.    LABS: All Labs Reviewed:  10-22    138  |  106  |  12  ----------------------------<  118<H>  3.8   |  25  |  0.78    Ca    8.8      22 Oct 2020 06:54    TPro  6.0  /  Alb  2.1<L>  /  TBili  0.7  /  DBili  x   /  AST  41<H>  /  ALT  20  /  AlkPhos  211<H>  10-22                        9.1    5.93  )-----------( 62       ( 22 Oct 2020 06:54 )             28.5     LIVER FUNCTIONS - ( 22 Oct 2020 06:54 )  Alb: 2.1 g/dL / Pro: 6.0 gm/dL / ALK PHOS: 211 U/L / ALT: 20 U/L / AST: 41 U/L / GGT: x                   10-21    140  |  107  |  13  ----------------------------<  118<H>  3.6   |  28  |  0.78    Ca    8.7      21 Oct 2020 10:45    TPro  5.9<L>  /  Alb  2.3<L>  /  TBili  0.5  /  DBili  x   /  AST  18  /  ALT  9<L>  /  AlkPhos  71  10-21                         9.0    6.28  )-----------( 79       ( 21 Oct 2020 10:45 )             27.3       LIVER FUNCTIONS - ( 21 Oct 2020 10:45 )  Alb: 2.3 g/dL / Pro: 5.9 gm/dL / ALK PHOS: 71 U/L / ALT: 9 U/L / AST: 18 U/L / GGT: x             10-20    137  |  102  |  13  ----------------------------<  109<H>  3.8   |  29  |  0.76    Ca    8.7      20 Oct 2020 07:09  Mg     2.0     10-19    TPro  5.9<L>  /  Alb  2.2<L>  /  TBili  0.3  /  DBili  0.1  /  AST  15  /  ALT  9<L>  /  AlkPhos  64  10-20                            9.1    4.74  )-----------( 85       ( 20 Oct 2020 07:09 )             27.7             LIVER FUNCTIONS - ( 20 Oct 2020 07:09 )  Alb: 2.2 g/dL / Pro: 5.9 gm/dL / ALK PHOS: 64 U/L / ALT: 9 U/L / AST: 15 U/L / GGT: x                                   9.5    6.13  )-----------( 99       ( 18 Oct 2020 08:12 )             29.4     10-18    135  |  99  |  14  ----------------------------<  91  3.9   |  29  |  0.72    MEDICATIONS  (STANDING):  bisacodyl 5 milliGRAM(s) Oral at bedtime  enoxaparin Injectable 90 milliGRAM(s) SubCutaneous daily  ergocalciferol 28610 Unit(s) Oral <User Schedule>  famotidine Injectable 20 milliGRAM(s) IV Push every 12 hours  FIRST- Mouthwash  BLM 10 milliLiter(s) Swish and Spit two times a day  guaiFENesin   Syrup  (Sugar-Free) 200 milliGRAM(s) Oral every 6 hours  influenza   Vaccine 0.5 milliLiter(s) IntraMuscular once  melatonin 3 milliGRAM(s) Oral at bedtime  metoclopramide Injectable 10 milliGRAM(s) IV Push three times a day  mirtazapine 7.5 milliGRAM(s) Oral at bedtime  multivitamin/minerals 1 Tablet(s) Oral daily  pantoprazole  Injectable 40 milliGRAM(s) IV Push daily  piperacillin/tazobactam IVPB.. 3.375 Gram(s) IV Intermittent every 8 hours

## 2020-10-22 NOTE — CONSULT NOTE ADULT - CONVERSATION DETAILS
Met with Mrs. Escobar and her  Navdeep once he arrived to discuss medical issues and their overall hopes for the pt's care. They explained that they have been  for 39 years, living with each other, having great friends who are supporting them through this. Prior to this admission the pt was able to ambulate on her own, though since last few months the pt has had a hospital bed in the living room because of decreased mobility and inability to lay flat in their bed. She was able to manage her ADLs, though her  notes having to help more in the past few weeks. They also endorse that these swallowing issues have made eating an uncomfortable chore and unfortunately have also negatively impacted the pt's energy to move.     They both understand what is happening to Mrs. Escobar's body. Mr. Escobar offered that he felt a little confused about what was happening and their true plan yesterday. However, today he has had the chance to speak to Dr. Altamirano, Dr. Kennedy, and his sister who have helped to put things together for them. They know that the pt's cancer recurred and is effecting her whole body. They know that this is the likely reason for her esophageal dysmotility and her increased susceptibility to infections like PNA. They recall speaking with those clinicians, all of whom sharing concerns about the pt's dwindling QOL and that further aggressive interventions such as PEG or more cancer treatment would likely be more risk than benefit- and as a result, are no longer being offered.     The pt and her  were tearful at times, him more so than her when trying to share how he felt. The pt was visibly gathering her strength, holding her 's hands, and bravely noting her acceptance that he end may be near. She only expressed one worry and that was for her . He felt the same way in return. They both agreed that hospice would be the best place for them to start to focus on comfort, knowing they could be together. Mr. Escobar added that the reason why his talk with his sister was so helpful was because her  was cared for at [facility] hospice and she raved about their compassion, knowing it would be a great place for them. We supported this perspective- taking time to review all levels of hospice care, including the reason why in hospice was the more appropriate level of care given pt's dependence on IV sx management, but also noting that we keep home as an option if ever sx can be fully controlled at hospice (which happens in 30% of pts which they did not know). However, they were resolved to the possibility that the pt may not make it home, tearfully acknowledging this, but appreciating the support for hope for home.     Ultimately, both the pt and her  understand how Mrs. Alicias cancer has effected her body and her life. They understand that further aggressive measures will not add quality to their life, agreeing that at this point the very least that they deserve is comfort and compassionate care. They are increasingly confident that they will get that care at hospice. They agree with comfort measures here until accepted and a bed is available there including: no further blood draws, no imaging, qshift vitals, and focus on keeping the pt comfortable while we have the privilege of caring for her. Above d/w floor RN, Hospice NP Cuong, and floor RN Juanjose. Will continue to follow.

## 2020-10-22 NOTE — PROGRESS NOTE ADULT - SUBJECTIVE AND OBJECTIVE BOX
Patient is a 65y old  Female who presents with a chief complaint of chemo (21 Oct 2020 17:58)      HPI:  she is feeling ok and resting   we had long conversation regarding potential risks and benefits of providing nutrition via feeding tube vs tpn  I stated that potential risks outweigh potential benefits  She expresses understanding  I also spoke to her  on the phone and he agrees and has requested [facility] hosp[facility] referral    PAST MEDICAL & SURGICAL HISTORY:  Ovarian cancer    Constipation    Peritoneal carcinoma    Omental metastasis    No significant past surgical history        MEDICATIONS  (STANDING):  acetaminophen  IVPB .. 1000 milliGRAM(s) IV Intermittent once  bisacodyl 5 milliGRAM(s) Oral at bedtime  enoxaparin Injectable 90 milliGRAM(s) SubCutaneous daily  ergocalciferol 71694 Unit(s) Oral <User Schedule>  famotidine Injectable 20 milliGRAM(s) IV Push at bedtime  fentaNYL   Patch  12 MICROgram(s)/Hr 1 Patch Transdermal every 72 hours  FIRST- Mouthwash  BLM 10 milliLiter(s) Swish and Spit two times a day  guaiFENesin   Syrup  (Sugar-Free) 200 milliGRAM(s) Oral every 6 hours  influenza   Vaccine 0.5 milliLiter(s) IntraMuscular once  melatonin 3 milliGRAM(s) Oral at bedtime  metoclopramide Injectable 10 milliGRAM(s) IV Push three times a day  mirtazapine 7.5 milliGRAM(s) Oral at bedtime  multivitamin/minerals 1 Tablet(s) Oral daily  octreotide  Injectable 100 MICROGram(s) SubCutaneous every 8 hours  pantoprazole  Injectable 40 milliGRAM(s) IV Push daily  piperacillin/tazobactam IVPB.. 3.375 Gram(s) IV Intermittent every 8 hours    MEDICATIONS  (PRN):  acetaminophen   Tablet .. 650 milliGRAM(s) Oral every 4 hours PRN Mild Pain (1 - 3)  aluminum hydroxide/magnesium hydroxide/simethicone Suspension 30 milliLiter(s) Oral every 4 hours PRN Dyspepsia  LORazepam   Injectable 0.5 milliGRAM(s) IV Push every 6 hours PRN for nausea if reglan not effective  polyethylene glycol 3350 17 Gram(s) Oral daily PRN if no BM for 2 days  simethicone 80 milliGRAM(s) Chew three times a day PRN Indigestion      Allergies    codeine (Unknown)  mineral oil (Rash)  shellfish (Unknown)    Intolerances        REVIEW OF SYSTEMS:  weakness  continued emesis    Vital Signs Last 24 Hrs  T(C): 36.7 (22 Oct 2020 08:34), Max: 36.7 (22 Oct 2020 08:34)  T(F): 98.1 (22 Oct 2020 08:34), Max: 98.1 (22 Oct 2020 08:34)  HR: 71 (22 Oct 2020 08:34) (71 - 90)  BP: 137/78 (22 Oct 2020 08:34) (129/68 - 137/78)  BP(mean): --  RR: 18 (22 Oct 2020 08:34) (16 - 18)  SpO2: 97% (22 Oct 2020 08:34) (94% - 97%)    PHYSICAL EXAM:    Constitutional: appears chronically ill  Gastrointestinal: distended      LABS:                        9.1    5.93  )-----------( 62       ( 22 Oct 2020 06:54 )             28.5     10-22    138  |  106  |  12  ----------------------------<  118<H>  3.8   |  25  |  0.78    Ca    8.8      22 Oct 2020 06:54    TPro  6.0  /  Alb  2.1<L>  /  TBili  0.7  /  DBili  x   /  AST  41<H>  /  ALT  20  /  AlkPhos  211<H>  10-22      LIVER FUNCTIONS - ( 22 Oct 2020 06:54 )  Alb: 2.1 g/dL / Pro: 6.0 gm/dL / ALK PHOS: 211 U/L / ALT: 20 U/L / AST: 41 U/L / GGT: x             RADIOLOGY & ADDITIONAL STUDIES:

## 2020-10-22 NOTE — PROGRESS NOTE ADULT - ASSESSMENT
64yo female with advanced ovarian cancer with functional obstruction of esophagus and small bowel   I recommend hospice and she and her  are in agreement  d/w dr sanches

## 2020-10-22 NOTE — CONSULT NOTE ADULT - SUBJECTIVE AND OBJECTIVE BOX
HPI: Pt is a 65y old Female with hx of       PAIN: ( )Yes   ( )No  Level:  Location:  Intensity:    /10  Quality:  Aggravating Factors:  Alleviating Factors:  Radiation:  Duration/Timing:  Impact on ADLs:    DYSPNEA: ( ) Yes  ( ) No  Level:    PAST MEDICAL & SURGICAL HISTORY:  Ovarian cancer    Constipation    Peritoneal carcinoma    Omental metastasis    No significant past surgical history        SOCIAL HX:    Hx opiate tolerance ( )YES  ( )NO    Baseline ADLs  (Prior to Admission)  ( ) Independent   ( )Dependent    FAMILY HISTORY:  FH: heart attack        Review of Systems:    Anxiety- denies  Depression- yes, but denies SI or HI  Constipation- no, BM today, but black  Diarrhea- no  Nausea- yes  Vomiting- yes  Anorexia- yes  Weight Loss- yes   Cough- no  Secretions- yes  Fatigue- yes  Weakness- yes  Delirium- no    All other systems reviewed and negative      PHYSICAL EXAM:    Vital Signs Last 24 Hrs  T(C): 36.7 (22 Oct 2020 08:34), Max: 36.7 (22 Oct 2020 08:34)  T(F): 98.1 (22 Oct 2020 08:34), Max: 98.1 (22 Oct 2020 08:34)  HR: 71 (22 Oct 2020 08:34) (71 - 90)  BP: 137/78 (22 Oct 2020 08:34) (129/68 - 137/78)  BP(mean): --  RR: 18 (22 Oct 2020 08:34) (16 - 18)  SpO2: 97% (22 Oct 2020 08:34) (94% - 97%)  Daily     Daily     PPSV2: 30-40  %    General: Middle aged female, sitting up in bed, pleasant, NAD  Mental Status: AOx4  HEENT: mmm, + temporal wasting  Lungs: dec at bases bl  Cardiac: + s1 s2 rrr  GI: soft, mod distention, ttp lower quadrant, + bs  : voids  MSK/skin: moves all extremities, some bl le edema, muscle/fat wasting  Neuro: no focal deficite      LABS:                        9.1    5.93  )-----------( 62       ( 22 Oct 2020 06:54 )             28.5     10-22    138  |  106  |  12  ----------------------------<  118<H>  3.8   |  25  |  0.78    Ca    8.8      22 Oct 2020 06:54    TPro  6.0  /  Alb  2.1<L>  /  TBili  0.7  /  DBili  x   /  AST  41<H>  /  ALT  20  /  AlkPhos  211<H>  10-22      Albumin: Albumin, Serum: 2.1 g/dL (10-22 @ 06:54)      Allergies    codeine (Unknown)  mineral oil (Rash)  shellfish (Unknown)    Intolerances      MEDICATIONS  (STANDING):  bisacodyl 5 milliGRAM(s) Oral at bedtime  ergocalciferol 13531 Unit(s) Oral <User Schedule>  famotidine Injectable 20 milliGRAM(s) IV Push at bedtime  fentaNYL   Patch  12 MICROgram(s)/Hr 1 Patch Transdermal every 72 hours  FIRST- Mouthwash  BLM 10 milliLiter(s) Swish and Spit two times a day  guaiFENesin   Syrup  (Sugar-Free) 200 milliGRAM(s) Oral every 6 hours  influenza   Vaccine 0.5 milliLiter(s) IntraMuscular once  melatonin 3 milliGRAM(s) Oral at bedtime  metoclopramide Injectable 10 milliGRAM(s) IV Push three times a day  mirtazapine 7.5 milliGRAM(s) Oral at bedtime  multivitamin/minerals 1 Tablet(s) Oral daily  octreotide  Injectable 100 MICROGram(s) SubCutaneous every 8 hours  pantoprazole  Injectable 40 milliGRAM(s) IV Push daily  piperacillin/tazobactam IVPB.. 3.375 Gram(s) IV Intermittent every 8 hours    MEDICATIONS  (PRN):  acetaminophen   Tablet .. 650 milliGRAM(s) Oral every 4 hours PRN Mild Pain (1 - 3)  aluminum hydroxide/magnesium hydroxide/simethicone Suspension 30 milliLiter(s) Oral every 4 hours PRN Dyspepsia  LORazepam   Injectable 0.5 milliGRAM(s) IV Push every 6 hours PRN for nausea if reglan not effective  polyethylene glycol 3350 17 Gram(s) Oral daily PRN if no BM for 2 days  simethicone 80 milliGRAM(s) Chew three times a day PRN Indigestion      RADIOLOGY/ADDITIONAL STUDIES:    EXAM:  MR BRAIN [facility]                        PROCEDURE DATE:  10/20/2020      IMPRESSION:    Focus of mild enhancement in the left luis most compatible with subacute infarct in the setting of multiple chronic brainstem lacunar infarcts and microvascular ischemic changes. Nonetheless, given cancer history, a follow-up in 4-6 weeks is recommended to ensure resolution.    MAGUI MCNULTY   This document hasbeen electronically signed. Oct 20 2020  4:10PM    EXAM:  XR UGI SNGL CON STDY                        PROCEDURE DATE:  10/19/2020      IMPRESSION: decreased motility of the thoracic esophagus. There was passage of barium into the stomach. The patient then proceeded to vomit the barium could not proceed with the examination..    GRISELDA MURDOCK M.D., ATTENDING RADIOLOGIST  This document has been electronically signed. Oct 19 2020  4:42PM    EXAM:  US ABDOMEN LIMITED                        PROCEDURE DATE:  10/18/2020      IMPRESSION:    Moderate ascites.    LOVELY BLEVINS M.D., ATTENDING RADIOLOGIST  This document has been electronically signed. Oct 18 2020  4:54PM        EXAM:  CTA CHEST PE PROTOCOL (W)AW IC                        PROCEDURE DATE:  10/17/2020      IMPRESSION:    No pulmonary embolus.    Patchy peribronchial vascular consolidations ofthe lung bases. The concurrent finding of a dilated and fluid-filled esophagus suggests that aspiration may be a likely etiology for this finding.      ALICIA WAN M.D., ATTENDING RADIOLOGIST  This document has been electronically signed. Oct 17 2020  2:54PM      EXAM:  CT ABDOMEN AND PELVIS OC IC                        PROCEDURE DATE:  10/15/2020      IMPRESSION:  No bowel obstruction.  Moderate ascites, minimally increased.    RACHEL SARAVIA M.D., ATTENDING RADIOLOGIST  This document has been electronically signed. Oct 15 2020  8:53PM      PATIENTNAME Patient Name: Nathalia Escobar    EXAMDATE Procedure Date: 10/21/2020 7:13 AM    ENDOPROCEDURENAME Procedure:           Upper GI endoscopy    IMPRESS Impression:          - Fluid in the esophagus.    IMPRESS                      - Small hiatal hernia.    IMPRESS                      - Normal examined duodenum.    IMPRESS                      - Dilation in the entire esophagus.    IMPRESS                      - No specimens collected.    ENDORECOMMENDATION Recommendation:      - The signs and symptoms of potential delayed     ENDORECOMMENDATION                      complications were discussed with the patient.    ENDORECOMMENDATION                      - Patient has a contact number available for emergencies.    ENDORECOMMENDATION          - Return to normal activities tomorrow.    ENDORECOMMENDATION                      - Resume previous diet.    ENDORECOMMENDATION                      - Continue present medications.    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 Escobar is a 65y old Female coming from home with hx of  ovarian CA (Dx January 2020, w/ recurrent omental mets, peritoneal ca), 3rd admission this year, last in February for bowel obstruction. Pt now admitted on 10/12 to start new inpt chemo as sent in by her oncologist, Dr Fish, also complaining of inc abd pain and LE swelling. Of note, She was recently at [facility] and had paracentesis w/ 2.4L drained. Found to have GI dysmotility, likely effected by mets, also with ascites, and issues with swallowing here. Course further c/b PNA. Palliative Care consulted to assist with establishing GOC and for help with sx management.     Met Mrs. Escobar this afternoon. Explained role of our team in her care and she was open to conversing. She endorsed 2/10 lower abdominal pain, achy, usually between 2-4/10 in intensity, which is acceptable for her. She says IV Tylenol resolves pain but only lasts about 4-5 hrs. Explained the dosing for IV Tylenol and safety. Also inquired if pt had ever had morphine before, which she endorsed, saying it made her sleepy, though unsure of dose at that time. Nonetheless, she was open to small dose being ordered for breakthrough if her pain increased between doses of IV Tylenol. She also endorsed ROSAS, denying this at rest. She noted nausea, ongoing since admission, though improved with ATC Reglan and octreotide- also noting IV ativan was helpful for this when given. She denied vomiting, but says nausea is always present, exacerbated by trying to eat anything more substantial than ice chips- which she just became able to tolerate. She denied constipation, though noting BM today that had black color to it, denying discomfort with this movement.     Mrs. Escobar was very forthcoming with her understanding of her disease, knowing more treatment for her cancer was not in her best interest. Likewise she affirmed talking with Dr. Altamirano today, agreeing with him that a PEG, given her intraabdominal issues would be greater risk than benefit. She also noted recommendations made for comfort focus and hospice, though she did want more clarification on this. Offered GOC conversation to her, knowing her  would be in soon after initial encounter started. She was open to this and thus, we returned later for said conversation. Please see below embedded GOC note for additional details and decisions.     PAIN: ( x)Yes   ( )No  Level: mild  Location: lower abdomen  Intensity:   2 /10  Quality: ache  Aggravating Factors: moving bowels, position  Alleviating Factors: pain meds  Radiation: no  Duration/Timing: intermittent  Impact on ADLs: yes    DYSPNEA: (x ) Yes  ( ) No  Level: on exertion    PAST MEDICAL & SURGICAL HISTORY:  Ovarian cancer    Constipation    Peritoneal carcinoma    Omental metastasis    No significant past surgical history        SOCIAL HX:    Hx opiate tolerance ( )YES  ( )NO    Baseline ADLs  (Prior to Admission)  ( ) Independent   ( )Dependent    FAMILY HISTORY:  FH: heart attack        Review of Systems:    Anxiety- denies  Depression- yes, but denies SI or HI  Constipation- no, BM today, but black  Diarrhea- no  Nausea- yes  Vomiting- yes  Anorexia- yes  Weight Loss- yes   Cough- no  Secretions- yes  Fatigue- yes  Weakness- yes  Delirium- no    All other systems reviewed and negative      PHYSICAL EXAM:    Vital Signs Last 24 Hrs  T(C): 36.7 (22 Oct 2020 08:34), Max: 36.7 (22 Oct 2020 08:34)  T(F): 98.1 (22 Oct 2020 08:34), Max: 98.1 (22 Oct 2020 08:34)  HR: 71 (22 Oct 2020 08:34) (71 - 90)  BP: 137/78 (22 Oct 2020 08:34) (129/68 - 137/78)  BP(mean): --  RR: 18 (22 Oct 2020 08:34) (16 - 18)  SpO2: 97% (22 Oct 2020 08:34) (94% - 97%)  Daily     Daily     PPSV2: 30-40  %    General: Middle aged female, sitting up in bed, pleasant, NAD  Mental Status: AOx4  HEENT: mmm, + temporal wasting  Lungs: dec at bases bl  Cardiac: + s1 s2 rrr  GI: soft, mod distention, ttp lower quadrant, + bs  : voids  MSK/skin: moves all extremities, some bl le edema, muscle/fat wasting  Neuro: no focal deficite      LABS:                        9.1    5.93  )-----------( 62       ( 22 Oct 2020 06:54 )             28.5     10-22    138  |  106  |  12  ----------------------------<  118<H>  3.8   |  25  |  0.78    Ca    8.8      22 Oct 2020 06:54    TPro  6.0  /  Alb  2.1<L>  /  TBili  0.7  /  DBili  x   /  AST  41<H>  /  ALT  20  /  AlkPhos  211<H>  10-22      Albumin: Albumin, Serum: 2.1 g/dL (10-22 @ 06:54)      Allergies    codeine (Unknown)  mineral oil (Rash)  shellfish (Unknown)    Intolerances      MEDICATIONS  (STANDING):  bisacodyl 5 milliGRAM(s) Oral at bedtime  ergocalciferol 66463 Unit(s) Oral <User Schedule>  famotidine Injectable 20 milliGRAM(s) IV Push at bedtime  fentaNYL   Patch  12 MICROgram(s)/Hr 1 Patch Transdermal every 72 hours  FIRST- Mouthwash  BLM 10 milliLiter(s) Swish and Spit two times a day  guaiFENesin   Syrup  (Sugar-Free) 200 milliGRAM(s) Oral every 6 hours  influenza   Vaccine 0.5 milliLiter(s) IntraMuscular once  melatonin 3 milliGRAM(s) Oral at bedtime  metoclopramide Injectable 10 milliGRAM(s) IV Push three times a day  mirtazapine 7.5 milliGRAM(s) Oral at bedtime  multivitamin/minerals 1 Tablet(s) Oral daily  octreotide  Injectable 100 MICROGram(s) SubCutaneous every 8 hours  pantoprazole  Injectable 40 milliGRAM(s) IV Push daily  piperacillin/tazobactam IVPB.. 3.375 Gram(s) IV Intermittent every 8 hours    MEDICATIONS  (PRN):  acetaminophen   Tablet .. 650 milliGRAM(s) Oral every 4 hours PRN Mild Pain (1 - 3)  aluminum hydroxide/magnesium hydroxide/simethicone Suspension 30 milliLiter(s) Oral every 4 hours PRN Dyspepsia  LORazepam   Injectable 0.5 milliGRAM(s) IV Push every 6 hours PRN for nausea if reglan not effective  polyethylene glycol 3350 17 Gram(s) Oral daily PRN if no BM for 2 days  simethicone 80 milliGRAM(s) Chew three times a day PRN Indigestion      RADIOLOGY/ADDITIONAL STUDIES:    EXAM:  MR BRAIN [facility]                        PROCEDURE DATE:  10/20/2020      IMPRESSION:    Focus of mild enhancement in the left luis most compatible with subacute infarct in the setting of multiple chronic brainstem lacunar infarcts and microvascular ischemic changes. Nonetheless, given cancer history, a follow-up in 4-6 weeks is recommended to ensure resolution.    MAGUI MCNULTY   This document hasbeen electronically signed. Oct 20 2020  4:10PM    EXAM:  XR UGI SNGL CON STDY                        PROCEDURE DATE:  10/19/2020      IMPRESSION: decreased motility of the thoracic esophagus. There was passage of barium into the stomach. The patient then proceeded to vomit the barium could not proceed with the examination..    GRISELDA MURDOCK M.D., ATTENDING RADIOLOGIST  This document has been electronically signed. Oct 19 2020  4:42PM    EXAM:  US ABDOMEN LIMITED                        PROCEDURE DATE:  10/18/2020      IMPRESSION:    Moderate ascites.    LOVELY BLEVINS M.D., ATTENDING RADIOLOGIST  This document has been electronically signed. Oct 18 2020  4:54PM        EXAM:  CTA CHEST PE PROTOCOL (W)AW IC                        PROCEDURE DATE:  10/17/2020      IMPRESSION:    No pulmonary embolus.    Patchy peribronchial vascular consolidations ofthe lung bases. The concurrent finding of a dilated and fluid-filled esophagus suggests that aspiration may be a likely etiology for this finding.      ALICIA WAN M.D., ATTENDING RADIOLOGIST  This document has been electronically signed. Oct 17 2020  2:54PM      EXAM:  CT ABDOMEN AND PELVIS OC IC                        PROCEDURE DATE:  10/15/2020      IMPRESSION:  No bowel obstruction.  Moderate ascites, minimally increased.    RACHEL SARAVIA M.D., ATTENDING RADIOLOGIST  This document has been electronically signed. Oct 15 2020  8:53PM      PATIENTNAME Patient Name: Nathalia Escobar    EXAMDATE Procedure Date: 10/21/2020 7:13 AM    ENDOPROCEDURENAME Procedure:           Upper GI endoscopy    IMPRESS Impression:          - Fluid in the esophagus.    IMPRESS                      - Small hiatal hernia.    IMPRESS                      - Normal examined duodenum.    IMPRESS                      - Dilation in the entire esophagus.    IMPRESS                      - No specimens collected.    ENDORECOMMENDATION Recommendation:      - The signs and symptoms of potential delayed     ENDORECOMMENDATION                      complications were discussed with the patient.    ENDORECOMMENDATION                      - Patient has a contact number available for emergencies.    ENDORECOMMENDATION          - Return to normal activities tomorrow.    ENDORECOMMENDATION                      - Resume previous diet.    ENDORECOMMENDATION                      - Continue present medications.    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 Escobar is a 65y old Female coming from home with hx of  ovarian CA (Dx January 2020, w/ recurrent omental mets, peritoneal ca), 3rd admission this year, last in February for bowel obstruction. Pt now admitted on 10/12 to start new inpt chemo as sent in by her oncologist, Dr Fish, also complaining of inc abd pain and LE swelling. Of note, She was recently at [facility] and had paracentesis w/ 2.4L drained. Found to have GI dysmotility, likely effected by mets, also with ascites, and issues with swallowing here. Course further c/b PNA. Palliative Care consulted to assist with establishing GOC and for help with sx management.     Met Mrs. Escobar this afternoon. Explained role of our team in her care and she was open to conversing. She endorsed 2/10 lower abdominal pain, achy, usually between 2-4/10 in intensity, which is acceptable for her. She says IV Tylenol resolves pain but only lasts about 4-5 hrs. Explained the dosing for IV Tylenol and safety. Also inquired if pt had ever had morphine before, which she endorsed, saying it made her sleepy, though unsure of dose at that time. Nonetheless, she was open to small dose being ordered for breakthrough if her pain increased between doses of IV Tylenol. She also endorsed ROSAS, denying this at rest. She noted nausea, ongoing since admission, though improved with ATC Reglan and octreotide- also noting IV ativan was helpful for this when given. She denied vomiting, but says nausea is always present, exacerbated by trying to eat anything more substantial than ice chips- which she just became able to tolerate. She denied constipation, though noting BM today that had black color to it, denying discomfort with this movement.     Mrs. Escobar was very forthcoming with her understanding of her disease, knowing more treatment for her cancer was not in her best interest. Likewise she affirmed talking with Dr. Altamirano today, agreeing with him that a PEG, given her intraabdominal issues would be greater risk than benefit. She also noted recommendations made for comfort focus and hospice, though she did want more clarification on this. Offered GOC conversation to her, knowing her  would be in soon after initial encounter started. She was open to this and thus, we returned later for said conversation. Please see below embedded GOC note for additional details and decisions.     PAIN: ( x)Yes   ( )No  Level: mild  Location: lower abdomen  Intensity:   2 /10  Quality: ache  Aggravating Factors: moving bowels, position  Alleviating Factors: pain meds  Radiation: no  Duration/Timing: intermittent  Impact on ADLs: yes    DYSPNEA: (x ) Yes  ( ) No  Level: on exertion    PAST MEDICAL & SURGICAL HISTORY:  Ovarian cancer    Constipation    Peritoneal carcinoma    Omental metastasis    No significant past surgical history        SOCIAL HX:    Hx opiate tolerance ( )YES  ( )NO    Baseline ADLs  (Prior to Admission)  ( ) Independent   ( )Dependent    FAMILY HISTORY:  FH: heart attack        Review of Systems:    Anxiety- denies  Depression- yes, but denies SI or HI  Constipation- no, BM today, but black  Diarrhea- no  Nausea- yes  Vomiting- yes  Anorexia- yes  Weight Loss- yes   Cough- no  Secretions- yes  Fatigue- yes  Weakness- yes  Delirium- no    All other systems reviewed and negative      PHYSICAL EXAM:    Vital Signs Last 24 Hrs  T(C): 36.7 (22 Oct 2020 08:34), Max: 36.7 (22 Oct 2020 08:34)  T(F): 98.1 (22 Oct 2020 08:34), Max: 98.1 (22 Oct 2020 08:34)  HR: 71 (22 Oct 2020 08:34) (71 - 90)  BP: 137/78 (22 Oct 2020 08:34) (129/68 - 137/78)  BP(mean): --  RR: 18 (22 Oct 2020 08:34) (16 - 18)  SpO2: 97% (22 Oct 2020 08:34) (94% - 97%)  Daily     Daily     PPSV2: 30-40  %    General: Middle aged female, sitting up in bed, pleasant, NAD  Mental Status: AOx4  HEENT: mmm, + temporal wasting  Lungs: dec at bases bl  Cardiac: + s1 s2 rrr  GI: soft, mod distention, ttp lower quadrant, + bs  : voids  MSK/skin: moves all extremities, some bl le edema, muscle/fat wasting  Neuro: no focal deficite      LABS:                        9.1    5.93  )-----------( 62       ( 22 Oct 2020 06:54 )             28.5     10-22    138  |  106  |  12  ----------------------------<  118<H>  3.8   |  25  |  0.78    Ca    8.8      22 Oct 2020 06:54    TPro  6.0  /  Alb  2.1<L>  /  TBili  0.7  /  DBili  x   /  AST  41<H>  /  ALT  20  /  AlkPhos  211<H>  10-22      Albumin: Albumin, Serum: 2.1 g/dL (10-22 @ 06:54)      Allergies    codeine (Unknown)  mineral oil (Rash)  shellfish (Unknown)    Intolerances      MEDICATIONS  (STANDING):  bisacodyl 5 milliGRAM(s) Oral at bedtime  ergocalciferol 66586 Unit(s) Oral <User Schedule>  famotidine Injectable 20 milliGRAM(s) IV Push at bedtime  fentaNYL   Patch  12 MICROgram(s)/Hr 1 Patch Transdermal every 72 hours  FIRST- Mouthwash  BLM 10 milliLiter(s) Swish and Spit two times a day  guaiFENesin   Syrup  (Sugar-Free) 200 milliGRAM(s) Oral every 6 hours  influenza   Vaccine 0.5 milliLiter(s) IntraMuscular once  melatonin 3 milliGRAM(s) Oral at bedtime  metoclopramide Injectable 10 milliGRAM(s) IV Push three times a day  mirtazapine 7.5 milliGRAM(s) Oral at bedtime  multivitamin/minerals 1 Tablet(s) Oral daily  octreotide  Injectable 100 MICROGram(s) SubCutaneous every 8 hours  pantoprazole  Injectable 40 milliGRAM(s) IV Push daily  piperacillin/tazobactam IVPB.. 3.375 Gram(s) IV Intermittent every 8 hours    MEDICATIONS  (PRN):  acetaminophen   Tablet .. 650 milliGRAM(s) Oral every 4 hours PRN Mild Pain (1 - 3)  aluminum hydroxide/magnesium hydroxide/simethicone Suspension 30 milliLiter(s) Oral every 4 hours PRN Dyspepsia  LORazepam   Injectable 0.5 milliGRAM(s) IV Push every 6 hours PRN for nausea if reglan not effective  polyethylene glycol 3350 17 Gram(s) Oral daily PRN if no BM for 2 days  simethicone 80 milliGRAM(s) Chew three times a day PRN Indigestion      RADIOLOGY/ADDITIONAL STUDIES:    EXAM:  MR BRAIN [facility]                        PROCEDURE DATE:  10/20/2020      IMPRESSION:    Focus of mild enhancement in the left luis most compatible with subacute infarct in the setting of multiple chronic brainstem lacunar infarcts and microvascular ischemic changes. Nonetheless, given cancer history, a follow-up in 4-6 weeks is recommended to ensure resolution.    MAGUI MCNULTY   This document hasbeen electronically signed. Oct 20 2020  4:10PM    EXAM:  XR UGI SNGL CON STDY                        PROCEDURE DATE:  10/19/2020      IMPRESSION: decreased motility of the thoracic esophagus. There was passage of barium into the stomach. The patient then proceeded to vomit the barium could not proceed with the examination..    GRISELDA MURDOCK M.D., ATTENDING RADIOLOGIST  This document has been electronically signed. Oct 19 2020  4:42PM    EXAM:  US ABDOMEN LIMITED                        PROCEDURE DATE:  10/18/2020      IMPRESSION:    Moderate ascites.    LOVELY BLEVINS M.D., ATTENDING RADIOLOGIST  This document has been electronically signed. Oct 18 2020  4:54PM        EXAM:  CTA CHEST PE PROTOCOL (W)AW IC                        PROCEDURE DATE:  10/17/2020      IMPRESSION:    No pulmonary embolus.    Patchy peribronchial vascular consolidations ofthe lung bases. The concurrent finding of a dilated and fluid-filled esophagus suggests that aspiration may be a likely etiology for this finding.    ALICIA WAN M.D., ATTENDING RADIOLOGIST  This document has been electronically signed. Oct 17 2020  2:54PM      EXAM:  CT ABDOMEN AND PELVIS OC IC                        PROCEDURE DATE:  10/15/2020      IMPRESSION:  No bowel obstruction.  Moderate ascites, minimally increased.    RACHEL SARAVIA M.D., ATTENDING RADIOLOGIST  This document has been electronically signed. Oct 15 2020  8:53PM      EXAMDATE Procedure Date: 10/21/2020 7:13 AM  ENDOPROCEDURENAME Procedure:           Upper GI endoscopy    IMPRESS Impression:          - Fluid in the esophagus.  IMPRESS                      - Small hiatal hernia.  IMPRESS                      - Normal examined duodenum.  IMPRESS                      - Dilation in the entire esophagus.  IMPRESS                      - No specimens collected.  ENDORECOMMENDATION Recommendation:      - The signs and symptoms of potential delayed   ENDORECOMMENDATION                      complications were discussed with the patient.  ENDORECOMMENDATION                      - Patient has a contact number available for emergencies.  ENDORECOMMENDATION          - Return to normal activities tomorrow.  ENDORECOMMENDATION                      - Resume previous diet.  ENDORECOMMENDATION                      - Continue present medications.  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

## 2020-10-22 NOTE — PROGRESS NOTE ADULT - ASSESSMENT
65F w/PMH ovarian CA, omental mets, peritoneal ca, presents to ED on 10/12/20 to start new inpt chemo as sent in by her oncologist, Dr Fish.   recently, paracentesis w/ 2.4L drained.  ++ diffuse abd pain that is constant, + b/l LE swelling which she's never had before.    +thrush  Nausea, vomiting, GERD suspected esophageal dysmotility due to  progression of carcinomatosis of ovarian ca, small bowel involvement    ( CT soft tissue density next to SBO and cecum)   antiemetics - stop zofran - pt feels worse after zofran , more mucus, c/w lower dose of ativan 0.5 q6h prn and reglan around the clock   cont IV pepcid and PPI   MR brain - no metastasis, cerebrovascular disease with lacunar infarctions  GI consult Dr. 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   10/18 - us abd - moderate ascites      h/o PE on lovenox , 10/17 - Hypoxic Resp Failure suspected aspirational  PNA  ruled out PE   10/22 - episode of black stool   CTA - neg for PE , c/w  lovenox 90--> 40 ( episode of melena)   , CT negative for PE   called and discussed findings with pt - will start GN and anerobic coverage, c/w IV zosyn   add robitusin , CXR in am, incentive spirometry  Left Sari subacute infarction with chronic cerebrovascular disease  - start ASA 81 and statins, LDL 75  Vitamin D deficiency - high dose replacement  Severe protein-calorie malnutrition- supplements , glucerna, can not tolerate ensure, gelatin   add remeron 7.5 mg hs, 10/21/20    Advanced directives   - MOLST form completed DNR/DNI, trial of feeding tube 17 min spend  -poor prognosis - d/w Dr. Fish   - plan for inpatient hospice, VNS, SW on the case  - palliative consult    discussed with GI and HEME ONC Dr Abe Garcia's office in  - [facility] -       updated 10/20, 10/21, 10/22

## 2020-10-23 NOTE — DISCHARGE NOTE PROVIDER - NSDCMRMEDTOKEN_GEN_ALL_CORE_FT
acetaminophen 325 mg oral tablet: 2 tab(s) orally every 6 hours, As needed, Moderate Pain (4 - 6)  bisacodyl 5 mg oral delayed release tablet: 1 tab(s) orally once a day (at bedtime)  diphenhydramine/lidocaine/aluminum hydroxide/magnesium hydroxide/simethicone mucous membrane suspension: 10 milliliter(s) mucous membrane 2 times a day swish and spit  enoxaparin: 40 milligram(s) subcutaneous once a day   if rectal bleeding reccur stop  ergocalciferol 50,000 intl units (1.25 mg) oral capsule: 1 cap(s) orally once a week  famotidine 10 mg/mL intravenous solution: 2 milliliter(s) intravenous once a day (at bedtime)  fentaNYL 12 mcg/hr transdermal film, extended release: 1 patch transdermal every 72 hours  guaiFENesin 100 mg/5 mL oral liquid: 10 milliliter(s) orally every 6 hours  LORazepam: 0.5 milligram(s) intravenous every 6 hours, As Needed  melatonin 3 mg oral tablet: 1 tab(s) orally once a day (at bedtime)  metoclopramide 5 mg/mL injectable solution: 10 milligram(s) injectable 3 times a day  mirtazapine 7.5 mg oral tablet: 1 tab(s) orally once a day (at bedtime)  morphine: 1 milligram(s) intravenous every 4 hours, As Needed  Multiple Vitamins with Minerals oral tablet: 1 tab(s) orally once a day  octreotide 2500 mcg/mL subcutaneous solution: 0.04 milliliter(s) subcutaneous every 8 hours  pantoprazole 40 mg intravenous injection: 40 milligram(s) intravenous once a day  simethicone 80 mg oral tablet, chewable: 1 tab(s) orally 3 times a day, As needed, Indigestion

## 2020-10-23 NOTE — DISCHARGE NOTE PROVIDER - HOSPITAL COURSE
· Subjective and Objective:     cc -nausea, vomiting , abdominal pain       65 female  w/PMH ovarian CA, omental mets, peritoneal ca, presents to ED on 10/12/20 to start new inpt chemo as sent in by her oncologist, Dr Fish.  She was recently at [facility] and had paracentesis w/ 2.4L drained.  She endorses diffuse abd pain that is constant for which she has fentanyl patch and takes tylenol.  Pain was 3/10 and currently 1/10 after taking tylenol.  She also endorses recent b/l LE swelling which she's never had before.  She is on lovenox 60mg Qdaily.  She denies any fever/chills, cp, sob, n/v/d, dysuria.  In ED, received 1L ivf.      SUBJECTIVE / OVERNIGHT EVENTS:  10/13: s/e at bedside, doing well, no new c/o or o/n events.  pain controlled.   10/14 - no cp palps sob or abdo pain; last BM yesterday - passing gas   10/15 - no cp palps sob; one episode of vomiting this morning; had lunch   10/16 - persistent nausea, vomited again this morning   10/17 - no cp palps sob; some cough, hypoxia, got CT chest   10/18 - no cp palps sob; nausea, lot of regurg  10/19 - pt seen and examined, + nausea, vomiting, poor po intake  10/20 - pt seen and examined, + nausea, + vomiting, + epigastric pain, afebrile, POC discussed   10/21 - pt seen and examined, + secretions, + cough, + nausea, denies cp, + some abdominal discomfort, POC discussed   10/22 pt seen and examined, feels better, less secretions, denies cp, + abdominal discomfort    10/23 - pt seen and examined, feels better, better sleep , denies cp, abdominal pain well controlled    Review of system- Rest of the review of system are negative except mentioned in HPI    T(C): 36.8 (10-23-20 @ 07:52), Max: 36.8 (10-23-20 @ 07:52)  T(F): 98.3 (10-23-20 @ 07:52), Max: 98.3 (10-23-20 @ 07:52)  HR: 81 (10-23-20 @ 07:52) (76 - 81)  BP: 135/83 (10-23-20 @ 07:52) (129/79 - 143/84)  RR: 16 (10-23-20 @ 07:52) (16 - 16)  SpO2: 96% (10-23-20 @ 07:52) (95% - 97%)  Wt(kg): --  GENERAL: NAD, well-developed  HEAD:  Atraumatic, Normocephalic  EYES: EOMI, PERRLA, conjunctiva and sclera clear  ENT: normal hearing, no nasal discharge, throat clear, dentition normal, +THRUSH  NECK: Supple, No JVD, no LAD, no thyromegaly   CHEST/LUNG: Clear to auscultation bilaterally; No wheeze, respirations unlabored  HEART: Regular rate and rhythm; No murmurs, rubs, or gallops  ABDOMEN: Soft, +DIFFUSE TTP, MILDLY distended; Bowel sounds present,  EXTREMITIES:  2+ Peripheral Pulses, No clubbing, cyanosis, ++ LE nonpitting b/l edema  NEUROLOGY: non-focal, sensory and cn 2-12 intact, speech/language intact    65F w/PMH ovarian CA, omental mets, peritoneal ca, presents to ED on 10/12/20 to start new inpt chemo as sent in by her oncologist, Dr Fish.   recently, paracentesis w/ 2.4L drained.  ++ diffuse abd pain that is constant, + b/l LE swelling which she's never had before.    +thrush  Nausea, vomiting, GERD suspected esophageal dysmotility due to  progression of carcinomatosis of ovarian ca, small bowel involvement    ( CT soft tissue density next to SBO and cecum)   antiemetics - stop zofran - pt feels worse after zofran , more mucus, c/w lower dose of ativan 0.5 q6h prn and reglan around the clock   cont IV pepcid and PPI   MR brain - no metastasis, cerebrovascular disease with lacunar infarctions  GI consult Dr. 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   10/18 - us abd - moderate ascites      h/o PE on lovenox , 10/17 - Hypoxic Resp Failure suspected aspirational  PNA  ruled out PE   10/22 - episode of black stool   CTA - neg for PE , c/w  lovenox 90--> 40 ( episode of melena)   , CT negative for PE   called and discussed findings with pt - will start GN and anerobic coverage, c/w IV zosyn   add robitusin , CXR in am, incentive spirometry  Left Sari subacute infarction with chronic cerebrovascular disease  - start ASA 81 and statins, LDL 75  Vitamin D deficiency - high dose replacement  Severe protein-calorie malnutrition- supplements , glucerna, can not tolerate ensure, gelatin   add remeron 7.5 mg hs, 10/21/20  Advanced directives   - MOLST form completed DNR/DNI, trial of feeding tube 17 min spend  -poor prognosis - d/w Dr. Fish   - plan for inpatient hospice, VNS, SW on the case  - palliative consult

## 2020-10-23 NOTE — DISCHARGE NOTE PROVIDER - NSDCCPCAREPLAN_GEN_ALL_CORE_FT
PRINCIPAL DISCHARGE DIAGNOSIS  Diagnosis: Ovarian cancer  Assessment and Plan of Treatment: comfort measures as per hospice

## 2020-10-23 NOTE — DISCHARGE NOTE PROVIDER - CARE PROVIDER_API CALL
Jesus Fish  [address], [address] [place] [address]  Phone: [phone]  Fax: [phone]  Follow Up Time:

## 2020-11-05 ENCOUNTER — APPOINTMENT (OUTPATIENT)
Dept: GASTROENTEROLOGY | Facility: CLINIC | Age: 65
End: 2020-11-05

## 2021-04-12 NOTE — PHYSICAL THERAPY INITIAL EVALUATION ADULT - STANDING BALANCE: STATIC
Anesthesia Evaluation     Patient summary reviewed   NPO Solid Status: > 8 hours             Airway   Mallampati: II  TM distance: >3 FB  Neck ROM: full  Dental    (+) upper dentures    Pulmonary    (-) COPD, asthma, sleep apnea, not a smoker  Cardiovascular   Exercise tolerance: excellent (>7 METS)    (-) pacemaker, past MI, angina, cardiac stents      Neuro/Psych  (-) seizures, TIA, CVA  GI/Hepatic/Renal/Endo    (+) morbid obesity,    (-) GERD, liver disease, no renal disease, diabetes    Musculoskeletal     Abdominal    Substance History      OB/GYN    (-)  Pregnant        Other                        Anesthesia Plan    ASA 3     general     intravenous induction     Anesthetic plan, all risks, benefits, and alternatives have been provided, discussed and informed consent has been obtained with: patient.      
good minus

## 2021-05-22 NOTE — ED STATDOCS - RESPIRATORY, MLM
Staples to left knee still in place. Sutures to attempted removal by pt 2 weeks ago, still about 4 in place. No redness or swelling around sutures.    breath sounds clear and equal bilaterally.

## 2021-08-11 NOTE — PATIENT PROFILE ADULT. - ARE SIGNIFICANT INDICATORS COMPLETE.
I will send over Rx for nausea/vomiting to help her keep the meds and food down.  Take with food or probiotics.  Recheck in clinic if symptoms worsen or if symptoms do not improve.      Misti Almodovar PA-C   Yes

## 2021-10-04 NOTE — BRIEF OPERATIVE NOTE - NSICDXBRIEFOPLAUNCH_GEN_ALL_CORE
<--- Click to Launch ICDx for PreOp, PostOp and Procedure PRINCIPAL PROCEDURE  Procedure: Anterior cervical discectomy and fusion (ACDF) at single level  Findings and Treatment:        PRINCIPAL PROCEDURE  Procedure: Anterior cervical discectomy and fusion (ACDF) at single level  Findings and Treatment: cervical spinal cord compression

## 2021-10-15 NOTE — PATIENT PROFILE ADULT. - NS TRANSFER EYEGLASSES PAIRS
CM spoke with pt on phone to discuss SNF vs home W/ [facility] as pt wanted earlier this week. Pt states that he is now willing to go to SNF for rehab as no one is at home with him. CM called [facility] vas[facility] [place] to check referral status; no answer. Mark carrasco as well to f/u. Awaiting return call from facility. Will follow.      Makeda Feng RN 1 pair

## 2021-11-10 NOTE — PATIENT PROFILE ADULT - NSASFALLATTEMPTOOB_GEN_A_NUR
I reviewed the H&P, I examined the patient, and there are no changes in the patient's condition.    Alen Headley, PGY1  Podiatric Medicine and Surgery   
no

## 2022-05-09 NOTE — DISCHARGE NOTE NURSING/CASE MANAGEMENT/SOCIAL WORK - NSTRANSFEREYEGLASSESPAIRS_GEN_A_NUR
[FreeTextEntry8] : Pt consents to this video consultation.\par Pt is feeling well.\par Pt requests renewals of adderal and clonazepam 1 pair

## 2022-07-14 NOTE — ED PROVIDER NOTE - EYES NEGATIVE STATEMENT, MLM
2000 labs drawn per patient and sitter, labs resulted under previous lab time and order.  Spoke to phlebotomy, confirmed lab was collected at 1931 and sent to lab.  Spoke to lab supervisor, stated specimen was received at 1940 but drawn 1607 so it results under 1607 time. RN ask supervisor to pull tubes and figure out where the mistake was made so it can be corrected.   Supervisor states she will call phlebotomy and look into the situation call me back.  Lab supervisor called back and recommended placing a MIDAS so there can be an investigation because the phlebotomist and their supervisor are gone for the day. MIDAS placed for event.     2210 Dr Steiner notified of miss-timed labs.  **overnight call Dr Steiner is sodium is over 135 only    0030 Dr. Steiner notified of sodium 138  Started D5W @50/ hr    0400 BMP not completed, CBC was drawn at 0411; inquiry sent to lead phlebotomist, phlebotomist on floor now and drawing patient 0619.  Per lead phlebotomist, specimen hemolyzed and had to be re-collected per lab. Lab did not notify RN or order stat re-draw, which would have been most appropriate. RN placed MIDAS for this lack of communication and potential delay in care.     INPUT AND OUTPUT RECORDING HAS BEEN STRICT SINCE ADMISSION TO U5   no discharge, no irritation, no pain, no redness, and no visual changes.

## 2023-01-01 NOTE — ED STATDOCS - NSFOLLOWUPINSTRUCTIONS_ED_ALL_ED_FT
(2) more than 100 beats/min Acute Abdominal Pain    WHAT YOU NEED TO KNOW:    The cause of your abdominal pain may not be found. If a cause is found, treatment will depend on what the cause is.     DISCHARGE INSTRUCTIONS:    Return to the emergency department if:     You vomit blood or cannot stop vomiting.      You have blood in your bowel movement or it looks like tar.       You have bleeding from your rectum.       Your abdomen is larger than usual, more painful, and hard.       You have severe pain in your abdomen.       You stop passing gas and having bowel movements.       You feel weak, dizzy, or faint.    Contact your healthcare provider if:     You have a fever.      You have new signs and symptoms.      Your symptoms do not get better with treatment.       You have questions or concerns about your condition or care.    Medicines may be given to decrease pain, treat an infection, and manage your symptoms. Take your medicine as directed. Call your healthcare provider if you think your medicine is not helping or if you have side effects. Tell him if you are allergic to any medicine. Keep a list of the medicines, vitamins, and herbs you take. Include the amounts, and when and why you take them. Bring the list or the pill bottles to follow-up visits. Carry your medicine list with you in case of an emergency.    Manage your symptoms:     Apply heat on your abdomen for 20 to 30 minutes every 2 hours for as many days as directed. Heat helps decrease pain and muscle spasms.       Manage your stress. Stress may cause abdominal pain. Your healthcare provider may recommend relaxation techniques and deep breathing exercises to help decrease your stress. Your healthcare provider may recommend you talk to someone about your stress or anxiety, such as a counselor or a trusted friend. Get plenty of sleep and exercise regularly.       Limit or do not drink alcohol. Alcohol can make your abdominal pain worse. Ask your healthcare provider if it is safe for you to drink alcohol. Also ask how much is safe for you to drink.       Do not smoke. Nicotine and other chemicals in cigarettes can damage your esophagus and stomach. Ask your healthcare provider for information if you currently smoke and need help to quit. E-cigarettes or smokeless tobacco still contain nicotine. Talk to your healthcare provider before you use these products.     Make changes to the food you eat as directed: Do not eat foods that cause abdominal pain or other symptoms. Eat small meals more often.     Eat more high-fiber foods if you are constipated. High-fiber foods include fruits, vegetables, whole-grain foods, and legumes.       Do not eat foods that cause gas if you have bloating. Examples include broccoli, cabbage, and cauliflower. Do not drink soda or carbonated drinks, because these may also cause gas.       Do not eat foods or drinks that contain sorbitol or fructose if you have diarrhea and bloating. Some examples are fruit juices, candy, jelly, and sugar-free gum.       Do not eat high-fat foods, such as fried foods, cheeseburgers, hot dogs, and desserts.      Limit or do not drink caffeine. Caffeine may make symptoms, such as heart burn or nausea, worse.       Drink plenty of liquids to prevent dehydration from diarrhea or vomiting. Ask your healthcare provider how much liquid to drink each day and which liquids are best for you.     Follow up with your healthcare provider as directed: Write down your questions so you remember to ask them during your visits.    Constipation    WHAT YOU NEED TO KNOW:    Constipation is when you have hard, dry bowel movements, or you go longer than usual between bowel movements.     DISCHARGE INSTRUCTIONS:    Return to the emergency department if:     You have blood in your bowel movements.      You have a fever and abdominal pain with the constipation.    Contact your healthcare provider if:     Your constipation gets worse.       You start to vomit.      You have questions or concerns about your condition or care.    Medicines:     Medicine such as a laxative may help relax and loosen your intestines to help you have a bowel movement. Your provider may recommend you only use laxatives for a short time. Long-term use may make your bowels dependent on the medicine.      Take your medicine as directed. Contact your healthcare provider if you think your medicine is not helping or if you have side effects. Tell him of her if you are allergic to any medicine. Keep a list of the medicines, vitamins, and herbs you take. Include the amounts, and when and why you take them. Bring the list or the pill bottles to follow-up visits. Carry your medicine list with you in case of an emergency.    Relieve constipation:     A suppository may be used to help soften your bowel movements. This may make them easier to pass. A suppository is guided into your rectum through your anus.Suppository for Constipation           An enema is liquid medicine used to clear bowel movement from your rectum. The medicine is put into your rectum through your anus.Enemas         Prevent constipation:     Drink liquids as directed. You may need to drink extra liquids to help soften and move your bowels. Ask how much liquid to drink each day and which liquids are best for you.       Eat high-fiber foods. This may help decrease constipation by adding bulk to your bowel movements. High-fiber foods include fruit, vegetables, whole-grain breads and cereals, and beans. Your healthcare provider or dietitian can help you create a high-fiber meal plan. Your provider may also recommend a fiber supplement if you cannot get enough fiber from food.            Exercise regularly. Regular physical activity can help stimulate your intestines. Ask which exercises are best for you.      Schedule a time each day to have a bowel movement. This may help train your body to have regular bowel movements. Bend forward while you are on the toilet to help move the bowel movement out. Sit on the toilet for at least 10 minutes, even if you do not have a bowel movement.     Follow up with your healthcare provider as directed: Write down your questions so you remember to ask them during your visits.

## 2023-01-27 NOTE — PROGRESS NOTE ADULT - ASSESSMENT
Final Anesthesia Post-op Assessment    Patient: Rashaad Hugo  Procedure(s) Performed: COLONOSCOPY  Anesthesia type: MAC    Vitals Value Taken Time   Temp 36.2 °C (97.1 °F) 01/27/23 1355   Pulse 55 01/27/23 1400   Resp 16 01/27/23 1400   SpO2 99 % 01/27/23 1400   /56 01/27/23 1400         Patient Location: Phase II  Post-op Vital Signs:stable  Level of Consciousness: participates in exam, awake, alert and oriented  Respiratory Status: spontaneous ventilation and room air  Cardiovascular stable  Hydration: euvolemic  Pain Management: well controlled  Handoff: Handoff to receiving clinician was performed and questions were answered  Vomiting: none  Nausea: None  Airway Patency:patent  Post-op Assessment: awake, alert, appropriately conversant, or baseline, no complications and patient tolerated procedure well with no complications  Comments: Pt has been reassessed and is deemed ready for discharge to home in good condition.      No notable events documented.    Stage III C Ovarian cancer  R pelvic mass/ omental metastases, small bowel obstruction  S/P  Taxol Carb x 1    Malnourishment    SBO  symptoms seem a little worse off NGT    shall repeat AXR    rec  FU surgery and GI   may need to repeat Ct if symptoms not improved   the patient is not optimized for discharge yet, high likelihood of readmission if DCed with current symptoms.

## 2023-08-15 NOTE — ED ADULT NURSE NOTE - NS ED NURSE LEVEL OF CONSCIOUSNESS ORIENTATION
Oriented - self; Oriented - place; Oriented - time Price (Use Numbers Only, No Special Characters Or $): 428 Price (Use Numbers Only, No Special Characters Or $): 198

## 2024-03-06 NOTE — ED ADULT TRIAGE NOTE - PAIN RATING/NUMBER SCALE (0-10): ACTIVITY
Focus note:    Pt transferring out of ICU today, verbal sign out given by ICU staff, per sign out: pt with meningioma, s/p resection yesterday, neurosurgery primary, ICU/hospitalist consulted for medical management    - hospitalist team taking over patient's care.     Francisco Wolf MD  Hospitalist  3/6/2024 11:31 AM          
0

## 2024-07-07 NOTE — CHART NOTE - NSCHARTNOTEFT_GEN_A_CORE
Assessment:   65F w/PMH ovarian CA, omental mets, peritoneal ca, presents to ED on 10/12/20 to start new inpt chemo as sent in by her oncologist, Dr Fish.   recently, paracentesis w/ 2.4L drained.  ++ diffuse abd pain that is constant, + b/l LE swelling which she's never had before.    +thrush    Recurrent Ovarian  cancer    Malignant ascites Generalized abdominal pain  -monitor, may need paracentesis.   10/18 - us abd - moderate ascites   Nausea, vomiting, GERD suspected esophageal dysmotility vs progression of carcinomatosis, vs SBO ( CT density next to SBO and cecum)   AXR - no obstruction   10/15 - for nausea/vomiting - use ZOFRAN/ATIVAN, cont IV PEPCID   10/16 - increase pepcid bid, cont zofran, ativan, add reglan atc   Oral thrush -  on nystatin S&S  H/O PE  10/17 - Hypoxic Resp Failure suspected aspirational  PNA  ruled out PE         Diet Prescription: Diet, Mechanical Soft:   Supplement Feeding Modality:  Oral  Glucerna Shake Cans or Servings Per Day:  3       Frequency:  Three Times a day (10-20-20 @ 17:06)  Diet, Mechanical Soft:   Supplement Feeding Modality:  Oral  Glucerna Shake Cans or Servings Per Day:  3       Frequency:  Three Times a day (10-20-20 @ 09:23)    Frantz 21  Edema 1+ left and right ankle  Wt 67.4 kg    10/13      Pertinent Labs: 10-22 Na138 mmol/L Glu 118 mg/dL<H> K+ 3.8 mmol/L Cr  0.78 mg/dL BUN 12 mg/dL 10-18 Phos 3.2 mg/dL 10-22 Alb 2.1 g/dL<L> 10-20 PAB 7 mg/dL<L> 10-21 Chol 153 mg/dL LDL 75 mg/dL HDL 58 mg/dL Trig 98 mg/dL    Wt Hx:  Height (cm): 177.8 (10-13-20 @ 00:29)  Weight (kg): 67.4 (10-13-20 @ 09:26)  BMI (kg/m2): 21.3 (10-13-20 @ 09:26)    Pertinent Medications: MEDICATIONS  (STANDING):  bisacodyl 5 milliGRAM(s) Oral at bedtime  enoxaparin Injectable 40 milliGRAM(s) SubCutaneous daily  ergocalciferol 74213 Unit(s) Oral <User Schedule>  famotidine Injectable 20 milliGRAM(s) IV Push at bedtime  fentaNYL   Patch  12 MICROgram(s)/Hr 1 Patch Transdermal every 72 hours  FIRST- Mouthwash  BLM 10 milliLiter(s) Swish and Spit two times a day  guaiFENesin   Syrup  (Sugar-Free) 200 milliGRAM(s) Oral every 6 hours  influenza   Vaccine 0.5 milliLiter(s) IntraMuscular once  melatonin 3 milliGRAM(s) Oral at bedtime  metoclopramide Injectable 10 milliGRAM(s) IV Push three times a day  mirtazapine 7.5 milliGRAM(s) Oral at bedtime  multivitamin/minerals 1 Tablet(s) Oral daily  octreotide  Injectable 100 MICROGram(s) SubCutaneous every 8 hours  pantoprazole  Injectable 40 milliGRAM(s) IV Push daily  piperacillin/tazobactam IVPB.. 3.375 Gram(s) IV Intermittent every 8 hours    · Weight Used for Calculation  current     Estimated Energy Needs (25-30 calories/kg):  · Weight  (lbs)  149.9 lb  · Weight (kg)  68 kg  · From (25cal/kg)  1700   · To (30cal/kg)  2040     Other Calculation:  · Other Calculation  Ht.  70    "        Wt.       68 kg               BMI  21.6                IBW    68   kg               Pt is at 100    %  IBW    Estimated Protein Needs (1.4-1.6 g/kg):  · Weight  (lbs)  149.9   · Weight (kg)  68 kg  · From (1.4 g/kg)  95.2   · To (1.6 g/kg)  108.8     Estimated Fluid Needs (25-30 ml/kg):  · Weight  (lbs)  149.9   · Weight (kg)  68   · From (25 ml/kg)  1700   · To (30 ml/kg)  2040     Nutrient Intake:   Energy Intake:  · Energy Intake  fair PTA  · Energy Intake  Poor (<50%)       MEDICATIONS  (PRN):  acetaminophen   Tablet .. 650 milliGRAM(s) Oral every 4 hours PRN Mild Pain (1 - 3)  aluminum hydroxide/magnesium hydroxide/simethicone Suspension 30 milliLiter(s) Oral every 4 hours PRN Dyspepsia  LORazepam   Injectable 0.5 milliGRAM(s) IV Push every 6 hours PRN for nausea if reglan not effective  morphine  - Injectable 1 milliGRAM(s) IV Push every 4 hours PRN breakthrough pain or dyspnea  polyethylene glycol 3350 17 Gram(s) Oral daily PRN if no BM for 2 days  simethicone 80 milliGRAM(s) Chew three times a day PRN Indigestion        Recommendations:      Monitoring and Evaluation:   [x] PO intake/Nutr support infusion [ x ] Tolerance to Nutr [ x ] weights [ x ] labs[ x ] follow up per protocol  [ ] other: Assessment:   65F w/PMH ovarian CA, omental mets, peritoneal ca, presents to ED on 10/12/20 to start new inpt chemo as sent in by her oncologist, Dr Fish.   recently, paracentesis w/ 2.4L drained.  ++ diffuse abd pain that is constant, + b/l LE swelling which she's never had before.    +thrush    Recurrent Ovarian  cancer    Malignant ascites Generalized abdominal pain  -monitor, may need paracentesis.   10/18 - us abd - moderate ascites   Nausea, vomiting, GERD suspected esophageal dysmotility vs progression of carcinomatosis, vs SBO ( CT density next to SBO and cecum)   AXR - no obstruction   10/15 - for nausea/vomiting - use ZOFRAN/ATIVAN, cont IV PEPCID   10/16 - increase pepcid bid, cont zofran, ativan, add reglan atc   Oral thrush -  on nystatin S&S  H/O PE  10/17 - Hypoxic Resp Failure suspected aspirational  PNA  ruled out PE     Pt discharged to hospice        Diet Prescription: Diet, Mechanical Soft:   Supplement Feeding Modality:  Oral  Glucerna Shake Cans or Servings Per Day:  3       Frequency:  Three Times a day (10-20-20 @ 17:06)  Diet, Mechanical Soft:   Supplement Feeding Modality:  Oral  Glucerna Shake Cans or Servings Per Day:  3       Frequency:  Three Times a day (10-20-20 @ 09:23)    Frantz 21  Edema 1+ left and right ankle  Wt 67.4 kg    10/13      Pertinent Labs: 10-22 Na138 mmol/L Glu 118 mg/dL<H> K+ 3.8 mmol/L Cr  0.78 mg/dL BUN 12 mg/dL 10-18 Phos 3.2 mg/dL 10-22 Alb 2.1 g/dL<L> 10-20 PAB 7 mg/dL<L> 10-21 Chol 153 mg/dL LDL 75 mg/dL HDL 58 mg/dL Trig 98 mg/dL    Wt Hx:  Height (cm): 177.8 (10-13-20 @ 00:29)  Weight (kg): 67.4 (10-13-20 @ 09:26)  BMI (kg/m2): 21.3 (10-13-20 @ 09:26)    Pertinent Medications: MEDICATIONS  (STANDING):  bisacodyl 5 milliGRAM(s) Oral at bedtime  enoxaparin Injectable 40 milliGRAM(s) SubCutaneous daily  ergocalciferol 04626 Unit(s) Oral <User Schedule>  famotidine Injectable 20 milliGRAM(s) IV Push at bedtime  fentaNYL   Patch  12 MICROgram(s)/Hr 1 Patch Transdermal every 72 hours  FIRST- Mouthwash  BLM 10 milliLiter(s) Swish and Spit two times a day  guaiFENesin   Syrup  (Sugar-Free) 200 milliGRAM(s) Oral every 6 hours  influenza   Vaccine 0.5 milliLiter(s) IntraMuscular once  melatonin 3 milliGRAM(s) Oral at bedtime  metoclopramide Injectable 10 milliGRAM(s) IV Push three times a day  mirtazapine 7.5 milliGRAM(s) Oral at bedtime  multivitamin/minerals 1 Tablet(s) Oral daily  octreotide  Injectable 100 MICROGram(s) SubCutaneous every 8 hours  pantoprazole  Injectable 40 milliGRAM(s) IV Push daily  piperacillin/tazobactam IVPB.. 3.375 Gram(s) IV Intermittent every 8 hours    · Weight Used for Calculation  current     Estimated Energy Needs (25-30 calories/kg):  · Weight  (lbs)  149.9 lb  · Weight (kg)  68 kg  · From (25cal/kg)  1700   · To (30cal/kg)  2040     Other Calculation:  · Other Calculation  Ht.  70    "        Wt.       68 kg               BMI  21.6                IBW    68   kg               Pt is at 100    %  IBW    Estimated Protein Needs (1.4-1.6 g/kg):  · Weight  (lbs)  149.9   · Weight (kg)  68 kg  · From (1.4 g/kg)  95.2   · To (1.6 g/kg)  108.8     Estimated Fluid Needs (25-30 ml/kg):  · Weight  (lbs)  149.9   · Weight (kg)  68   · From (25 ml/kg)  1700   · To (30 ml/kg)  2040     Nutrient Intake:   Energy Intake:  · Energy Intake  fair PTA  · Energy Intake  Poor (<50%)       MEDICATIONS  (PRN):  acetaminophen   Tablet .. 650 milliGRAM(s) Oral every 4 hours PRN Mild Pain (1 - 3)  aluminum hydroxide/magnesium hydroxide/simethicone Suspension 30 milliLiter(s) Oral every 4 hours PRN Dyspepsia  LORazepam   Injectable 0.5 milliGRAM(s) IV Push every 6 hours PRN for nausea if reglan not effective  morphine  - Injectable 1 milliGRAM(s) IV Push every 4 hours PRN breakthrough pain or dyspnea  polyethylene glycol 3350 17 Gram(s) Oral daily PRN if no BM for 2 days  simethicone 80 milliGRAM(s) Chew three times a day PRN Indigestion        Recommendations:      Monitoring and Evaluation:   [x] PO intake/Nutr support infusion [ x ] Tolerance to Nutr [ x ] weights [ x ] labs[ x ] follow up per protocol  [ ] other: I have personally seen and examined the patient. I have collaborated with and supervised the

## 2025-03-06 NOTE — DISCHARGE NOTE PROVIDER - CARE PROVIDER_API CALL
Anthony Walsh)  Surgery; Surgical Oncology  [address] [place] [address]  Phone: [phone]  Fax: [phone]  Follow Up Time: 1 week    Jesus Fish)  Hematology; Internal Medicine; Medical Oncology  [address] [facility], [address] [place] [address]  Phone: [phone]  Fax: [phone]  Follow Up Time: 1 week    Primary medical doctor,   Phone: (   )    -  Fax: (   )    -  Follow Up Time: 1-3 days PROVIDER:[TOKEN:[[id number]],FOLLOWUP:[1 week]],PROVIDER:[TOKEN:[[id number]],FOLLOWUP:[1 week]]

## 2025-04-03 NOTE — DIETITIAN INITIAL EVALUATION ADULT. - PERTINENT MEDS FT
Sent 3/3/25 1 with 5 refills   
MEDICATIONS  (STANDING):  dexAMETHasone  IVPB 12 milliGRAM(s) IV Intermittent daily  fentaNYL   Patch  12 MICROgram(s)/Hr 1 Patch Transdermal every 72 hours  heparin  Injectable 5000 Unit(s) SubCutaneous every 8 hours  octreotide  Injectable 150 MICROGram(s) SubCutaneous three times a day  ondansetron Injectable 4 milliGRAM(s) IV Push every 6 hours  pantoprazole  Injectable 40 milliGRAM(s) IV Push daily  sodium chloride 0.9%. 1000 milliLiter(s) (120 mL/Hr) IV Continuous <Continuous>    MEDICATIONS  (PRN):  morphine  - Injectable 2 milliGRAM(s) IV Push every 4 hours PRN Moderate Pain (4 - 6)